# Patient Record
Sex: MALE | Race: WHITE | NOT HISPANIC OR LATINO
[De-identification: names, ages, dates, MRNs, and addresses within clinical notes are randomized per-mention and may not be internally consistent; named-entity substitution may affect disease eponyms.]

---

## 2017-01-17 ENCOUNTER — APPOINTMENT (OUTPATIENT)
Dept: INTERNAL MEDICINE | Facility: CLINIC | Age: 67
End: 2017-01-17

## 2017-01-17 VITALS
BODY MASS INDEX: 30.71 KG/M2 | SYSTOLIC BLOOD PRESSURE: 110 MMHG | DIASTOLIC BLOOD PRESSURE: 70 MMHG | WEIGHT: 202 LBS | TEMPERATURE: 97.4 F

## 2017-01-26 ENCOUNTER — MESSAGE (OUTPATIENT)
Age: 67
End: 2017-01-26

## 2017-01-26 ENCOUNTER — MEDICATION RENEWAL (OUTPATIENT)
Age: 67
End: 2017-01-26

## 2017-02-05 LAB — HBA1C MFR BLD HPLC: 6.1 %

## 2017-03-08 ENCOUNTER — RX RENEWAL (OUTPATIENT)
Age: 67
End: 2017-03-08

## 2017-03-09 ENCOUNTER — RX RENEWAL (OUTPATIENT)
Age: 67
End: 2017-03-09

## 2017-04-03 ENCOUNTER — OUTPATIENT (OUTPATIENT)
Dept: OUTPATIENT SERVICES | Facility: HOSPITAL | Age: 67
LOS: 1 days | Discharge: HOME | End: 2017-04-03

## 2017-04-03 ENCOUNTER — RESULT REVIEW (OUTPATIENT)
Age: 67
End: 2017-04-03

## 2017-04-03 ENCOUNTER — MEDICATION RENEWAL (OUTPATIENT)
Age: 67
End: 2017-04-03

## 2017-04-03 DIAGNOSIS — D47.3 ESSENTIAL (HEMORRHAGIC) THROMBOCYTHEMIA: ICD-10-CM

## 2017-04-04 ENCOUNTER — APPOINTMENT (OUTPATIENT)
Dept: HEMATOLOGY ONCOLOGY | Facility: CLINIC | Age: 67
End: 2017-04-04

## 2017-04-04 VITALS
BODY MASS INDEX: 31.02 KG/M2 | SYSTOLIC BLOOD PRESSURE: 110 MMHG | DIASTOLIC BLOOD PRESSURE: 68 MMHG | TEMPERATURE: 97 F | RESPIRATION RATE: 16 BRPM | WEIGHT: 203.99 LBS | HEART RATE: 68 BPM

## 2017-04-04 LAB
BASOPHILS # BLD AUTO: 0.1 K/UL — SIGNIFICANT CHANGE UP (ref 0–0.2)
BASOPHILS NFR BLD AUTO: 0.9 % — SIGNIFICANT CHANGE UP (ref 0–2)
EOSINOPHIL # BLD AUTO: 0.1 K/UL — SIGNIFICANT CHANGE UP (ref 0–0.5)
EOSINOPHIL NFR BLD AUTO: 2 % — SIGNIFICANT CHANGE UP (ref 0–6)
HCT VFR BLD CALC: 38.2 % — LOW (ref 39–50)
HGB BLD-MCNC: 13 G/DL — SIGNIFICANT CHANGE UP (ref 13–17)
LYMPHOCYTES # BLD AUTO: 1.1 K/UL — SIGNIFICANT CHANGE UP (ref 1–3.3)
LYMPHOCYTES # BLD AUTO: 17.7 % — SIGNIFICANT CHANGE UP (ref 13–44)
MCHC RBC-ENTMCNC: 31.6 PG — SIGNIFICANT CHANGE UP (ref 27–34)
MCHC RBC-ENTMCNC: 34.1 G/DL — SIGNIFICANT CHANGE UP (ref 32–36)
MCV RBC AUTO: 92.7 FL — SIGNIFICANT CHANGE UP (ref 80–100)
MONOCYTES # BLD AUTO: 0.7 K/UL — SIGNIFICANT CHANGE UP (ref 0–0.9)
MONOCYTES NFR BLD AUTO: 12.1 % — SIGNIFICANT CHANGE UP (ref 2–14)
NEUTROPHILS # BLD AUTO: 4.2 K/UL — SIGNIFICANT CHANGE UP (ref 1.8–7.4)
NEUTROPHILS NFR BLD AUTO: 67.3 % — SIGNIFICANT CHANGE UP (ref 43–77)
PLATELET # BLD AUTO: 362 K/UL — SIGNIFICANT CHANGE UP (ref 150–400)
RBC # BLD: 4.12 M/UL — LOW (ref 4.2–5.8)
RBC # FLD: 14 % — SIGNIFICANT CHANGE UP (ref 10.3–14.5)
WBC # BLD: 6.2 K/UL — SIGNIFICANT CHANGE UP (ref 3.8–10.5)
WBC # FLD AUTO: 6.2 K/UL — SIGNIFICANT CHANGE UP (ref 3.8–10.5)

## 2017-04-04 RX ORDER — FAMOTIDINE 40 MG/1
40 TABLET, FILM COATED ORAL
Qty: 90 | Refills: 3 | Status: DISCONTINUED | COMMUNITY
Start: 2017-01-17 | End: 2017-04-04

## 2017-05-16 ENCOUNTER — LABORATORY RESULT (OUTPATIENT)
Age: 67
End: 2017-05-16

## 2017-05-16 ENCOUNTER — APPOINTMENT (OUTPATIENT)
Dept: INTERNAL MEDICINE | Facility: CLINIC | Age: 67
End: 2017-05-16

## 2017-05-16 VITALS
WEIGHT: 202 LBS | DIASTOLIC BLOOD PRESSURE: 80 MMHG | SYSTOLIC BLOOD PRESSURE: 120 MMHG | BODY MASS INDEX: 30.71 KG/M2 | TEMPERATURE: 97.4 F

## 2017-05-16 VITALS — SYSTOLIC BLOOD PRESSURE: 112 MMHG | DIASTOLIC BLOOD PRESSURE: 74 MMHG

## 2017-05-16 DIAGNOSIS — Z11.59 ENCOUNTER FOR SCREENING FOR OTHER VIRAL DISEASES: ICD-10-CM

## 2017-05-16 RX ORDER — OMEPRAZOLE 20 MG
20 TABLET, DELAYED RELEASE (ENTERIC COATED) ORAL
Refills: 0 | Status: DISCONTINUED | COMMUNITY
Start: 2017-04-04 | End: 2017-05-16

## 2017-05-18 LAB
ALBUMIN SERPL ELPH-MCNC: 4.7 G/DL
ALP BLD-CCNC: 92 U/L
ALT SERPL-CCNC: 27 U/L
ANION GAP SERPL CALC-SCNC: 15 MMOL/L
AST SERPL-CCNC: 36 U/L
BASOPHILS # BLD AUTO: 0.02 K/UL
BASOPHILS NFR BLD AUTO: 0.4 %
BILIRUB SERPL-MCNC: 1.2 MG/DL
BUN SERPL-MCNC: 20 MG/DL
CALCIUM SERPL-MCNC: 9.9 MG/DL
CHLORIDE SERPL-SCNC: 103 MMOL/L
CHOLEST SERPL-MCNC: 79 MG/DL
CHOLEST/HDLC SERPL: 3.2 RATIO
CO2 SERPL-SCNC: 26 MMOL/L
CREAT SERPL-MCNC: 0.84 MG/DL
EOSINOPHIL # BLD AUTO: 0.05 K/UL
EOSINOPHIL NFR BLD AUTO: 0.9 %
GLUCOSE SERPL-MCNC: 107 MG/DL
HBA1C MFR BLD HPLC: 5.8 %
HCT VFR BLD CALC: 38.6 %
HCV AB SER QL: NONREACTIVE
HCV S/CO RATIO: 0.15 S/CO
HDLC SERPL-MCNC: 25 MG/DL
HGB BLD-MCNC: 12.3 G/DL
IMM GRANULOCYTES NFR BLD AUTO: 1.8 %
LDLC SERPL CALC-MCNC: 6 MG/DL
LDLC SERPL DIRECT ASSAY-MCNC: 27 MG/DL
LYMPHOCYTES # BLD AUTO: 1.06 K/UL
LYMPHOCYTES NFR BLD AUTO: 18.9 %
MAN DIFF?: NORMAL
MCHC RBC-ENTMCNC: 30.2 PG
MCHC RBC-ENTMCNC: 31.9 GM/DL
MCV RBC AUTO: 94.8 FL
MONOCYTES # BLD AUTO: 0.58 K/UL
MONOCYTES NFR BLD AUTO: 10.3 %
NEUTROPHILS # BLD AUTO: 3.8 K/UL
NEUTROPHILS NFR BLD AUTO: 67.7 %
PLATELET # BLD AUTO: 360 K/UL
POTASSIUM SERPL-SCNC: 4.2 MMOL/L
PROT SERPL-MCNC: 6.9 G/DL
RBC # BLD: 4.07 M/UL
RBC # FLD: 15.4 %
SODIUM SERPL-SCNC: 144 MMOL/L
TRIGL SERPL-MCNC: 240 MG/DL
WBC # FLD AUTO: 5.61 K/UL

## 2017-06-02 ENCOUNTER — RX RENEWAL (OUTPATIENT)
Age: 67
End: 2017-06-02

## 2017-08-09 ENCOUNTER — MEDICATION RENEWAL (OUTPATIENT)
Age: 67
End: 2017-08-09

## 2017-08-30 ENCOUNTER — OUTPATIENT (OUTPATIENT)
Dept: OUTPATIENT SERVICES | Facility: HOSPITAL | Age: 67
LOS: 1 days | Discharge: HOME | End: 2017-08-30

## 2017-08-30 DIAGNOSIS — D47.3 ESSENTIAL (HEMORRHAGIC) THROMBOCYTHEMIA: ICD-10-CM

## 2017-09-06 ENCOUNTER — RESULT REVIEW (OUTPATIENT)
Age: 67
End: 2017-09-06

## 2017-09-06 ENCOUNTER — APPOINTMENT (OUTPATIENT)
Dept: HEMATOLOGY ONCOLOGY | Facility: CLINIC | Age: 67
End: 2017-09-06
Payer: COMMERCIAL

## 2017-09-06 VITALS
BODY MASS INDEX: 30.68 KG/M2 | WEIGHT: 201.8 LBS | SYSTOLIC BLOOD PRESSURE: 122 MMHG | TEMPERATURE: 98.6 F | OXYGEN SATURATION: 97 % | RESPIRATION RATE: 16 BRPM | DIASTOLIC BLOOD PRESSURE: 72 MMHG | HEART RATE: 69 BPM

## 2017-09-06 LAB
BASOPHILS # BLD AUTO: 0 K/UL — SIGNIFICANT CHANGE UP (ref 0–0.2)
BASOPHILS NFR BLD AUTO: 0.5 % — SIGNIFICANT CHANGE UP (ref 0–2)
EOSINOPHIL # BLD AUTO: 0 K/UL — SIGNIFICANT CHANGE UP (ref 0–0.5)
EOSINOPHIL NFR BLD AUTO: 0 % — SIGNIFICANT CHANGE UP (ref 0–6)
HCT VFR BLD CALC: 39.6 % — SIGNIFICANT CHANGE UP (ref 39–50)
HGB BLD-MCNC: 14.1 G/DL — SIGNIFICANT CHANGE UP (ref 13–17)
LYMPHOCYTES # BLD AUTO: 1.2 K/UL — SIGNIFICANT CHANGE UP (ref 1–3.3)
LYMPHOCYTES # BLD AUTO: 21 % — SIGNIFICANT CHANGE UP (ref 13–44)
MCHC RBC-ENTMCNC: 32.4 PG — SIGNIFICANT CHANGE UP (ref 27–34)
MCHC RBC-ENTMCNC: 35.5 G/DL — SIGNIFICANT CHANGE UP (ref 32–36)
MCV RBC AUTO: 91.4 FL — SIGNIFICANT CHANGE UP (ref 80–100)
MONOCYTES # BLD AUTO: 0.6 K/UL — SIGNIFICANT CHANGE UP (ref 0–0.9)
MONOCYTES NFR BLD AUTO: 10.9 % — SIGNIFICANT CHANGE UP (ref 2–14)
NEUTROPHILS # BLD AUTO: 4 K/UL — SIGNIFICANT CHANGE UP (ref 1.8–7.4)
NEUTROPHILS NFR BLD AUTO: 67.6 % — SIGNIFICANT CHANGE UP (ref 43–77)
PLATELET # BLD AUTO: 359 K/UL — SIGNIFICANT CHANGE UP (ref 150–400)
RBC # BLD: 4.34 M/UL — SIGNIFICANT CHANGE UP (ref 4.2–5.8)
RBC # FLD: 14.2 % — SIGNIFICANT CHANGE UP (ref 10.3–14.5)
WBC # BLD: 5.8 K/UL — SIGNIFICANT CHANGE UP (ref 3.8–10.5)
WBC # FLD AUTO: 5.8 K/UL — SIGNIFICANT CHANGE UP (ref 3.8–10.5)

## 2017-09-06 PROCEDURE — 99214 OFFICE O/P EST MOD 30 MIN: CPT

## 2017-09-18 ENCOUNTER — APPOINTMENT (OUTPATIENT)
Dept: INTERNAL MEDICINE | Facility: CLINIC | Age: 67
End: 2017-09-18
Payer: COMMERCIAL

## 2017-09-18 VITALS
SYSTOLIC BLOOD PRESSURE: 124 MMHG | DIASTOLIC BLOOD PRESSURE: 76 MMHG | BODY MASS INDEX: 31.02 KG/M2 | WEIGHT: 204 LBS | HEART RATE: 67 BPM

## 2017-09-18 PROCEDURE — G0008: CPT

## 2017-09-18 PROCEDURE — 93306 TTE W/DOPPLER COMPLETE: CPT

## 2017-09-18 PROCEDURE — 36415 COLL VENOUS BLD VENIPUNCTURE: CPT

## 2017-09-18 PROCEDURE — 90686 IIV4 VACC NO PRSV 0.5 ML IM: CPT

## 2017-09-18 PROCEDURE — 99214 OFFICE O/P EST MOD 30 MIN: CPT | Mod: 25

## 2017-09-18 PROCEDURE — 93000 ELECTROCARDIOGRAM COMPLETE: CPT

## 2017-09-19 ENCOUNTER — TRANSCRIPTION ENCOUNTER (OUTPATIENT)
Age: 67
End: 2017-09-19

## 2017-09-19 ENCOUNTER — LABORATORY RESULT (OUTPATIENT)
Age: 67
End: 2017-09-19

## 2017-09-20 ENCOUNTER — CLINICAL ADVICE (OUTPATIENT)
Age: 67
End: 2017-09-20

## 2017-10-15 LAB
ALBUMIN SERPL ELPH-MCNC: 4.8 G/DL
ALP BLD-CCNC: 87 U/L
ALT SERPL-CCNC: 32 U/L
ANION GAP SERPL CALC-SCNC: 21 MMOL/L
AST SERPL-CCNC: 44 U/L
BILIRUB SERPL-MCNC: 1.2 MG/DL
BUN SERPL-MCNC: 18 MG/DL
CALCIUM SERPL-MCNC: 9.4 MG/DL
CHLORIDE SERPL-SCNC: 101 MMOL/L
CHOLEST SERPL-MCNC: 96 MG/DL
CHOLEST/HDLC SERPL: 3 RATIO
CO2 SERPL-SCNC: 20 MMOL/L
CREAT SERPL-MCNC: 0.87 MG/DL
GLUCOSE SERPL-MCNC: 87 MG/DL
HBA1C MFR BLD HPLC: 5.8 %
HDLC SERPL-MCNC: 32 MG/DL
LDLC SERPL CALC-MCNC: 32 MG/DL
LDLC SERPL DIRECT ASSAY-MCNC: 43 MG/DL
POTASSIUM SERPL-SCNC: 4.7 MMOL/L
PROT SERPL-MCNC: 7.2 G/DL
SODIUM SERPL-SCNC: 142 MMOL/L
TRIGL SERPL-MCNC: 158 MG/DL

## 2017-11-28 ENCOUNTER — MEDICATION RENEWAL (OUTPATIENT)
Age: 67
End: 2017-11-28

## 2017-11-30 ENCOUNTER — TRANSCRIPTION ENCOUNTER (OUTPATIENT)
Age: 67
End: 2017-11-30

## 2017-12-01 ENCOUNTER — TRANSCRIPTION ENCOUNTER (OUTPATIENT)
Age: 67
End: 2017-12-01

## 2017-12-05 ENCOUNTER — TRANSCRIPTION ENCOUNTER (OUTPATIENT)
Age: 67
End: 2017-12-05

## 2017-12-26 ENCOUNTER — OUTPATIENT (OUTPATIENT)
Dept: OUTPATIENT SERVICES | Facility: HOSPITAL | Age: 67
LOS: 1 days | Discharge: HOME | End: 2017-12-26

## 2017-12-26 DIAGNOSIS — D47.3 ESSENTIAL (HEMORRHAGIC) THROMBOCYTHEMIA: ICD-10-CM

## 2017-12-28 ENCOUNTER — LABORATORY RESULT (OUTPATIENT)
Age: 67
End: 2017-12-28

## 2018-01-04 LAB
ALBUMIN SERPL ELPH-MCNC: 4.7 G/DL
ALP BLD-CCNC: 72 U/L
ALT SERPL-CCNC: 30 U/L
ANION GAP SERPL CALC-SCNC: 16 MMOL/L
AST SERPL-CCNC: 47 U/L
BASOPHILS # BLD AUTO: 0.02 K/UL
BASOPHILS NFR BLD AUTO: 0.3 %
BILIRUB SERPL-MCNC: 1.1 MG/DL
BUN SERPL-MCNC: 22 MG/DL
CALCIUM SERPL-MCNC: 9.5 MG/DL
CHLORIDE SERPL-SCNC: 100 MMOL/L
CHOLEST SERPL-MCNC: 96 MG/DL
CHOLEST/HDLC SERPL: 3 RATIO
CO2 SERPL-SCNC: 26 MMOL/L
CREAT SERPL-MCNC: 1.02 MG/DL
EOSINOPHIL # BLD AUTO: 0.03 K/UL
EOSINOPHIL NFR BLD AUTO: 0.5 %
GLUCOSE SERPL-MCNC: 101 MG/DL
HBA1C MFR BLD HPLC: 5.6 %
HCT VFR BLD CALC: 40.3 %
HDLC SERPL-MCNC: 32 MG/DL
HGB BLD-MCNC: 12.9 G/DL
IMM GRANULOCYTES NFR BLD AUTO: 3.6 %
LDLC SERPL CALC-MCNC: 34 MG/DL
LYMPHOCYTES # BLD AUTO: 1.01 K/UL
LYMPHOCYTES NFR BLD AUTO: 15.3 %
MAN DIFF?: NORMAL
MCHC RBC-ENTMCNC: 30.6 PG
MCHC RBC-ENTMCNC: 32 GM/DL
MCV RBC AUTO: 95.7 FL
MONOCYTES # BLD AUTO: 1.01 K/UL
MONOCYTES NFR BLD AUTO: 15.3 %
NEUTROPHILS # BLD AUTO: 4.31 K/UL
NEUTROPHILS NFR BLD AUTO: 65 %
PLATELET # BLD AUTO: 370 K/UL
POTASSIUM SERPL-SCNC: 4.2 MMOL/L
PROT SERPL-MCNC: 7.3 G/DL
RBC # BLD: 4.21 M/UL
RBC # FLD: 16 %
SODIUM SERPL-SCNC: 142 MMOL/L
TRIGL SERPL-MCNC: 149 MG/DL
WBC # FLD AUTO: 6.62 K/UL

## 2018-01-05 ENCOUNTER — RESULT REVIEW (OUTPATIENT)
Age: 68
End: 2018-01-05

## 2018-01-05 ENCOUNTER — APPOINTMENT (OUTPATIENT)
Dept: HEMATOLOGY ONCOLOGY | Facility: CLINIC | Age: 68
End: 2018-01-05
Payer: COMMERCIAL

## 2018-01-05 VITALS
SYSTOLIC BLOOD PRESSURE: 127 MMHG | DIASTOLIC BLOOD PRESSURE: 83 MMHG | TEMPERATURE: 97.7 F | WEIGHT: 203 LBS | BODY MASS INDEX: 30.87 KG/M2 | RESPIRATION RATE: 16 BRPM | HEART RATE: 85 BPM | OXYGEN SATURATION: 95 %

## 2018-01-05 LAB
BASOPHILS # BLD AUTO: 0.1 K/UL — SIGNIFICANT CHANGE UP (ref 0–0.2)
BASOPHILS NFR BLD AUTO: 1.2 % — SIGNIFICANT CHANGE UP (ref 0–2)
EOSINOPHIL # BLD AUTO: 0.1 K/UL — SIGNIFICANT CHANGE UP (ref 0–0.5)
EOSINOPHIL NFR BLD AUTO: 1.3 % — SIGNIFICANT CHANGE UP (ref 0–6)
HCT VFR BLD CALC: 38.2 % — LOW (ref 39–50)
HGB BLD-MCNC: 13.3 G/DL — SIGNIFICANT CHANGE UP (ref 13–17)
LYMPHOCYTES # BLD AUTO: 1 K/UL — SIGNIFICANT CHANGE UP (ref 1–3.3)
LYMPHOCYTES # BLD AUTO: 14.4 % — SIGNIFICANT CHANGE UP (ref 13–44)
MCHC RBC-ENTMCNC: 32 PG — SIGNIFICANT CHANGE UP (ref 27–34)
MCHC RBC-ENTMCNC: 34.8 G/DL — SIGNIFICANT CHANGE UP (ref 32–36)
MCV RBC AUTO: 91.9 FL — SIGNIFICANT CHANGE UP (ref 80–100)
MONOCYTES # BLD AUTO: 0.9 K/UL — SIGNIFICANT CHANGE UP (ref 0–0.9)
MONOCYTES NFR BLD AUTO: 13.2 % — SIGNIFICANT CHANGE UP (ref 2–14)
NEUTROPHILS # BLD AUTO: 4.7 K/UL — SIGNIFICANT CHANGE UP (ref 1.8–7.4)
NEUTROPHILS NFR BLD AUTO: 69.9 % — SIGNIFICANT CHANGE UP (ref 43–77)
PLATELET # BLD AUTO: 350 K/UL — SIGNIFICANT CHANGE UP (ref 150–400)
RBC # BLD: 4.16 M/UL — LOW (ref 4.2–5.8)
RBC # FLD: 14.3 % — SIGNIFICANT CHANGE UP (ref 10.3–14.5)
WBC # BLD: 6.7 K/UL — SIGNIFICANT CHANGE UP (ref 3.8–10.5)
WBC # FLD AUTO: 6.7 K/UL — SIGNIFICANT CHANGE UP (ref 3.8–10.5)

## 2018-01-05 PROCEDURE — 99214 OFFICE O/P EST MOD 30 MIN: CPT

## 2018-01-08 ENCOUNTER — APPOINTMENT (OUTPATIENT)
Dept: CARDIOLOGY | Facility: CLINIC | Age: 68
End: 2018-01-08
Payer: COMMERCIAL

## 2018-01-08 ENCOUNTER — NON-APPOINTMENT (OUTPATIENT)
Age: 68
End: 2018-01-08

## 2018-01-08 VITALS
SYSTOLIC BLOOD PRESSURE: 120 MMHG | BODY MASS INDEX: 30.87 KG/M2 | HEART RATE: 79 BPM | WEIGHT: 203 LBS | DIASTOLIC BLOOD PRESSURE: 78 MMHG | OXYGEN SATURATION: 98 %

## 2018-01-08 PROCEDURE — 99214 OFFICE O/P EST MOD 30 MIN: CPT | Mod: 25

## 2018-01-08 PROCEDURE — 99397 PER PM REEVAL EST PAT 65+ YR: CPT

## 2018-01-08 PROCEDURE — 93000 ELECTROCARDIOGRAM COMPLETE: CPT

## 2018-01-09 ENCOUNTER — NON-APPOINTMENT (OUTPATIENT)
Age: 68
End: 2018-01-09

## 2018-01-22 ENCOUNTER — RESULT CHARGE (OUTPATIENT)
Age: 68
End: 2018-01-22

## 2018-03-05 ENCOUNTER — APPOINTMENT (OUTPATIENT)
Dept: CARDIOLOGY | Facility: CLINIC | Age: 68
End: 2018-03-05
Payer: COMMERCIAL

## 2018-03-05 VITALS
DIASTOLIC BLOOD PRESSURE: 74 MMHG | OXYGEN SATURATION: 97 % | BODY MASS INDEX: 31.02 KG/M2 | HEART RATE: 66 BPM | WEIGHT: 204 LBS | SYSTOLIC BLOOD PRESSURE: 120 MMHG

## 2018-03-05 VITALS — SYSTOLIC BLOOD PRESSURE: 112 MMHG | DIASTOLIC BLOOD PRESSURE: 76 MMHG

## 2018-03-05 PROCEDURE — 99214 OFFICE O/P EST MOD 30 MIN: CPT

## 2018-03-30 ENCOUNTER — OUTPATIENT (OUTPATIENT)
Dept: OUTPATIENT SERVICES | Facility: HOSPITAL | Age: 68
LOS: 1 days | Discharge: HOME | End: 2018-03-30

## 2018-03-30 DIAGNOSIS — D47.3 ESSENTIAL (HEMORRHAGIC) THROMBOCYTHEMIA: ICD-10-CM

## 2018-04-03 ENCOUNTER — APPOINTMENT (OUTPATIENT)
Dept: HEMATOLOGY ONCOLOGY | Facility: CLINIC | Age: 68
End: 2018-04-03
Payer: COMMERCIAL

## 2018-04-03 ENCOUNTER — RESULT REVIEW (OUTPATIENT)
Age: 68
End: 2018-04-03

## 2018-04-03 VITALS
OXYGEN SATURATION: 95 % | SYSTOLIC BLOOD PRESSURE: 120 MMHG | BODY MASS INDEX: 30.97 KG/M2 | WEIGHT: 203.69 LBS | HEART RATE: 66 BPM | TEMPERATURE: 97.4 F | RESPIRATION RATE: 16 BRPM | DIASTOLIC BLOOD PRESSURE: 80 MMHG

## 2018-04-03 LAB
BASOPHILS # BLD AUTO: 0 K/UL — SIGNIFICANT CHANGE UP (ref 0–0.2)
BASOPHILS NFR BLD AUTO: 0.8 % — SIGNIFICANT CHANGE UP (ref 0–2)
EOSINOPHIL # BLD AUTO: 0 K/UL — SIGNIFICANT CHANGE UP (ref 0–0.5)
EOSINOPHIL NFR BLD AUTO: 0.8 % — SIGNIFICANT CHANGE UP (ref 0–6)
HCT VFR BLD CALC: 37.3 % — LOW (ref 39–50)
HGB BLD-MCNC: 12.6 G/DL — LOW (ref 13–17)
LYMPHOCYTES # BLD AUTO: 1 K/UL — SIGNIFICANT CHANGE UP (ref 1–3.3)
LYMPHOCYTES # BLD AUTO: 17.6 % — SIGNIFICANT CHANGE UP (ref 13–44)
MCHC RBC-ENTMCNC: 30.9 PG — SIGNIFICANT CHANGE UP (ref 27–34)
MCHC RBC-ENTMCNC: 33.8 G/DL — SIGNIFICANT CHANGE UP (ref 32–36)
MCV RBC AUTO: 91.6 FL — SIGNIFICANT CHANGE UP (ref 80–100)
MONOCYTES # BLD AUTO: 0.7 K/UL — SIGNIFICANT CHANGE UP (ref 0–0.9)
MONOCYTES NFR BLD AUTO: 12 % — SIGNIFICANT CHANGE UP (ref 2–14)
NEUTROPHILS # BLD AUTO: 4 K/UL — SIGNIFICANT CHANGE UP (ref 1.8–7.4)
NEUTROPHILS NFR BLD AUTO: 68.9 % — SIGNIFICANT CHANGE UP (ref 43–77)
PLATELET # BLD AUTO: 305 K/UL — SIGNIFICANT CHANGE UP (ref 150–400)
RBC # BLD: 4.08 M/UL — LOW (ref 4.2–5.8)
RBC # FLD: 14.2 % — SIGNIFICANT CHANGE UP (ref 10.3–14.5)
WBC # BLD: 5.8 K/UL — SIGNIFICANT CHANGE UP (ref 3.8–10.5)
WBC # FLD AUTO: 5.8 K/UL — SIGNIFICANT CHANGE UP (ref 3.8–10.5)

## 2018-04-03 PROCEDURE — 99214 OFFICE O/P EST MOD 30 MIN: CPT

## 2018-05-17 ENCOUNTER — MEDICATION RENEWAL (OUTPATIENT)
Age: 68
End: 2018-05-17

## 2018-06-12 ENCOUNTER — MEDICATION RENEWAL (OUTPATIENT)
Age: 68
End: 2018-06-12

## 2018-06-25 ENCOUNTER — LABORATORY RESULT (OUTPATIENT)
Age: 68
End: 2018-06-25

## 2018-06-25 ENCOUNTER — APPOINTMENT (OUTPATIENT)
Dept: CARDIOLOGY | Facility: CLINIC | Age: 68
End: 2018-06-25
Payer: MEDICARE

## 2018-06-25 ENCOUNTER — NON-APPOINTMENT (OUTPATIENT)
Age: 68
End: 2018-06-25

## 2018-06-25 VITALS — OXYGEN SATURATION: 96 % | HEART RATE: 77 BPM | SYSTOLIC BLOOD PRESSURE: 120 MMHG | DIASTOLIC BLOOD PRESSURE: 80 MMHG

## 2018-06-25 VITALS — DIASTOLIC BLOOD PRESSURE: 66 MMHG | SYSTOLIC BLOOD PRESSURE: 114 MMHG

## 2018-06-25 VITALS — WEIGHT: 191 LBS | BODY MASS INDEX: 29.04 KG/M2

## 2018-06-25 PROCEDURE — 36415 COLL VENOUS BLD VENIPUNCTURE: CPT

## 2018-06-25 PROCEDURE — 99214 OFFICE O/P EST MOD 30 MIN: CPT

## 2018-06-25 PROCEDURE — 93000 ELECTROCARDIOGRAM COMPLETE: CPT

## 2018-06-27 ENCOUNTER — NON-APPOINTMENT (OUTPATIENT)
Age: 68
End: 2018-06-27

## 2018-06-29 LAB
ALBUMIN SERPL ELPH-MCNC: 4.6 G/DL
ALP BLD-CCNC: 87 U/L
ALT SERPL-CCNC: 29 U/L
ANION GAP SERPL CALC-SCNC: 16 MMOL/L
AST SERPL-CCNC: 36 U/L
BASOPHILS # BLD AUTO: 0.06 K/UL
BASOPHILS NFR BLD AUTO: 1 %
BILIRUB SERPL-MCNC: 1.5 MG/DL
BUN SERPL-MCNC: 24 MG/DL
CALCIUM SERPL-MCNC: 9.5 MG/DL
CHLORIDE SERPL-SCNC: 102 MMOL/L
CHOLEST SERPL-MCNC: 92 MG/DL
CHOLEST/HDLC SERPL: 3 RATIO
CO2 SERPL-SCNC: 24 MMOL/L
CREAT SERPL-MCNC: 0.93 MG/DL
EOSINOPHIL # BLD AUTO: 0 K/UL
EOSINOPHIL NFR BLD AUTO: 0 %
GLUCOSE SERPL-MCNC: 115 MG/DL
HBA1C MFR BLD HPLC: 5.6 %
HCT VFR BLD CALC: 39.1 %
HDLC SERPL-MCNC: 31 MG/DL
HGB BLD-MCNC: 12.6 G/DL
LDLC SERPL CALC-MCNC: 38 MG/DL
LDLC SERPL DIRECT ASSAY-MCNC: 46 MG/DL
LYMPHOCYTES # BLD AUTO: 1.13 K/UL
LYMPHOCYTES NFR BLD AUTO: 18 %
MAN DIFF?: NORMAL
MCHC RBC-ENTMCNC: 30 PG
MCHC RBC-ENTMCNC: 32.2 GM/DL
MCV RBC AUTO: 93.1 FL
MONOCYTES # BLD AUTO: 0.88 K/UL
MONOCYTES NFR BLD AUTO: 14 %
NEUTROPHILS # BLD AUTO: 4.19 K/UL
NEUTROPHILS NFR BLD AUTO: 65 %
PLATELET # BLD AUTO: 350 K/UL
POTASSIUM SERPL-SCNC: 4.4 MMOL/L
PROT SERPL-MCNC: 7.1 G/DL
RBC # BLD: 4.2 M/UL
RBC # FLD: 15.6 %
SODIUM SERPL-SCNC: 142 MMOL/L
TRIGL SERPL-MCNC: 116 MG/DL
WBC # FLD AUTO: 6.26 K/UL

## 2018-07-27 ENCOUNTER — OUTPATIENT (OUTPATIENT)
Dept: OUTPATIENT SERVICES | Facility: HOSPITAL | Age: 68
LOS: 1 days | Discharge: HOME | End: 2018-07-27

## 2018-07-27 DIAGNOSIS — D47.3 ESSENTIAL (HEMORRHAGIC) THROMBOCYTHEMIA: ICD-10-CM

## 2018-08-07 ENCOUNTER — APPOINTMENT (OUTPATIENT)
Dept: HEMATOLOGY ONCOLOGY | Facility: CLINIC | Age: 68
End: 2018-08-07
Payer: MEDICARE

## 2018-08-07 ENCOUNTER — RESULT REVIEW (OUTPATIENT)
Age: 68
End: 2018-08-07

## 2018-08-07 VITALS
RESPIRATION RATE: 17 BRPM | WEIGHT: 197.6 LBS | HEART RATE: 65 BPM | OXYGEN SATURATION: 95 % | BODY MASS INDEX: 30.05 KG/M2 | SYSTOLIC BLOOD PRESSURE: 116 MMHG | DIASTOLIC BLOOD PRESSURE: 76 MMHG | TEMPERATURE: 97.6 F

## 2018-08-07 LAB
BASOPHILS # BLD AUTO: 0.1 K/UL — SIGNIFICANT CHANGE UP (ref 0–0.2)
BASOPHILS NFR BLD AUTO: 1.1 % — SIGNIFICANT CHANGE UP (ref 0–2)
EOSINOPHIL # BLD AUTO: 0.1 K/UL — SIGNIFICANT CHANGE UP (ref 0–0.5)
EOSINOPHIL NFR BLD AUTO: 1.4 % — SIGNIFICANT CHANGE UP (ref 0–6)
HCT VFR BLD CALC: 41.2 % — SIGNIFICANT CHANGE UP (ref 39–50)
HGB BLD-MCNC: 14.2 G/DL — SIGNIFICANT CHANGE UP (ref 13–17)
LYMPHOCYTES # BLD AUTO: 1.2 K/UL — SIGNIFICANT CHANGE UP (ref 1–3.3)
LYMPHOCYTES # BLD AUTO: 17.7 % — SIGNIFICANT CHANGE UP (ref 13–44)
MCHC RBC-ENTMCNC: 30.9 PG — SIGNIFICANT CHANGE UP (ref 27–34)
MCHC RBC-ENTMCNC: 34.5 G/DL — SIGNIFICANT CHANGE UP (ref 32–36)
MCV RBC AUTO: 89.5 FL — SIGNIFICANT CHANGE UP (ref 80–100)
MONOCYTES # BLD AUTO: 0.9 K/UL — SIGNIFICANT CHANGE UP (ref 0–0.9)
MONOCYTES NFR BLD AUTO: 13.3 % — SIGNIFICANT CHANGE UP (ref 2–14)
NEUTROPHILS # BLD AUTO: 4.6 K/UL — SIGNIFICANT CHANGE UP (ref 1.8–7.4)
NEUTROPHILS NFR BLD AUTO: 66.4 % — SIGNIFICANT CHANGE UP (ref 43–77)
PLATELET # BLD AUTO: 333 K/UL — SIGNIFICANT CHANGE UP (ref 150–400)
RBC # BLD: 4.61 M/UL — SIGNIFICANT CHANGE UP (ref 4.2–5.8)
RBC # FLD: 14.3 % — SIGNIFICANT CHANGE UP (ref 10.3–14.5)
WBC # BLD: 6.9 K/UL — SIGNIFICANT CHANGE UP (ref 3.8–10.5)
WBC # FLD AUTO: 6.9 K/UL — SIGNIFICANT CHANGE UP (ref 3.8–10.5)

## 2018-08-07 PROCEDURE — 99214 OFFICE O/P EST MOD 30 MIN: CPT

## 2018-09-12 ENCOUNTER — MEDICATION RENEWAL (OUTPATIENT)
Age: 68
End: 2018-09-12

## 2018-10-08 ENCOUNTER — MEDICATION RENEWAL (OUTPATIENT)
Age: 68
End: 2018-10-08

## 2018-10-15 ENCOUNTER — LABORATORY RESULT (OUTPATIENT)
Age: 68
End: 2018-10-15

## 2018-10-15 ENCOUNTER — APPOINTMENT (OUTPATIENT)
Dept: CARDIOLOGY | Facility: CLINIC | Age: 68
End: 2018-10-15
Payer: MEDICARE

## 2018-10-15 ENCOUNTER — NON-APPOINTMENT (OUTPATIENT)
Age: 68
End: 2018-10-15

## 2018-10-15 VITALS — DIASTOLIC BLOOD PRESSURE: 72 MMHG | SYSTOLIC BLOOD PRESSURE: 124 MMHG

## 2018-10-15 VITALS
HEART RATE: 79 BPM | WEIGHT: 204 LBS | DIASTOLIC BLOOD PRESSURE: 70 MMHG | OXYGEN SATURATION: 96 % | BODY MASS INDEX: 31.02 KG/M2 | SYSTOLIC BLOOD PRESSURE: 130 MMHG

## 2018-10-15 PROCEDURE — 93000 ELECTROCARDIOGRAM COMPLETE: CPT

## 2018-10-15 PROCEDURE — 99214 OFFICE O/P EST MOD 30 MIN: CPT

## 2018-10-15 PROCEDURE — G0008: CPT

## 2018-10-15 PROCEDURE — 36415 COLL VENOUS BLD VENIPUNCTURE: CPT

## 2018-10-15 PROCEDURE — 90662 IIV NO PRSV INCREASED AG IM: CPT

## 2018-10-24 ENCOUNTER — MEDICATION RENEWAL (OUTPATIENT)
Age: 68
End: 2018-10-24

## 2018-10-27 LAB
ALBUMIN SERPL ELPH-MCNC: 5 G/DL
ALP BLD-CCNC: 82 U/L
ALT SERPL-CCNC: 23 U/L
ANION GAP SERPL CALC-SCNC: 17 MMOL/L
AST SERPL-CCNC: 43 U/L
BASOPHILS # BLD AUTO: 0.34 K/UL
BASOPHILS NFR BLD AUTO: 5 %
BILIRUB SERPL-MCNC: 1.2 MG/DL
BUN SERPL-MCNC: 17 MG/DL
CALCIUM SERPL-MCNC: 9.4 MG/DL
CHLORIDE SERPL-SCNC: 102 MMOL/L
CHOLEST SERPL-MCNC: 81 MG/DL
CHOLEST/HDLC SERPL: 2.9 RATIO
CO2 SERPL-SCNC: 22 MMOL/L
CREAT SERPL-MCNC: 0.86 MG/DL
EOSINOPHIL # BLD AUTO: 0.07 K/UL
EOSINOPHIL NFR BLD AUTO: 1 %
GLUCOSE SERPL-MCNC: 87 MG/DL
HBA1C MFR BLD HPLC: 5.9 %
HCT VFR BLD CALC: 41 %
HDLC SERPL-MCNC: 28 MG/DL
HGB BLD-MCNC: 13.2 G/DL
LDLC SERPL CALC-MCNC: 35 MG/DL
LYMPHOCYTES # BLD AUTO: 1.03 K/UL
LYMPHOCYTES NFR BLD AUTO: 15 %
MAN DIFF?: NORMAL
MCHC RBC-ENTMCNC: 30.3 PG
MCHC RBC-ENTMCNC: 32.2 GM/DL
MCV RBC AUTO: 94.3 FL
MONOCYTES # BLD AUTO: 1.03 K/UL
MONOCYTES NFR BLD AUTO: 15 %
NEUTROPHILS # BLD AUTO: 4.24 K/UL
NEUTROPHILS NFR BLD AUTO: 62 %
PLATELET # BLD AUTO: 336 K/UL
POTASSIUM SERPL-SCNC: 4.5 MMOL/L
PROT SERPL-MCNC: 7 G/DL
RBC # BLD: 4.35 M/UL
RBC # FLD: 17 %
SODIUM SERPL-SCNC: 141 MMOL/L
TRIGL SERPL-MCNC: 89 MG/DL
WBC # FLD AUTO: 6.84 K/UL

## 2018-12-06 ENCOUNTER — OUTPATIENT (OUTPATIENT)
Dept: OUTPATIENT SERVICES | Facility: HOSPITAL | Age: 68
LOS: 1 days | Discharge: HOME | End: 2018-12-06

## 2018-12-06 DIAGNOSIS — D47.3 ESSENTIAL (HEMORRHAGIC) THROMBOCYTHEMIA: ICD-10-CM

## 2018-12-11 ENCOUNTER — RESULT REVIEW (OUTPATIENT)
Age: 68
End: 2018-12-11

## 2018-12-11 ENCOUNTER — APPOINTMENT (OUTPATIENT)
Dept: HEMATOLOGY ONCOLOGY | Facility: CLINIC | Age: 68
End: 2018-12-11
Payer: MEDICARE

## 2018-12-11 VITALS
OXYGEN SATURATION: 97 % | SYSTOLIC BLOOD PRESSURE: 120 MMHG | TEMPERATURE: 97.4 F | DIASTOLIC BLOOD PRESSURE: 76 MMHG | HEART RATE: 62 BPM | BODY MASS INDEX: 31.01 KG/M2 | WEIGHT: 203.92 LBS | RESPIRATION RATE: 16 BRPM

## 2018-12-11 LAB
BASOPHILS # BLD AUTO: 0.1 K/UL — SIGNIFICANT CHANGE UP (ref 0–0.2)
BASOPHILS NFR BLD AUTO: 0 % — SIGNIFICANT CHANGE UP (ref 0–2)
DACRYOCYTES BLD QL SMEAR: SLIGHT — SIGNIFICANT CHANGE UP
ELLIPTOCYTES BLD QL SMEAR: SLIGHT — SIGNIFICANT CHANGE UP
EOSINOPHIL # BLD AUTO: 0 K/UL — SIGNIFICANT CHANGE UP (ref 0–0.5)
EOSINOPHIL NFR BLD AUTO: 0 % — SIGNIFICANT CHANGE UP (ref 0–6)
HCT VFR BLD CALC: 40 % — SIGNIFICANT CHANGE UP (ref 39–50)
HGB BLD-MCNC: 13.2 G/DL — SIGNIFICANT CHANGE UP (ref 13–17)
LYMPHOCYTES # BLD AUTO: 0 % — SIGNIFICANT CHANGE UP (ref 13–44)
LYMPHOCYTES # BLD AUTO: 1.3 K/UL — SIGNIFICANT CHANGE UP (ref 1–3.3)
MACROCYTES BLD QL: SLIGHT — SIGNIFICANT CHANGE UP
MCHC RBC-ENTMCNC: 29.4 PG — SIGNIFICANT CHANGE UP (ref 27–34)
MCHC RBC-ENTMCNC: 32.9 G/DL — SIGNIFICANT CHANGE UP (ref 32–36)
MCV RBC AUTO: 89.6 FL — SIGNIFICANT CHANGE UP (ref 80–100)
MICROCYTES BLD QL: SLIGHT — SIGNIFICANT CHANGE UP
MONOCYTES # BLD AUTO: 1.6 K/UL — HIGH (ref 0–0.9)
MONOCYTES NFR BLD AUTO: 0 % — HIGH (ref 2–14)
NEUTROPHILS # BLD AUTO: 5.5 K/UL — SIGNIFICANT CHANGE UP (ref 1.8–7.4)
NEUTROPHILS NFR BLD AUTO: 0 % — SIGNIFICANT CHANGE UP (ref 43–77)
PLAT MORPH BLD: NORMAL — SIGNIFICANT CHANGE UP
PLATELET # BLD AUTO: 364 K/UL — SIGNIFICANT CHANGE UP (ref 150–400)
POLYCHROMASIA BLD QL SMEAR: SLIGHT — SIGNIFICANT CHANGE UP
RBC # BLD: 4.47 M/UL — SIGNIFICANT CHANGE UP (ref 4.2–5.8)
RBC # FLD: 14.7 % — HIGH (ref 10.3–14.5)
RBC BLD AUTO: SIGNIFICANT CHANGE UP
WBC # BLD: 8.5 K/UL — SIGNIFICANT CHANGE UP (ref 3.8–10.5)
WBC # FLD AUTO: 8.5 K/UL — SIGNIFICANT CHANGE UP (ref 3.8–10.5)

## 2018-12-11 PROCEDURE — 99214 OFFICE O/P EST MOD 30 MIN: CPT

## 2018-12-11 NOTE — ASSESSMENT
[Palliative Care Plan] : not applicable at this time [FreeTextEntry1] : 69 YO M with mpl+ ET. Doing well. \par \par Plan:\par Observe\par ASA 81 mg daily\par RTC 4 months. \par \par

## 2018-12-11 NOTE — PHYSICAL EXAM
[Fully active, able to carry on all pre-disease performance without restriction] : Status 0 - Fully active, able to carry on all pre-disease performance without restriction [Normal] : affect appropriate [de-identified] : 3 x 3 cm lipoma on posterior left upper extremity

## 2018-12-11 NOTE — HISTORY OF PRESENT ILLNESS
[Disease:__________________________] : Disease: [unfilled] [de-identified] : JAK2, bcr abl, calreticulin negative\par mpl exon 10 + [de-identified] : Feels well. No c/o. Pruritus tolerable and stable. No SOB, chest pain, HA, visual problems, bleeding, bruising, abdominal pain, leg pain/swelling, foot pain.Had flu vaccine.\par \par \par \par

## 2018-12-11 NOTE — ADDENDUM
[FreeTextEntry1] : Documented by Lokesh Power acting as a scribe for Dr. Noam Coker on 12/11/18\par \par All medical record entries made by the Scribe were at my, Dr. Noam Coker's, direction and personally dictated by me on 12/11/18. I have reviewed the chart and agree that the record accurately reflects my personal performance of the history, physical exam, procedure and imaging

## 2018-12-11 NOTE — REASON FOR VISIT
[Follow-Up Visit] : a follow-up visit for [Myeloproliferative Disorder] : myeloproliferative disorder [Thrombocytosis] : thrombocytosis

## 2018-12-11 NOTE — RESULTS/DATA
[FreeTextEntry1] : WBC 8,500, Hgb 13.2, Hct 40.0, Plts 364,000, Diff pending \par \par 10/15/18\par CMP: ALT 43

## 2019-02-26 ENCOUNTER — NON-APPOINTMENT (OUTPATIENT)
Age: 69
End: 2019-02-26

## 2019-02-26 ENCOUNTER — APPOINTMENT (OUTPATIENT)
Dept: CARDIOLOGY | Facility: CLINIC | Age: 69
End: 2019-02-26
Payer: MEDICARE

## 2019-02-26 VITALS — SYSTOLIC BLOOD PRESSURE: 113 MMHG | DIASTOLIC BLOOD PRESSURE: 68 MMHG

## 2019-02-26 VITALS
HEART RATE: 73 BPM | SYSTOLIC BLOOD PRESSURE: 118 MMHG | DIASTOLIC BLOOD PRESSURE: 70 MMHG | BODY MASS INDEX: 30.41 KG/M2 | OXYGEN SATURATION: 96 % | WEIGHT: 200 LBS

## 2019-02-26 PROCEDURE — 93000 ELECTROCARDIOGRAM COMPLETE: CPT

## 2019-02-26 PROCEDURE — 99214 OFFICE O/P EST MOD 30 MIN: CPT

## 2019-02-26 NOTE — HISTORY OF PRESENT ILLNESS
[FreeTextEntry1] : Having "continual gout."\par He denies chest pain, shortness of breath, and palpitations.\par

## 2019-03-02 ENCOUNTER — NON-APPOINTMENT (OUTPATIENT)
Age: 69
End: 2019-03-02

## 2019-04-02 ENCOUNTER — LABORATORY RESULT (OUTPATIENT)
Age: 69
End: 2019-04-02

## 2019-04-02 ENCOUNTER — APPOINTMENT (OUTPATIENT)
Dept: CARDIOLOGY | Facility: CLINIC | Age: 69
End: 2019-04-02
Payer: MEDICARE

## 2019-04-02 ENCOUNTER — TRANSCRIPTION ENCOUNTER (OUTPATIENT)
Age: 69
End: 2019-04-02

## 2019-04-02 VITALS
BODY MASS INDEX: 29.95 KG/M2 | HEART RATE: 62 BPM | OXYGEN SATURATION: 99 % | WEIGHT: 197 LBS | SYSTOLIC BLOOD PRESSURE: 124 MMHG | DIASTOLIC BLOOD PRESSURE: 80 MMHG

## 2019-04-02 VITALS — SYSTOLIC BLOOD PRESSURE: 118 MMHG | DIASTOLIC BLOOD PRESSURE: 74 MMHG

## 2019-04-02 DIAGNOSIS — Z12.5 ENCOUNTER FOR SCREENING FOR MALIGNANT NEOPLASM OF PROSTATE: ICD-10-CM

## 2019-04-02 PROCEDURE — 99214 OFFICE O/P EST MOD 30 MIN: CPT

## 2019-04-02 PROCEDURE — 36415 COLL VENOUS BLD VENIPUNCTURE: CPT

## 2019-04-02 NOTE — HISTORY OF PRESENT ILLNESS
[FreeTextEntry1] : Hydrochlorothiazide was stopped because of frequent gout.\par Subsequently, his gout has significantly improved. He has had no flare-ups, although he feels he is always about to have gout.

## 2019-04-09 ENCOUNTER — OUTPATIENT (OUTPATIENT)
Dept: OUTPATIENT SERVICES | Facility: HOSPITAL | Age: 69
LOS: 1 days | Discharge: HOME | End: 2019-04-09

## 2019-04-09 DIAGNOSIS — D47.3 ESSENTIAL (HEMORRHAGIC) THROMBOCYTHEMIA: ICD-10-CM

## 2019-04-16 ENCOUNTER — APPOINTMENT (OUTPATIENT)
Dept: HEMATOLOGY ONCOLOGY | Facility: CLINIC | Age: 69
End: 2019-04-16
Payer: MEDICARE

## 2019-04-16 ENCOUNTER — RESULT REVIEW (OUTPATIENT)
Age: 69
End: 2019-04-16

## 2019-04-16 VITALS
RESPIRATION RATE: 16 BRPM | DIASTOLIC BLOOD PRESSURE: 80 MMHG | TEMPERATURE: 97.8 F | WEIGHT: 196 LBS | HEART RATE: 71 BPM | SYSTOLIC BLOOD PRESSURE: 121 MMHG | BODY MASS INDEX: 29.8 KG/M2 | OXYGEN SATURATION: 98 %

## 2019-04-16 LAB
ANISOCYTOSIS BLD QL: SLIGHT — SIGNIFICANT CHANGE UP
BLASTS # FLD: 3 % — HIGH (ref 0–0)
DACRYOCYTES BLD QL SMEAR: SLIGHT — SIGNIFICANT CHANGE UP
ELLIPTOCYTES BLD QL SMEAR: SLIGHT — SIGNIFICANT CHANGE UP
EOSINOPHIL # BLD AUTO: 0 K/UL — SIGNIFICANT CHANGE UP (ref 0–0.5)
EOSINOPHIL NFR BLD AUTO: 1 % — SIGNIFICANT CHANGE UP (ref 0–6)
GIANT PLATELETS BLD QL SMEAR: PRESENT — SIGNIFICANT CHANGE UP
HCT VFR BLD CALC: 40.3 % — SIGNIFICANT CHANGE UP (ref 39–50)
HGB BLD-MCNC: 13.4 G/DL — SIGNIFICANT CHANGE UP (ref 13–17)
LG PLATELETS BLD QL AUTO: SLIGHT — SIGNIFICANT CHANGE UP
LYMPHOCYTES # BLD AUTO: 1.5 K/UL — SIGNIFICANT CHANGE UP (ref 1–3.3)
LYMPHOCYTES # BLD AUTO: 14 % — SIGNIFICANT CHANGE UP (ref 13–44)
MACROCYTES BLD QL: SLIGHT — SIGNIFICANT CHANGE UP
MCHC RBC-ENTMCNC: 29.7 PG — SIGNIFICANT CHANGE UP (ref 27–34)
MCHC RBC-ENTMCNC: 33.2 G/DL — SIGNIFICANT CHANGE UP (ref 32–36)
MCV RBC AUTO: 89.4 FL — SIGNIFICANT CHANGE UP (ref 80–100)
METAMYELOCYTES # FLD: 1 % — HIGH (ref 0–0)
MICROCYTES BLD QL: SLIGHT — SIGNIFICANT CHANGE UP
MONOCYTES # BLD AUTO: 2.3 K/UL — HIGH (ref 0–0.9)
MONOCYTES NFR BLD AUTO: 26 % — HIGH (ref 2–14)
MYELOCYTES NFR BLD: 2 % — HIGH (ref 0–0)
NEUTROPHILS # BLD AUTO: 6.3 K/UL — SIGNIFICANT CHANGE UP (ref 1.8–7.4)
NEUTROPHILS NFR BLD AUTO: 50 % — SIGNIFICANT CHANGE UP (ref 43–77)
NRBC # BLD: 1 /100 — HIGH (ref 0–0)
PLAT MORPH BLD: NORMAL — SIGNIFICANT CHANGE UP
PLATELET # BLD AUTO: 379 K/UL — SIGNIFICANT CHANGE UP (ref 150–400)
RBC # BLD: 4.5 M/UL — SIGNIFICANT CHANGE UP (ref 4.2–5.8)
RBC # FLD: 14.7 % — HIGH (ref 10.3–14.5)
RBC BLD AUTO: ABNORMAL
VARIANT LYMPHS # BLD: 3 % — SIGNIFICANT CHANGE UP (ref 0–6)
WBC # BLD: 10.3 K/UL — SIGNIFICANT CHANGE UP (ref 3.8–10.5)
WBC # FLD AUTO: 10.3 K/UL — SIGNIFICANT CHANGE UP (ref 3.8–10.5)

## 2019-04-16 PROCEDURE — 99214 OFFICE O/P EST MOD 30 MIN: CPT

## 2019-04-16 NOTE — HISTORY OF PRESENT ILLNESS
[Disease:__________________________] : Disease: [unfilled] [de-identified] : JAK2, bcr abl, calreticulin negative\par mpl exon 10 + [de-identified] : Feels well. No c/o. Gout has been more active recently. Pruritus tolerable and stable. No SOB, chest pain, HA, visual problems, bleeding, bruising, abdominal pain, leg pain/swelling, foot pain. Recent PSA 5.69.

## 2019-04-16 NOTE — RESULTS/DATA
[FreeTextEntry1] : WBC 10,300 , Hgb 13.4, Hct 40.3, Plts 379,000, Diff pending \par \par 4/2/19\par CMP: albumin 5.2, total bilirubin 1.5

## 2019-04-16 NOTE — ASSESSMENT
[Palliative Care Plan] : not applicable at this time [FreeTextEntry1] : 67 YO M with mpl+ ET. Doing well. Elevated PSA noted.\par \par Plan:\par Observe\par ASA 81 mg daily\par Urology f/u\par RTC 4 months\par \par

## 2019-04-16 NOTE — ADDENDUM
[FreeTextEntry1] : Documented by Lokesh Power acting as a scribe for Dr. Noam Coker on 04/16/2019 \par \par All medical record entries made by the Scribe were at my, Dr. Noam Coker's, direction and personally dictated by me on 04/16/2019 . I have reviewed the chart and agree that the record accurately reflects my personal performance of the history, physical exam, results, assessment and plan. I have also personally directed, reviewed, and agree with the discharge instructions.

## 2019-04-16 NOTE — REASON FOR VISIT
[Myeloproliferative Disorder] : myeloproliferative disorder [Follow-Up Visit] : a follow-up visit for [Thrombocytosis] : thrombocytosis

## 2019-04-16 NOTE — PHYSICAL EXAM
[Fully active, able to carry on all pre-disease performance without restriction] : Status 0 - Fully active, able to carry on all pre-disease performance without restriction [Normal] : affect appropriate [de-identified] : 3 x 3 cm lipoma on posterior left upper extremity

## 2019-04-17 ENCOUNTER — TRANSCRIPTION ENCOUNTER (OUTPATIENT)
Age: 69
End: 2019-04-17

## 2019-04-17 ENCOUNTER — MESSAGE (OUTPATIENT)
Age: 69
End: 2019-04-17

## 2019-04-17 LAB
ALBUMIN SERPL ELPH-MCNC: 5.2 G/DL
ALP BLD-CCNC: 82 U/L
ALT SERPL-CCNC: 28 U/L
ANION GAP SERPL CALC-SCNC: 14 MMOL/L
AST SERPL-CCNC: 30 U/L
BASOPHILS # BLD AUTO: 0.07 K/UL
BASOPHILS NFR BLD AUTO: 0.9 %
BILIRUB SERPL-MCNC: 1.5 MG/DL
BUN SERPL-MCNC: 14 MG/DL
CALCIUM SERPL-MCNC: 9.5 MG/DL
CHLORIDE SERPL-SCNC: 105 MMOL/L
CHOLEST SERPL-MCNC: 79 MG/DL
CHOLEST/HDLC SERPL: 2.7 RATIO
CO2 SERPL-SCNC: 26 MMOL/L
CREAT SERPL-MCNC: 0.79 MG/DL
EOSINOPHIL # BLD AUTO: 0 K/UL
EOSINOPHIL NFR BLD AUTO: 0 %
ESTIMATED AVERAGE GLUCOSE: 114 MG/DL
GLUCOSE SERPL-MCNC: 83 MG/DL
HBA1C MFR BLD HPLC: 5.6 %
HCT VFR BLD CALC: 42.7 %
HDLC SERPL-MCNC: 29 MG/DL
HGB BLD-MCNC: 12.7 G/DL
LDLC SERPL CALC-MCNC: 28 MG/DL
LDLC SERPL DIRECT ASSAY-MCNC: 34 MG/DL
LYMPHOCYTES # BLD AUTO: 0.61 K/UL
LYMPHOCYTES NFR BLD AUTO: 7.8 %
MAN DIFF?: NORMAL
MANUAL DIF COMMENT BLD-IMP: SIGNIFICANT CHANGE UP
MCHC RBC-ENTMCNC: 28.8 PG
MCHC RBC-ENTMCNC: 29.7 GM/DL
MCV RBC AUTO: 96.8 FL
MONOCYTES # BLD AUTO: 1.64 K/UL
MONOCYTES NFR BLD AUTO: 20.9 %
NEUTROPHILS # BLD AUTO: 5.31 K/UL
NEUTROPHILS NFR BLD AUTO: 62.6 %
PLATELET # BLD AUTO: 355 K/UL
POTASSIUM SERPL-SCNC: 4.5 MMOL/L
PROT SERPL-MCNC: 6.9 G/DL
PSA SERPL-MCNC: 5.69 NG/ML
RBC # BLD: 4.41 M/UL
RBC # FLD: 16.4 %
SODIUM SERPL-SCNC: 145 MMOL/L
TRIGL SERPL-MCNC: 112 MG/DL
URATE SERPL-MCNC: 5.6 MG/DL
WBC # FLD AUTO: 7.83 K/UL

## 2019-04-24 ENCOUNTER — MESSAGE (OUTPATIENT)
Age: 69
End: 2019-04-24

## 2019-04-26 ENCOUNTER — OUTPATIENT (OUTPATIENT)
Dept: OUTPATIENT SERVICES | Facility: HOSPITAL | Age: 69
LOS: 1 days | End: 2019-04-26
Payer: MEDICARE

## 2019-04-26 DIAGNOSIS — D47.3 ESSENTIAL (HEMORRHAGIC) THROMBOCYTHEMIA: ICD-10-CM

## 2019-04-29 ENCOUNTER — RESULT REVIEW (OUTPATIENT)
Age: 69
End: 2019-04-29

## 2019-04-29 ENCOUNTER — TRANSCRIPTION ENCOUNTER (OUTPATIENT)
Age: 69
End: 2019-04-29

## 2019-04-29 ENCOUNTER — OUTPATIENT (OUTPATIENT)
Dept: OUTPATIENT SERVICES | Facility: HOSPITAL | Age: 69
LOS: 1 days | End: 2019-04-29
Payer: MEDICARE

## 2019-04-29 ENCOUNTER — APPOINTMENT (OUTPATIENT)
Dept: HEMATOLOGY ONCOLOGY | Facility: CLINIC | Age: 69
End: 2019-04-29
Payer: MEDICARE

## 2019-04-29 VITALS
SYSTOLIC BLOOD PRESSURE: 120 MMHG | OXYGEN SATURATION: 99 % | HEART RATE: 76 BPM | RESPIRATION RATE: 16 BRPM | TEMPERATURE: 97.4 F | BODY MASS INDEX: 30.97 KG/M2 | WEIGHT: 203.7 LBS | DIASTOLIC BLOOD PRESSURE: 80 MMHG

## 2019-04-29 LAB
BASOPHILS # BLD AUTO: 0.1 K/UL — SIGNIFICANT CHANGE UP (ref 0–0.2)
BASOPHILS NFR BLD AUTO: 1 % — SIGNIFICANT CHANGE UP (ref 0–2)
EOSINOPHIL # BLD AUTO: 0.1 K/UL — SIGNIFICANT CHANGE UP (ref 0–0.5)
EOSINOPHIL NFR BLD AUTO: 1.1 % — SIGNIFICANT CHANGE UP (ref 0–6)
HCT VFR BLD CALC: 38.6 % — LOW (ref 39–50)
HGB BLD-MCNC: 13.1 G/DL — SIGNIFICANT CHANGE UP (ref 13–17)
LYMPHOCYTES # BLD AUTO: 1.4 K/UL — SIGNIFICANT CHANGE UP (ref 1–3.3)
LYMPHOCYTES # BLD AUTO: 14 % — SIGNIFICANT CHANGE UP (ref 13–44)
MCHC RBC-ENTMCNC: 29.9 PG — SIGNIFICANT CHANGE UP (ref 27–34)
MCHC RBC-ENTMCNC: 33.9 G/DL — SIGNIFICANT CHANGE UP (ref 32–36)
MCV RBC AUTO: 88 FL — SIGNIFICANT CHANGE UP (ref 80–100)
MONOCYTES # BLD AUTO: 2 K/UL — HIGH (ref 0–0.9)
MONOCYTES NFR BLD AUTO: 19.1 % — HIGH (ref 2–14)
NEUTROPHILS # BLD AUTO: 6.7 K/UL — SIGNIFICANT CHANGE UP (ref 1.8–7.4)
NEUTROPHILS NFR BLD AUTO: 64.8 % — SIGNIFICANT CHANGE UP (ref 43–77)
PLATELET # BLD AUTO: 364 K/UL — SIGNIFICANT CHANGE UP (ref 150–400)
RBC # BLD: 4.38 M/UL — SIGNIFICANT CHANGE UP (ref 4.2–5.8)
RBC # FLD: 14.7 % — HIGH (ref 10.3–14.5)
WBC # BLD: 10.3 K/UL — SIGNIFICANT CHANGE UP (ref 3.8–10.5)
WBC # FLD AUTO: 10.3 K/UL — SIGNIFICANT CHANGE UP (ref 3.8–10.5)

## 2019-04-29 PROCEDURE — 81210 BRAF GENE: CPT

## 2019-04-29 PROCEDURE — 81405 MOPATH PROCEDURE LEVEL 6: CPT

## 2019-04-29 PROCEDURE — 88280 CHROMOSOME KARYOTYPE STUDY: CPT

## 2019-04-29 PROCEDURE — 81245 FLT3 GENE: CPT

## 2019-04-29 PROCEDURE — 88305 TISSUE EXAM BY PATHOLOGIST: CPT | Mod: 26

## 2019-04-29 PROCEDURE — 88189 FLOWCYTOMETRY/READ 16 & >: CPT

## 2019-04-29 PROCEDURE — 81219 CALR GENE COM VARIANTS: CPT

## 2019-04-29 PROCEDURE — 81272 KIT GENE TARGETED SEQ ANALYS: CPT

## 2019-04-29 PROCEDURE — 88275 CYTOGENETICS 100-300: CPT

## 2019-04-29 PROCEDURE — 88360 TUMOR IMMUNOHISTOCHEM/MANUAL: CPT

## 2019-04-29 PROCEDURE — 88313 SPECIAL STAINS GROUP 2: CPT | Mod: 26

## 2019-04-29 PROCEDURE — 88271 CYTOGENETICS DNA PROBE: CPT

## 2019-04-29 PROCEDURE — 88264 CHROMOSOME ANALYSIS 20-25: CPT

## 2019-04-29 PROCEDURE — 88342 IMHCHEM/IMCYTCHM 1ST ANTB: CPT | Mod: 26,59

## 2019-04-29 PROCEDURE — 81246 FLT3 GENE ANALYSIS: CPT

## 2019-04-29 PROCEDURE — 81402 MOPATH PROCEDURE LEVEL 3: CPT

## 2019-04-29 PROCEDURE — 88313 SPECIAL STAINS GROUP 2: CPT

## 2019-04-29 PROCEDURE — 88341 IMHCHEM/IMCYTCHM EA ADD ANTB: CPT | Mod: 26,59

## 2019-04-29 PROCEDURE — 88341 IMHCHEM/IMCYTCHM EA ADD ANTB: CPT

## 2019-04-29 PROCEDURE — 81120 IDH1 COMMON VARIANTS: CPT

## 2019-04-29 PROCEDURE — 88185 FLOWCYTOMETRY/TC ADD-ON: CPT

## 2019-04-29 PROCEDURE — 87205 SMEAR GRAM STAIN: CPT

## 2019-04-29 PROCEDURE — 88342 IMHCHEM/IMCYTCHM 1ST ANTB: CPT

## 2019-04-29 PROCEDURE — 81270 JAK2 GENE: CPT

## 2019-04-29 PROCEDURE — 81275 KRAS GENE VARIANTS EXON 2: CPT

## 2019-04-29 PROCEDURE — 81170 ABL1 GENE: CPT

## 2019-04-29 PROCEDURE — 81276 KRAS GENE ADDL VARIANTS: CPT

## 2019-04-29 PROCEDURE — 81121 IDH2 COMMON VARIANTS: CPT

## 2019-04-29 PROCEDURE — 88360 TUMOR IMMUNOHISTOCHEM/MANUAL: CPT | Mod: 26

## 2019-04-29 PROCEDURE — 85097 BONE MARROW INTERPRETATION: CPT

## 2019-04-29 PROCEDURE — 88184 FLOWCYTOMETRY/ TC 1 MARKER: CPT

## 2019-04-29 PROCEDURE — 88237 TISSUE CULTURE BONE MARROW: CPT

## 2019-04-29 PROCEDURE — 81311 NRAS GENE VARIANTS EXON 2&3: CPT

## 2019-04-29 PROCEDURE — 81310 NPM1 GENE: CPT

## 2019-04-29 PROCEDURE — 81403 MOPATH PROCEDURE LEVEL 4: CPT

## 2019-04-29 PROCEDURE — 88305 TISSUE EXAM BY PATHOLOGIST: CPT

## 2019-04-29 PROCEDURE — 38222 DX BONE MARROW BX & ASPIR: CPT | Mod: RT

## 2019-04-29 NOTE — PROCEDURE
[Bone Marrow Biopsy] : bone marrow biopsy [Bone Marrow Aspiration] : bone marrow aspiration  [Patient] : the patient [Verbal Consent Obtained] : verbal consent was obtained prior to the procedure [Patient identification verified] : patient identification verified [Procedure verified and consent obtained] : procedure verified and consent obtained [Laterality verified and correct site marked] : laterality verified and correct site marked [Right] : site: right [Correct positioning] : correct positioning [Correct implant and/ or special equipment obtained] : correct impact and/ or special equipment obtained [Prone] : prone [Superior iliac spine was identified] : the superior iliac spine was identified. [The right posterior iliac crest was prepped with betadine and draped, using sterile technique.] : The right posterior iliac crest was prepped with betadine and draped, using sterile technique. [Lidocaine was injected and into the periosteum overlying the site.] : Lidocaine was injected and into the periosteum overlying the site. [Aspirate] : aspirate [Cytogenetics] : cytogenetics [FISH] : FISH [Other ___] : [unfilled] [Biopsy] : biopsy [Flow Cytometry] : flow cytometry [] : The patient was instructed to remove the bandage the following AM. The patient may bathe. Acetaminophen may be taken for discomfort, as per package directions.If there are any other problems, the patient was instructed to call the office. The patient verbalized understanding, and is aware of the office contact numbers. [FreeTextEntry1] : 69 yrs old M h/o ET  and 3 % blast,  here for bone marrow biopsy r/o lymphoma

## 2019-04-29 NOTE — REASON FOR VISIT
[Bone Marrow Biopsy] : bone marrow biopsy [Bone Marrow Aspiration] : bone marrow aspiration [FreeTextEntry2] : 69 yrs old M h/o ET found to have 3 % yuli , there for bone marrow biopsy r/o lymphoma

## 2019-05-01 LAB — TM INTERPRETATION: SIGNIFICANT CHANGE UP

## 2019-05-02 LAB — HEMATOPATHOLOGY REPORT: SIGNIFICANT CHANGE UP

## 2019-05-04 LAB — PSA SERPL-MCNC: 6.43 NG/ML

## 2019-05-06 LAB
BLUEBERRY IGG RAST: (no result)
BROCCOLI IGG RAST: SIGNIFICANT CHANGE UP
CABBAGE IGG RAST: SIGNIFICANT CHANGE UP
CARDAMON IGE RAST: SIGNIFICANT CHANGE UP
CARP IGE RAST: SIGNIFICANT CHANGE UP
CARROT IGG RAST: SIGNIFICANT CHANGE UP
CAULIFLOWER IGG RAST: SIGNIFICANT CHANGE UP
ONKOSIGHT MYELOID SEQUENCE: (no result)

## 2019-05-10 LAB — CHROM ANALY OVERALL INTERP SPEC-IMP: SIGNIFICANT CHANGE UP

## 2019-05-14 LAB — CHROM ANALY INTERPHASE BLD FISH-IMP: SIGNIFICANT CHANGE UP

## 2019-05-16 DIAGNOSIS — D47.1 CHRONIC MYELOPROLIFERATIVE DISEASE: ICD-10-CM

## 2019-06-06 ENCOUNTER — APPOINTMENT (OUTPATIENT)
Dept: GASTROENTEROLOGY | Facility: CLINIC | Age: 69
End: 2019-06-06
Payer: MEDICARE

## 2019-06-06 VITALS
BODY MASS INDEX: 29.55 KG/M2 | SYSTOLIC BLOOD PRESSURE: 123 MMHG | WEIGHT: 195 LBS | HEART RATE: 78 BPM | DIASTOLIC BLOOD PRESSURE: 81 MMHG | HEIGHT: 68 IN

## 2019-06-06 DIAGNOSIS — K21.0 GASTRO-ESOPHAGEAL REFLUX DISEASE WITH ESOPHAGITIS: ICD-10-CM

## 2019-06-06 DIAGNOSIS — K63.5 POLYP OF COLON: ICD-10-CM

## 2019-06-06 DIAGNOSIS — R19.4 CHANGE IN BOWEL HABIT: ICD-10-CM

## 2019-06-06 DIAGNOSIS — Z86.010 PERSONAL HISTORY OF COLONIC POLYPS: ICD-10-CM

## 2019-06-06 PROCEDURE — 99204 OFFICE O/P NEW MOD 45 MIN: CPT

## 2019-06-06 PROCEDURE — 82274 ASSAY TEST FOR BLOOD FECAL: CPT | Mod: QW

## 2019-06-06 NOTE — REVIEW OF SYSTEMS
[Loss Of Hearing] : hearing loss [Nasal Discharge] : nasal discharge [Leg Claudication] : intermittent leg claudication [Constipation] : constipation [Cough] : cough [Diarrhea] : diarrhea [Heartburn] : heartburn [Arthralgias] : arthralgias [Hesitancy] : urinary hesitancy [Joint Pain] : joint pain [Joint Stiffness] : joint stiffness [Itching] : itching [Erectile Dysfunction] : erectile dysfunction [Easy Bleeding] : a tendency for easy bleeding [Negative] : Heme/Lymph [As Noted in HPI] : as noted in HPI

## 2019-06-06 NOTE — HISTORY OF PRESENT ILLNESS
[FreeTextEntry1] : A polyp was removed at first colonoscopy at age 41, complicated by bleed two days hence. Polyps have been noted on a couple of occasions since then, although only pandiverticulosis and hemorrhoids were seen at last colonoscopy November 2014 (Dr. Hernandez). Lately, he has noted subtle change in bowels, constipation at times (better on MiraLax), but with sense of incomplete evacuation.  He was advised of spastic colon in his 20s. He has seen blood on wiping from time to time. He has been taking omeprazole for many years because of GERD, known to have reflux esophagitis and Schatzki ring. His maternal grandfather was diagnosed with colon cancer at about age 75, and his mother and maternal uncle had colonic polyps.

## 2019-06-06 NOTE — ASSESSMENT
[FreeTextEntry1] : 1. Recent change in bowels with tenesmus and episodic rectal bleeding; history of colonic polyps, with pandiverticulosis and hemorrhoids at last colonoscopy November 2014; family history of colon cancer (grandfather) & colon polyps (mother, uncle)--rule out colitis, metachronous colorectal neoplasia.\par 2. Chronic GERD, with reflux esophagitis and Schatzki ring at last EGD November 2014.\par 3. Essential thrombocytosis, myeloproliferative disease, followed by Dr. Coker; maintained on ASA.\par 4. Status post surgery 1998 for right acoustic neuroma.\par 5. Overweight.\par 6. Hypertension.\par 7. Hyperlipidemia.\par 8. Mitral regurgitation, mild.\par 9. History of gout.\par 10. Status post lumbar discectomy, T&A, lateral patellar tendon repairs, vasectomy.\par 11. Allergic to codeine.\par \par Plan:\par 1. Medical records reviewed.\par 2. Schedule repeat colonoscopy-- Procedure, rationale, alternatives, material risks, anesthesia plan, PEG prep instructions were reviewed and brochure given.\par 3. Other recommendations to follow.

## 2019-06-06 NOTE — PHYSICAL EXAM
[General Appearance - Alert] : alert [General Appearance - In No Acute Distress] : in no acute distress [General Appearance - Well Developed] : well developed [General Appearance - Well Nourished] : well nourished [Sclera] : the sclera and conjunctiva were normal [Outer Ear] : the ears and nose were normal in appearance [Oropharynx] : the oropharynx was normal [Neck Cervical Mass (___cm)] : no neck mass was observed [Neck Appearance] : the appearance of the neck was normal [Jugular Venous Distention Increased] : there was no jugular-venous distention [Thyroid Diffuse Enlargement] : the thyroid was not enlarged [Thyroid Nodule] : there were no palpable thyroid nodules [Auscultation Breath Sounds / Voice Sounds] : lungs were clear to auscultation bilaterally [Heart Sounds] : normal S1 and S2 [Heart Rate And Rhythm] : heart rate was normal and rhythm regular [Heart Sounds Gallop] : no gallops [Full Pulse] : the pedal pulses are present [Heart Sounds Pericardial Friction Rub] : no pericardial rub [Bowel Sounds] : normal bowel sounds [Edema] : there was no peripheral edema [Abdomen Tenderness] : non-tender [Abdomen Soft] : soft [Abdomen Hernia] : no hernia was discovered [Abdomen Mass (___ Cm)] : no abdominal mass palpated [Normal Sphincter Tone] : normal sphincter tone [No Rectal Mass] : no rectal mass [Internal Hemorrhoid] : internal hemorrhoids [Cervical Lymph Nodes Enlarged Posterior Bilaterally] : posterior cervical [Prostate Size___ (Scale 0-4)] : prostate size was [unfilled] on a scale of 0-4 [Axillary Lymph Nodes Enlarged Bilaterally] : axillary [Cervical Lymph Nodes Enlarged Anterior Bilaterally] : anterior cervical [Supraclavicular Lymph Nodes Enlarged Bilaterally] : supraclavicular [Inguinal Lymph Nodes Enlarged Bilaterally] : inguinal [Femoral Lymph Nodes Enlarged Bilaterally] : femoral [No CVA Tenderness] : no ~M costovertebral angle tenderness [Abnormal Walk] : normal gait [No Spinal Tenderness] : no spinal tenderness [Musculoskeletal - Swelling] : no joint swelling seen [Motor Tone] : muscle strength and tone were normal [Nail Clubbing] : no clubbing  or cyanosis of the fingernails [Skin Turgor] : normal skin turgor [Skin Color & Pigmentation] : normal skin color and pigmentation [] : no rash [Affect] : the affect was normal [Impaired Insight] : insight and judgment were intact [Oriented To Time, Place, And Person] : oriented to person, place, and time [External Hemorrhoid] : no external hemorrhoids [Prostate Tenderness] : was not tender [Occult Blood Positive] : stool was negative for occult blood [FreeTextEntry1] : lumbar discectomy

## 2019-06-06 NOTE — CONSULT LETTER
[Consult Letter:] : I had the pleasure of evaluating your patient, [unfilled]. [Dear  ___] : Dear  [unfilled], [Sincerely,] : Sincerely, [Consult Closing:] : Thank you very much for allowing me to participate in the care of this patient.  If you have any questions, please do not hesitate to contact me. [Please see my note below.] : Please see my note below. [FreeTextEntry3] : Noam Riley M.D.\par  [DrGuille  ___] : Dr. MONTES

## 2019-06-12 ENCOUNTER — RX RENEWAL (OUTPATIENT)
Age: 69
End: 2019-06-12

## 2019-07-02 ENCOUNTER — NON-APPOINTMENT (OUTPATIENT)
Age: 69
End: 2019-07-02

## 2019-07-02 ENCOUNTER — APPOINTMENT (OUTPATIENT)
Dept: CARDIOLOGY | Facility: CLINIC | Age: 69
End: 2019-07-02
Payer: MEDICARE

## 2019-07-02 VITALS — HEART RATE: 70 BPM | OXYGEN SATURATION: 97 % | BODY MASS INDEX: 30.56 KG/M2 | WEIGHT: 201 LBS

## 2019-07-02 VITALS — DIASTOLIC BLOOD PRESSURE: 72 MMHG | SYSTOLIC BLOOD PRESSURE: 126 MMHG

## 2019-07-02 DIAGNOSIS — M10.9 GOUT, UNSPECIFIED: ICD-10-CM

## 2019-07-02 DIAGNOSIS — R97.20 ELEVATED PROSTATE, SPECIFIC ANTIGEN [PSA]: ICD-10-CM

## 2019-07-02 PROCEDURE — 93000 ELECTROCARDIOGRAM COMPLETE: CPT

## 2019-07-02 PROCEDURE — 99214 OFFICE O/P EST MOD 30 MIN: CPT

## 2019-07-02 NOTE — HISTORY OF PRESENT ILLNESS
[FreeTextEntry1] : He denies chest pain, shortness of breath, and palpitations.\par Saw Dr. Dooley. 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 Roman is watching his PSAs, which improved after a course of antibiotics. He will see him again in 1-2 weeks. \par He is scheduled for a colonoscopy next week.\par A bone marrow biopsy with Dr. Coker

## 2019-07-10 ENCOUNTER — LABORATORY RESULT (OUTPATIENT)
Age: 69
End: 2019-07-10

## 2019-07-10 ENCOUNTER — APPOINTMENT (OUTPATIENT)
Dept: GASTROENTEROLOGY | Facility: CLINIC | Age: 69
End: 2019-07-10
Payer: MEDICARE

## 2019-07-10 PROCEDURE — 45380 COLONOSCOPY AND BIOPSY: CPT

## 2019-07-30 ENCOUNTER — APPOINTMENT (OUTPATIENT)
Dept: GASTROENTEROLOGY | Facility: CLINIC | Age: 69
End: 2019-07-30
Payer: MEDICARE

## 2019-07-30 DIAGNOSIS — Z83.71 FAMILY HISTORY OF COLONIC POLYPS: ICD-10-CM

## 2019-07-30 DIAGNOSIS — K57.30 DIVERTICULOSIS OF LARGE INTESTINE W/OUT PERFORATION OR ABSCESS W/OUT BLEEDING: ICD-10-CM

## 2019-07-30 DIAGNOSIS — K21.0 GASTRO-ESOPHAGEAL REFLUX DISEASE WITH ESOPHAGITIS: ICD-10-CM

## 2019-07-30 PROCEDURE — 99214 OFFICE O/P EST MOD 30 MIN: CPT

## 2019-07-30 NOTE — HISTORY OF PRESENT ILLNESS
[FreeTextEntry1] : Repeat colonoscopy 7/10/19 revealed small ileal ulcer (biopsies= focal acute inflammation, no evidence of chronicity), pandiverticulosis, and hemorrhoids. He has been taking Benefiber regularly, but sometimes missing the second dose later in the day. Since the procedure, his bowels have improved. Lincoln continues on baby aspirin daily. A copy of the biopsy report was given.

## 2019-07-30 NOTE — CONSULT LETTER
[Dear  ___] : Dear  [unfilled], [Courtesy Letter:] : I had the pleasure of seeing your patient, [unfilled], in my office today. [Please see my note below.] : Please see my note below. [Sincerely,] : Sincerely, [Consult Closing:] : Thank you very much for allowing me to participate in the care of this patient.  If you have any questions, please do not hesitate to contact me. [FreeTextEntry3] : Noam Riley M.D.\par  [DrGuille  ___] : Dr. MONTES

## 2019-07-30 NOTE — ASSESSMENT
[FreeTextEntry1] : 1. History of colonic polyps, with ileal ulcer (uspect aspirin-induced), pandiverticulosis and hemorrhoids at repeat colonoscopy July 2019; family history of colon cancer (grandfather) & colon polyps (mother, uncle).\par 2. Chronic GERD, with reflux esophagitis and Schatzki ring at last EGD November 2014.\par 3. Essential thrombocytosis, myeloproliferative disease, followed by Dr. Coker; maintained on ASA.\par 4. Status post surgery 1998 for right acoustic neuroma.\par 5. Overweight.\par 6. Hypertension.\par 7. Hyperlipidemia.\par 8. Mitral regurgitation, mild.\par 9. History of gout.\par 10. Status post lumbar discectomy, T&A, lateral patellar tendon repairs, vasectomy.\par 11. Allergic to codeine.\par \par Plan:\par 1. Repeat colonoscopy in 5 years, if the potential benefits exceed the risks by that time.\par 2. Despite the likelihood of the ileal ulcer being aspirin-induced, I suspect that ASA should be continued indefinitely.\par 3. Follow-up with Dr. Coker next week, as scheduled.

## 2019-08-02 ENCOUNTER — OUTPATIENT (OUTPATIENT)
Dept: OUTPATIENT SERVICES | Facility: HOSPITAL | Age: 69
LOS: 1 days | Discharge: HOME | End: 2019-08-02

## 2019-08-02 DIAGNOSIS — D47.3 ESSENTIAL (HEMORRHAGIC) THROMBOCYTHEMIA: ICD-10-CM

## 2019-08-06 ENCOUNTER — APPOINTMENT (OUTPATIENT)
Dept: HEMATOLOGY ONCOLOGY | Facility: CLINIC | Age: 69
End: 2019-08-06
Payer: MEDICARE

## 2019-08-06 ENCOUNTER — RESULT REVIEW (OUTPATIENT)
Age: 69
End: 2019-08-06

## 2019-08-06 VITALS
WEIGHT: 198.5 LBS | RESPIRATION RATE: 14 BRPM | DIASTOLIC BLOOD PRESSURE: 79 MMHG | HEART RATE: 63 BPM | BODY MASS INDEX: 30.19 KG/M2 | SYSTOLIC BLOOD PRESSURE: 125 MMHG | OXYGEN SATURATION: 98 % | TEMPERATURE: 98.5 F

## 2019-08-06 LAB
BASOPHILS # BLD AUTO: 0.1 K/UL — SIGNIFICANT CHANGE UP (ref 0–0.2)
BASOPHILS NFR BLD AUTO: 0.9 % — SIGNIFICANT CHANGE UP (ref 0–2)
EOSINOPHIL # BLD AUTO: 0 K/UL — SIGNIFICANT CHANGE UP (ref 0–0.5)
EOSINOPHIL NFR BLD AUTO: 0.3 % — SIGNIFICANT CHANGE UP (ref 0–6)
HCT VFR BLD CALC: 39.7 % — SIGNIFICANT CHANGE UP (ref 39–50)
HGB BLD-MCNC: 13.5 G/DL — SIGNIFICANT CHANGE UP (ref 13–17)
LYMPHOCYTES # BLD AUTO: 1.7 K/UL — SIGNIFICANT CHANGE UP (ref 1–3.3)
LYMPHOCYTES # BLD AUTO: 17.1 % — SIGNIFICANT CHANGE UP (ref 13–44)
MCHC RBC-ENTMCNC: 30.8 PG — SIGNIFICANT CHANGE UP (ref 27–34)
MCHC RBC-ENTMCNC: 33.9 G/DL — SIGNIFICANT CHANGE UP (ref 32–36)
MCV RBC AUTO: 90.8 FL — SIGNIFICANT CHANGE UP (ref 80–100)
MONOCYTES # BLD AUTO: 2 K/UL — HIGH (ref 0–0.9)
MONOCYTES NFR BLD AUTO: 20.9 % — HIGH (ref 2–14)
NEUTROPHILS # BLD AUTO: 6 K/UL — SIGNIFICANT CHANGE UP (ref 1.8–7.4)
NEUTROPHILS NFR BLD AUTO: 60.8 % — SIGNIFICANT CHANGE UP (ref 43–77)
PLATELET # BLD AUTO: 329 K/UL — SIGNIFICANT CHANGE UP (ref 150–400)
RBC # BLD: 4.38 M/UL — SIGNIFICANT CHANGE UP (ref 4.2–5.8)
RBC # FLD: 15.8 % — HIGH (ref 10.3–14.5)
WBC # BLD: 9.8 K/UL — SIGNIFICANT CHANGE UP (ref 3.8–10.5)
WBC # FLD AUTO: 9.8 K/UL — SIGNIFICANT CHANGE UP (ref 3.8–10.5)

## 2019-08-06 PROCEDURE — 99214 OFFICE O/P EST MOD 30 MIN: CPT

## 2019-08-06 NOTE — ADDENDUM
[FreeTextEntry1] : Documented by Rigo Cerna acting as a scribe for Dr. Noam Coker on 08/06/2019 \par \par All medical record entries made by the Scribe were at my, Dr. Noam Coker's, direction and personally dictated by me on 08/06/2019. I have reviewed the chart and agree that the record accurately reflects my personal performance of the history, physical exam, results, assessment and plan. I have also personally directed, reviewed, and agree with the discharge instructions.

## 2019-08-06 NOTE — RESULTS/DATA
[FreeTextEntry1] : WBC 9,800, Hgb 13.5, Hct 39.7, Plts 329,000, Diff  61 P, 17 L, 21 M, 1 Ba, ANC 6,000\par \par  07/10/19\par 1. Cecum, biopsy: Colonic mucosa with no significant histopathologic findings. 2. Terminal ileum, biopsy:Small intestinal mucosa with focal acute inflammation, see note. 3. Colon, sigmoid, biopsy: Colonic mucosa with no significant histopathologic findings. 4. Rectum, biopsy:Colonic mucosa with no significant histopathologic findings. \par Impressions: non-bleeding external and internal hemorrhoids. Pandiverticulosis. A single small ulcer in the terminal ileum--? Crohns, aspirin-induced, ischemic, AFB, etc., Biopsied. Rule out microscopic colitis.\par \par 04/29/19\par Bone marrow biopsy and bone marrow aspirate - Myeloproliferative neoplasm with myelofibrosis (grade 2-3 of 3) and monocytosis.\par Results show no increase of CD34+ blasts, no increase of mast cells; erythroid and myeloid cells are markedly decreased. Megakaryocytes are +for CD61 and factor VIII.\par Cytogenetics: Result: Abnormal male karyotype; Karyotype: 46,XY,del(13)(q12q14)[8]/46,XY[12]\par \par FISH: Result: NORMAL FISH - KMT2A; Probe(s) and Location(s): KMT2A (11q23)\par \par

## 2019-08-06 NOTE — HISTORY OF PRESENT ILLNESS
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 No complaints. Pruritus tolerable and stable. He notes no SOB, chest pain, headaches, visual problems, abdominal pain, bleeding, bruising, leg pain/swelling, foot pain. Recent colonoscopy found ileal ulcer.

## 2019-08-06 NOTE — PHYSICAL EXAM
[Fully active, able to carry on all pre-disease performance without restriction] : Status 0 - Fully active, able to carry on all pre-disease performance without restriction [Normal] : affect appropriate [de-identified] : 3 x 3 cm lipoma on posterior left upper extremity

## 2019-08-06 NOTE — ASSESSMENT
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 I have reviewed these findings with him and explained their potential implications. He is currently clinically stable and no intervention is required. His myelofibrosis is too advanced to treat with alpha interferon. He was also found to have a small ulceration in his terminal ileum that may be related to aspirin. I believe the benefit of aspirin outweighs the risk and favor continuing it.\par \par Plan:\par Observe\par ASA 81 mg daily - to use enteric coated\par RTC 3 months\par \par

## 2019-08-08 ENCOUNTER — TRANSCRIPTION ENCOUNTER (OUTPATIENT)
Age: 69
End: 2019-08-08

## 2019-08-09 ENCOUNTER — TRANSCRIPTION ENCOUNTER (OUTPATIENT)
Age: 69
End: 2019-08-09

## 2019-08-16 ENCOUNTER — TRANSCRIPTION ENCOUNTER (OUTPATIENT)
Age: 69
End: 2019-08-16

## 2019-08-21 ENCOUNTER — APPOINTMENT (OUTPATIENT)
Dept: GASTROENTEROLOGY | Facility: CLINIC | Age: 69
End: 2019-08-21

## 2019-09-05 ENCOUNTER — CLINICAL ADVICE (OUTPATIENT)
Age: 69
End: 2019-09-05

## 2019-09-06 ENCOUNTER — TRANSCRIPTION ENCOUNTER (OUTPATIENT)
Age: 69
End: 2019-09-06

## 2019-09-07 ENCOUNTER — RX RENEWAL (OUTPATIENT)
Age: 69
End: 2019-09-07

## 2019-09-07 ENCOUNTER — TRANSCRIPTION ENCOUNTER (OUTPATIENT)
Age: 69
End: 2019-09-07

## 2019-09-11 ENCOUNTER — OUTPATIENT (OUTPATIENT)
Dept: OUTPATIENT SERVICES | Facility: HOSPITAL | Age: 69
LOS: 1 days | Discharge: HOME | End: 2019-09-11

## 2019-09-11 DIAGNOSIS — D47.3 ESSENTIAL (HEMORRHAGIC) THROMBOCYTHEMIA: ICD-10-CM

## 2019-09-13 ENCOUNTER — OUTPATIENT (OUTPATIENT)
Dept: OUTPATIENT SERVICES | Facility: HOSPITAL | Age: 69
LOS: 1 days | End: 2019-09-13
Payer: MEDICARE

## 2019-09-13 ENCOUNTER — RESULT REVIEW (OUTPATIENT)
Age: 69
End: 2019-09-13

## 2019-09-13 ENCOUNTER — APPOINTMENT (OUTPATIENT)
Dept: HEMATOLOGY ONCOLOGY | Facility: CLINIC | Age: 69
End: 2019-09-13

## 2019-09-13 DIAGNOSIS — D47.3 ESSENTIAL (HEMORRHAGIC) THROMBOCYTHEMIA: ICD-10-CM

## 2019-09-13 LAB
BASOPHILS # BLD AUTO: 0.1 K/UL — SIGNIFICANT CHANGE UP (ref 0–0.2)
BASOPHILS NFR BLD AUTO: 0.8 % — SIGNIFICANT CHANGE UP (ref 0–2)
BLD GP AB SCN SERPL QL: NEGATIVE — SIGNIFICANT CHANGE UP
EOSINOPHIL # BLD AUTO: 0 K/UL — SIGNIFICANT CHANGE UP (ref 0–0.5)
EOSINOPHIL NFR BLD AUTO: 0.1 % — SIGNIFICANT CHANGE UP (ref 0–6)
HCT VFR BLD CALC: 42 % — SIGNIFICANT CHANGE UP (ref 39–50)
HGB BLD-MCNC: 13.7 G/DL — SIGNIFICANT CHANGE UP (ref 13–17)
LYMPHOCYTES # BLD AUTO: 1.7 K/UL — SIGNIFICANT CHANGE UP (ref 1–3.3)
LYMPHOCYTES # BLD AUTO: 11.3 % — LOW (ref 13–44)
MCHC RBC-ENTMCNC: 29.9 PG — SIGNIFICANT CHANGE UP (ref 27–34)
MCHC RBC-ENTMCNC: 32.6 G/DL — SIGNIFICANT CHANGE UP (ref 32–36)
MCV RBC AUTO: 91.7 FL — SIGNIFICANT CHANGE UP (ref 80–100)
MONOCYTES # BLD AUTO: 3.7 K/UL — HIGH (ref 0–0.9)
MONOCYTES NFR BLD AUTO: 25 % — HIGH (ref 2–14)
NEUTROPHILS # BLD AUTO: 9.3 K/UL — HIGH (ref 1.8–7.4)
NEUTROPHILS NFR BLD AUTO: 62.8 % — SIGNIFICANT CHANGE UP (ref 43–77)
PLATELET # BLD AUTO: 374 K/UL — SIGNIFICANT CHANGE UP (ref 150–400)
RBC # BLD: 4.58 M/UL — SIGNIFICANT CHANGE UP (ref 4.2–5.8)
RBC # FLD: 15.4 % — HIGH (ref 10.3–14.5)
RH IG SCN BLD-IMP: POSITIVE — SIGNIFICANT CHANGE UP
WBC # BLD: 14.7 K/UL — HIGH (ref 3.8–10.5)
WBC # FLD AUTO: 14.7 K/UL — HIGH (ref 3.8–10.5)

## 2019-09-13 PROCEDURE — 86850 RBC ANTIBODY SCREEN: CPT

## 2019-09-13 PROCEDURE — 86901 BLOOD TYPING SEROLOGIC RH(D): CPT

## 2019-09-13 PROCEDURE — 86900 BLOOD TYPING SEROLOGIC ABO: CPT

## 2019-09-17 ENCOUNTER — RESULT REVIEW (OUTPATIENT)
Age: 69
End: 2019-09-17

## 2019-09-17 ENCOUNTER — APPOINTMENT (OUTPATIENT)
Dept: INFUSION THERAPY | Facility: HOSPITAL | Age: 69
End: 2019-09-17

## 2019-09-17 LAB
BASOPHILS # BLD AUTO: 0.1 K/UL — SIGNIFICANT CHANGE UP (ref 0–0.2)
BASOPHILS NFR BLD AUTO: 0.6 % — SIGNIFICANT CHANGE UP (ref 0–2)
EOSINOPHIL # BLD AUTO: 0.1 K/UL — SIGNIFICANT CHANGE UP (ref 0–0.5)
EOSINOPHIL NFR BLD AUTO: 0.7 % — SIGNIFICANT CHANGE UP (ref 0–6)
HCT VFR BLD CALC: 42.4 % — SIGNIFICANT CHANGE UP (ref 39–50)
HGB BLD-MCNC: 14 G/DL — SIGNIFICANT CHANGE UP (ref 13–17)
LYMPHOCYTES # BLD AUTO: 13.4 % — SIGNIFICANT CHANGE UP (ref 13–44)
LYMPHOCYTES # BLD AUTO: 2 K/UL — SIGNIFICANT CHANGE UP (ref 1–3.3)
MCHC RBC-ENTMCNC: 29.8 PG — SIGNIFICANT CHANGE UP (ref 27–34)
MCHC RBC-ENTMCNC: 33 G/DL — SIGNIFICANT CHANGE UP (ref 32–36)
MCV RBC AUTO: 90.2 FL — SIGNIFICANT CHANGE UP (ref 80–100)
MONOCYTES # BLD AUTO: 3.3 K/UL — HIGH (ref 0–0.9)
MONOCYTES NFR BLD AUTO: 22.6 % — HIGH (ref 2–14)
NEUTROPHILS # BLD AUTO: 9.2 K/UL — HIGH (ref 1.8–7.4)
NEUTROPHILS NFR BLD AUTO: 62.6 % — SIGNIFICANT CHANGE UP (ref 43–77)
PLATELET # BLD AUTO: 328 K/UL — SIGNIFICANT CHANGE UP (ref 150–400)
RBC # BLD: 4.7 M/UL — SIGNIFICANT CHANGE UP (ref 4.2–5.8)
RBC # FLD: 15.4 % — HIGH (ref 10.3–14.5)
WBC # BLD: 14.7 K/UL — HIGH (ref 3.8–10.5)
WBC # FLD AUTO: 14.7 K/UL — HIGH (ref 3.8–10.5)

## 2019-09-18 DIAGNOSIS — Z51.89 ENCOUNTER FOR OTHER SPECIFIED AFTERCARE: ICD-10-CM

## 2019-09-19 ENCOUNTER — INPATIENT (INPATIENT)
Facility: HOSPITAL | Age: 69
LOS: 7 days | Discharge: HOME | DRG: 862 | End: 2019-09-27
Attending: UROLOGY | Admitting: UROLOGY
Payer: MEDICARE

## 2019-09-19 VITALS
HEART RATE: 118 BPM | DIASTOLIC BLOOD PRESSURE: 89 MMHG | SYSTOLIC BLOOD PRESSURE: 166 MMHG | RESPIRATION RATE: 18 BRPM | WEIGHT: 197.09 LBS | OXYGEN SATURATION: 96 % | TEMPERATURE: 99 F | HEIGHT: 68 IN

## 2019-09-19 DIAGNOSIS — R50.9 FEVER, UNSPECIFIED: ICD-10-CM

## 2019-09-19 LAB
ALBUMIN SERPL ELPH-MCNC: 4.8 G/DL — SIGNIFICANT CHANGE UP (ref 3.3–5)
ALP SERPL-CCNC: 103 U/L — SIGNIFICANT CHANGE UP (ref 40–120)
ALT FLD-CCNC: 48 U/L — HIGH (ref 10–45)
ANION GAP SERPL CALC-SCNC: 17 MMOL/L — SIGNIFICANT CHANGE UP (ref 5–17)
APPEARANCE UR: CLEAR — SIGNIFICANT CHANGE UP
AST SERPL-CCNC: 41 U/L — HIGH (ref 10–40)
BACTERIA # UR AUTO: NEGATIVE — SIGNIFICANT CHANGE UP
BASE EXCESS BLDV CALC-SCNC: 2.5 MMOL/L — HIGH (ref -2–2)
BASOPHILS # BLD AUTO: 0 K/UL — SIGNIFICANT CHANGE UP (ref 0–0.2)
BILIRUB SERPL-MCNC: 1.4 MG/DL — HIGH (ref 0.2–1.2)
BILIRUB UR-MCNC: NEGATIVE — SIGNIFICANT CHANGE UP
BUN SERPL-MCNC: 16 MG/DL — SIGNIFICANT CHANGE UP (ref 7–23)
CA-I SERPL-SCNC: 1.14 MMOL/L — SIGNIFICANT CHANGE UP (ref 1.12–1.3)
CALCIUM SERPL-MCNC: 9.8 MG/DL — SIGNIFICANT CHANGE UP (ref 8.4–10.5)
CHLORIDE BLDV-SCNC: 105 MMOL/L — SIGNIFICANT CHANGE UP (ref 96–108)
CHLORIDE SERPL-SCNC: 100 MMOL/L — SIGNIFICANT CHANGE UP (ref 96–108)
CO2 BLDV-SCNC: 27 MMOL/L — SIGNIFICANT CHANGE UP (ref 22–30)
CO2 SERPL-SCNC: 23 MMOL/L — SIGNIFICANT CHANGE UP (ref 22–31)
COLOR SPEC: YELLOW — SIGNIFICANT CHANGE UP
CREAT SERPL-MCNC: 0.89 MG/DL — SIGNIFICANT CHANGE UP (ref 0.5–1.3)
DIFF PNL FLD: ABNORMAL
EOSINOPHIL # BLD AUTO: 0 K/UL — SIGNIFICANT CHANGE UP (ref 0–0.5)
EPI CELLS # UR: 0 — SIGNIFICANT CHANGE UP
GAS PNL BLDV: 137 MMOL/L — SIGNIFICANT CHANGE UP (ref 135–145)
GAS PNL BLDV: SIGNIFICANT CHANGE UP
GAS PNL BLDV: SIGNIFICANT CHANGE UP
GLUCOSE BLDV-MCNC: 114 MG/DL — HIGH (ref 70–99)
GLUCOSE SERPL-MCNC: 118 MG/DL — HIGH (ref 70–99)
GLUCOSE UR QL: NEGATIVE — SIGNIFICANT CHANGE UP
HCO3 BLDV-SCNC: 26 MMOL/L — SIGNIFICANT CHANGE UP (ref 21–29)
HCT VFR BLD CALC: 41 % — SIGNIFICANT CHANGE UP (ref 39–50)
HCT VFR BLDA CALC: 41 % — SIGNIFICANT CHANGE UP (ref 39–50)
HGB BLD CALC-MCNC: 13.3 G/DL — SIGNIFICANT CHANGE UP (ref 13–17)
HGB BLD-MCNC: 13 G/DL — SIGNIFICANT CHANGE UP (ref 13–17)
HYALINE CASTS # UR AUTO: 0 /LPF — SIGNIFICANT CHANGE UP (ref 0–7)
KETONES UR-MCNC: NEGATIVE — SIGNIFICANT CHANGE UP
LACTATE BLDV-MCNC: 1.7 MMOL/L — SIGNIFICANT CHANGE UP (ref 0.7–2)
LEUKOCYTE ESTERASE UR-ACNC: ABNORMAL
LYMPHOCYTES # BLD AUTO: 0.8 K/UL — LOW (ref 1–3.3)
LYMPHOCYTES # BLD AUTO: 5 % — LOW (ref 13–44)
MCHC RBC-ENTMCNC: 28.4 PG — SIGNIFICANT CHANGE UP (ref 27–34)
MCHC RBC-ENTMCNC: 31.8 GM/DL — LOW (ref 32–36)
MCV RBC AUTO: 89.3 FL — SIGNIFICANT CHANGE UP (ref 80–100)
MONOCYTES # BLD AUTO: 8.1 K/UL — HIGH (ref 0–0.9)
MONOCYTES NFR BLD AUTO: 30 % — HIGH (ref 2–14)
MYELOCYTES NFR BLD: 3 % — HIGH (ref 0–0)
NEUTROPHILS # BLD AUTO: 12.5 K/UL — HIGH (ref 1.8–7.4)
NEUTROPHILS NFR BLD AUTO: 60 % — SIGNIFICANT CHANGE UP (ref 43–77)
NEUTS BAND # BLD: 1 % — SIGNIFICANT CHANGE UP (ref 0–8)
NITRITE UR-MCNC: NEGATIVE — SIGNIFICANT CHANGE UP
NRBC # BLD: 2 /100 — HIGH (ref 0–0)
PCO2 BLDV: 35 MMHG — SIGNIFICANT CHANGE UP (ref 35–50)
PH BLDV: 7.47 — HIGH (ref 7.35–7.45)
PH UR: 6 — SIGNIFICANT CHANGE UP (ref 5–8)
PLAT MORPH BLD: NORMAL — SIGNIFICANT CHANGE UP
PLATELET # BLD AUTO: 342 K/UL — SIGNIFICANT CHANGE UP (ref 150–400)
PO2 BLDV: 44 MMHG — SIGNIFICANT CHANGE UP (ref 25–45)
POTASSIUM BLDV-SCNC: 3.3 MMOL/L — LOW (ref 3.5–5.3)
POTASSIUM SERPL-MCNC: 3.6 MMOL/L — SIGNIFICANT CHANGE UP (ref 3.5–5.3)
POTASSIUM SERPL-SCNC: 3.6 MMOL/L — SIGNIFICANT CHANGE UP (ref 3.5–5.3)
PROMYELOCYTES # FLD: 1 % — HIGH (ref 0–0)
PROT SERPL-MCNC: 7.2 G/DL — SIGNIFICANT CHANGE UP (ref 6–8.3)
PROT UR-MCNC: ABNORMAL
RBC # BLD: 4.59 M/UL — SIGNIFICANT CHANGE UP (ref 4.2–5.8)
RBC # FLD: 14.4 % — SIGNIFICANT CHANGE UP (ref 10.3–14.5)
RBC BLD AUTO: SIGNIFICANT CHANGE UP
RBC CASTS # UR COMP ASSIST: 4 /HPF — SIGNIFICANT CHANGE UP (ref 0–4)
SAO2 % BLDV: 78 % — SIGNIFICANT CHANGE UP (ref 67–88)
SODIUM SERPL-SCNC: 140 MMOL/L — SIGNIFICANT CHANGE UP (ref 135–145)
SP GR SPEC: 1.03 — HIGH (ref 1.01–1.02)
UROBILINOGEN FLD QL: NEGATIVE — SIGNIFICANT CHANGE UP
WBC # BLD: 21.5 K/UL — HIGH (ref 3.8–10.5)
WBC # FLD AUTO: 21.5 K/UL — HIGH (ref 3.8–10.5)
WBC UR QL: 5 /HPF — SIGNIFICANT CHANGE UP (ref 0–5)

## 2019-09-19 PROCEDURE — 71046 X-RAY EXAM CHEST 2 VIEWS: CPT | Mod: 26

## 2019-09-19 PROCEDURE — 99284 EMERGENCY DEPT VISIT MOD MDM: CPT | Mod: GC

## 2019-09-19 RX ORDER — IBUPROFEN 200 MG
600 TABLET ORAL ONCE
Refills: 0 | Status: COMPLETED | OUTPATIENT
Start: 2019-09-19 | End: 2019-09-19

## 2019-09-19 RX ORDER — ACETAMINOPHEN 500 MG
650 TABLET ORAL EVERY 6 HOURS
Refills: 0 | Status: DISCONTINUED | OUTPATIENT
Start: 2019-09-19 | End: 2019-09-27

## 2019-09-19 RX ORDER — SODIUM CHLORIDE 9 MG/ML
1000 INJECTION, SOLUTION INTRAVENOUS
Refills: 0 | Status: DISCONTINUED | OUTPATIENT
Start: 2019-09-19 | End: 2019-09-22

## 2019-09-19 RX ORDER — CEFTRIAXONE 500 MG/1
1000 INJECTION, POWDER, FOR SOLUTION INTRAMUSCULAR; INTRAVENOUS EVERY 24 HOURS
Refills: 0 | Status: DISCONTINUED | OUTPATIENT
Start: 2019-09-19 | End: 2019-09-20

## 2019-09-19 RX ORDER — VALSARTAN 80 MG/1
160 TABLET ORAL DAILY
Refills: 0 | Status: DISCONTINUED | OUTPATIENT
Start: 2019-09-19 | End: 2019-09-27

## 2019-09-19 RX ORDER — CEFTRIAXONE 500 MG/1
1000 INJECTION, POWDER, FOR SOLUTION INTRAMUSCULAR; INTRAVENOUS ONCE
Refills: 0 | Status: COMPLETED | OUTPATIENT
Start: 2019-09-19 | End: 2019-09-19

## 2019-09-19 RX ORDER — SENNA PLUS 8.6 MG/1
2 TABLET ORAL AT BEDTIME
Refills: 0 | Status: DISCONTINUED | OUTPATIENT
Start: 2019-09-19 | End: 2019-09-27

## 2019-09-19 RX ORDER — ALLOPURINOL 300 MG
300 TABLET ORAL DAILY
Refills: 0 | Status: DISCONTINUED | OUTPATIENT
Start: 2019-09-19 | End: 2019-09-27

## 2019-09-19 RX ORDER — CYCLOBENZAPRINE HYDROCHLORIDE 10 MG/1
10 TABLET, FILM COATED ORAL THREE TIMES A DAY
Refills: 0 | Status: DISCONTINUED | OUTPATIENT
Start: 2019-09-19 | End: 2019-09-27

## 2019-09-19 RX ORDER — ASPIRIN/CALCIUM CARB/MAGNESIUM 324 MG
81 TABLET ORAL DAILY
Refills: 0 | Status: DISCONTINUED | OUTPATIENT
Start: 2019-09-19 | End: 2019-09-27

## 2019-09-19 RX ORDER — ATORVASTATIN CALCIUM 80 MG/1
20 TABLET, FILM COATED ORAL AT BEDTIME
Refills: 0 | Status: DISCONTINUED | OUTPATIENT
Start: 2019-09-19 | End: 2019-09-27

## 2019-09-19 RX ORDER — SODIUM CHLORIDE 9 MG/ML
2800 INJECTION, SOLUTION INTRAVENOUS ONCE
Refills: 0 | Status: COMPLETED | OUTPATIENT
Start: 2019-09-19 | End: 2019-09-19

## 2019-09-19 RX ADMIN — CEFTRIAXONE 100 MILLIGRAM(S): 500 INJECTION, POWDER, FOR SOLUTION INTRAMUSCULAR; INTRAVENOUS at 21:40

## 2019-09-19 RX ADMIN — Medication 600 MILLIGRAM(S): at 23:29

## 2019-09-19 RX ADMIN — SODIUM CHLORIDE 2800 MILLILITER(S): 9 INJECTION, SOLUTION INTRAVENOUS at 21:40

## 2019-09-19 RX ADMIN — Medication 600 MILLIGRAM(S): at 21:56

## 2019-09-19 RX ADMIN — SODIUM CHLORIDE 2800 MILLILITER(S): 9 INJECTION, SOLUTION INTRAVENOUS at 23:11

## 2019-09-19 RX ADMIN — CEFTRIAXONE 1000 MILLIGRAM(S): 500 INJECTION, POWDER, FOR SOLUTION INTRAMUSCULAR; INTRAVENOUS at 23:11

## 2019-09-19 NOTE — ED PROVIDER NOTE - PHYSICAL EXAMINATION
Attn - pt is alert and NAD.  skin is hot and dry. lungs- clear, cor - tachycardia, abdo - soft, NT, ND, no CVAT, Ext. - , neuro - grossly intact.

## 2019-09-19 NOTE — ED ADULT NURSE NOTE - OBJECTIVE STATEMENT
69M to ED s/p prostate biopsy completed two days ago, pt states chills, did not have measurable fever at home PTA, blood in urine, pain with urination, increased urgency/frequency and decreased amount of urine with each void, and headache. Pt states also recent platelet transfusion. Pt was given Cipro after the biopsy. Pt is alert and oriented x4, calm/cooperative, NAD, VSS. Pt is ambulatory without assistance in ED. Pt has 20 ga to LAC. Pt denies chest pain, SOB, dizzy.

## 2019-09-19 NOTE — H&P ADULT - NSHPLABSRESULTS_GEN_ALL_CORE
Lab Results:  CBC  CBC Full  -  ( 19 Sep 2019 21:51 )  WBC Count : 21.5 K/uL  RBC Count : 4.59 M/uL  Hemoglobin : 13.0 g/dL  Hematocrit : 41.0 %  Platelet Count - Automated : 342 K/uL  Mean Cell Volume : 89.3 fl  Mean Cell Hemoglobin : 28.4 pg  Mean Cell Hemoglobin Concentration : 31.8 gm/dL  Auto Neutrophil # : x  Auto Lymphocyte # : x  Auto Monocyte # : x  Auto Eosinophil # : x  Auto Basophil # : x  Auto Neutrophil % : x  Auto Lymphocyte % : x  Auto Monocyte % : x  Auto Eosinophil % : x  Auto Basophil % : x    .		Differential:	[] Automated		[] Manual  Chemistry      140  |  100  |  16  ----------------------------<  118<H>  3.6   |  23  |  0.89    Ca    9.8      19 Sep 2019 21:51    TPro  7.2  /  Alb  4.8  /  TBili  1.4<H>  /  DBili  x   /  AST  41<H>  /  ALT  48<H>  /  AlkPhos  103      LIVER FUNCTIONS - ( 19 Sep 2019 21:51 )  Alb: 4.8 g/dL / Pro: 7.2 g/dL / ALK PHOS: 103 U/L / ALT: 48 U/L / AST: 41 U/L / GGT: x             Urinalysis Basic - ( 19 Sep 2019 21:49 )    Color: Yellow / Appearance: Clear / S.029 / pH: x  Gluc: x / Ketone: Negative  / Bili: Negative / Urobili: Negative   Blood: x / Protein: 100 mg/dL / Nitrite: Negative   Leuk Esterase: Small / RBC: 4 /hpf / WBC 5 /HPF   Sq Epi: x / Non Sq Epi: 0 / Bacteria: Negative        MICROBIOLOGY/CULTURES:    RADIOLOGY RESULTS:

## 2019-09-19 NOTE — ED PROVIDER NOTE - PMH
Anemia  on slow iron  Back pain  patient has had three lumbar epidural steroid injection  Cholesterol serum increased    GERD (gastroesophageal reflux disease)    Hearing loss  right ear hearing los second to acoustic neuroma surgery with some decreased hearing in left ear also  Herniated disc  L2-3 withlower bulge  Hypertension    Pain Disorder  severe back pain lumbar with history of lumbar epidural steroid injections  Rectal bleed  bleeding hemmoroids  Spinal stenosis

## 2019-09-19 NOTE — H&P ADULT - NSHPPHYSICALEXAM_GEN_ALL_CORE
General: Not in acute distress   Skin: warm, no cyanosis   Heart: RRR, S1 and S2 normal   Lung: CTA BL   Abdo: Soft, Non-tender, non-distended,    Back: No CVAT B/L   Extremity: No calf tenderness, No edema   : voiding free  Neuro: A&O x3

## 2019-09-19 NOTE — H&P ADULT - ATTENDING COMMENTS
Agree with history  Awaiting cultures and ID consult. Presently continue with Ceftriaxone until seen by ID

## 2019-09-19 NOTE — H&P ADULT - HISTORY OF PRESENT ILLNESS
pt 68 y/o M PMhx of myeloproliferative disorder. came to the ED c/o fever s/p prostate bx on 9/17 w/ Dr. Roman. post biopsy pt feel well until this afternoon when pt began to have dysuria, chills/rigors, myalgia and mild headache. still has mild rectum pain.  He denies fever, abdo pain, n/v.  Pt states he often does not get fever with infections.  Pt had sinusitis recently and was taking Augmentin. Pt was given Cipro BID after the biopsy and took last dose this am.  Pt took extra strength Tylenol at 5:45pm today.  He called Dr. Kenji Roman who discussed situation and sent pt to ED

## 2019-09-19 NOTE — H&P ADULT - NSICDXPASTMEDICALHX_GEN_ALL_CORE_FT
PAST MEDICAL HISTORY:  Anemia on slow iron    Back pain patient has had three lumbar epidural steroid injection    Cholesterol serum increased     GERD (gastroesophageal reflux disease)     Hearing loss right ear hearing los second to acoustic neuroma surgery with some decreased hearing in left ear also    Herniated disc L2-3 withlower bulge    Hypertension     Pain Disorder severe back pain lumbar with history of lumbar epidural steroid injections    Rectal bleed bleeding hemmoroids    Spinal stenosis

## 2019-09-19 NOTE — ED ADULT TRIAGE NOTE - CHIEF COMPLAINT QUOTE
prostate bx done on Tuesday; fever (95 degrees orally at home) and chills today; on cipro for sinus infection; hx essential thrombocytosis and secondary myelofibrosis

## 2019-09-19 NOTE — ED PROVIDER NOTE - PROGRESS NOTE DETAILS
[facility] PGY1 - Spoke to urology PA, who states pt. will be admitted to Kenji Roman MD.  Pt. is aware and agreeable to this plan.  All other questions have been answered.

## 2019-09-19 NOTE — ED ADULT NURSE NOTE - NSFALLRSKASSESSDT_ED_ALL_ED
BEHAVIORAL HEALTH PROGRESS NOTE    Date: 11/16/2017    Attending: Chay Graham MD     Problem List:   Active Problems:    * No active hospital problems. *      Subjective: Dionicio Holman is a 28 year old male who was admitted on 11/16/2017 for depression anxiety insomnia    Overall reports doing fairly well denies having anxious. Couple days kilos hard for him to move otherwise feels meds are helping sleep is back regulated he's been able to get up to 10 program he's not suicidal homicidal still some residual symptoms but feels meds are helping as well as programming denies safety issues.         ALLERGIES, SOCIAL AND FAMILY HISTORY REVIEWED    Scheduled Medications:        Objective:   Vital 24 Hour Range Most Recent Value   Temperature No Data Recorded     Pulse No Data Recorded     Respiratory No Data Recorded     Blood Pressure No Data Recorded     Pulse Oximetry No Data Recorded    O2 No Data Recorded      Vital Most Recent Value First Value   Weight       Height           Mental Status Exam:  APPEARANCE: Appropriately dressed  GROOMING: Normally groomed  ATTITUDE:  Appropriate  PSYCHOMOTOR STATE: Normal psychomotor activity  ABNORMAL MOVEMENTS OR POSTURE: Normal movements and posture  EYE CONTACT:  Appropriate  SPEECH: Normal rate and rhythm   MOOD: Euthymic mild residual anxiety  AFFECT: Mildly restricted  THOUGHT PROCESS:  Logical without any formal thought disorder  THOUGHT CONTENT: Normal thought content without delusions hallucinations or perceptual disturbances  PERCEPTION: Normal   CONSCIOUSNESS:  Normal level of consciousness  ORIENTATION:  Oriented x3  MEMORY:  IMMEDIATE: Intact                        RECENT: Intact                      REMOTE: Intact  ATTENTION:  Normal attention span  CONCENTRATION: normal  INSIGHT:  Intact  JUDGEMENT:  Appropriate judgment  SUICIDAL IDEATIONS: No suicidal ideation  HOMICIDAL IDEATIONS: No homicidal ideation      ASSESSMENT:    Axis I: Major depression  anxiety NOS insomnia    Axis II: Deferred    Axis: III: None active    Axis IV: Mild to moderate    Axis V: current GAF: 55, Previous GAF: 45        PLAN  Overall doing well continue a stroke continue current in the dosage supportive therapy continue PHP to further stabilization    Patient was seen for 30 minutes visit; 20 minutes for supportive psychotherapy and 10 minutes for evaluation and management of medications.    Chay Graham MD  11/16/2017  10:40 AM   19-Sep-2019 23:04

## 2019-09-19 NOTE — ED PROVIDER NOTE - CLINICAL SUMMARY MEDICAL DECISION MAKING FREE TEXT BOX
Attn - pt with prostate bx two days ago, now with chills/rigors and dysuria.  pt also developed cough this PM - likely due to prostate bx, despite pt on Augmentin and Cipro.   consult., labs, ua, cultures, IV Abx.  Admit

## 2019-09-19 NOTE — H&P ADULT - NSICDXPASTSURGICALHX_GEN_ALL_CORE_FT
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

## 2019-09-19 NOTE — ED PROVIDER NOTE - OBJECTIVE STATEMENT
Attn - pt seen in [facility].  pt had prostate biopsy on afternoon of 9/17 for elevated PSA (pt rec'd platelet transfusion in am for thromocytosis) and was well until this afternoon when pt began to have dysuria, chills/rigors, myalgia and mild headache.  He denies fever, abdo pain, n/v.  Pt states he often does not get fever with infections.  Pt had sinusitis recently and was taking Augmentin. Pt was given Cipro BID after the biopsy and took last dose this am.  Pt took extra strength Tylenol at 5:45pm today.  He called his Urologist - Dr. Kenji Edwards who discussed situation with pt's hematologist - Dr. Noam Coker - and advised pt to go to ER to be admitted.

## 2019-09-19 NOTE — ED PROVIDER NOTE - PSH
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  Esophagus disorder  removal of cartilage ring aroud esophagus 2/03  Knee gives way  left knee arthroscopy 11/02  Knee gives way  right knee arthroscopy 1/26/01  Spinal fluid abnorm  CSF leak second to repair of acoustic neuroma had repair right Labyrinthectomy 7/17/98  Vasectomy status  S/P 12/00

## 2019-09-19 NOTE — H&P ADULT - ASSESSMENT
pt 68 y/o M PMhx of myeloproliferative disorder. came to the ED c/o fever s/p prostate bx on 9/17 w/ Dr. Roman. post biopsy pt feel well until this afternoon when pt began to have dysuria, chills/rigors, myalgia and mild headache. still has mild rectum pain.  He denies fever, abdo pain, n/v.  Pt states he often does not get fever with infections.  Pt had sinusitis recently and was taking Augmentin. Pt was given Cipro BID after the biopsy and took last dose this am.  Pt took extra strength Tylenol at 5:45pm today.  He called Dr. Kenji Roman who discussed situation and sent pt to ED. Fever 102 at ED. elevated WBC. PVR 30cc       plan  - admitted to   - cont. IV ABx  -  f/u BCx and UCx  - consult ID and hema

## 2019-09-20 LAB
-  K. PNEUMONIAE GROUP: SIGNIFICANT CHANGE UP
ANION GAP SERPL CALC-SCNC: 14 MMOL/L — SIGNIFICANT CHANGE UP (ref 5–17)
ANISOCYTOSIS BLD QL: SLIGHT — SIGNIFICANT CHANGE UP
BASOPHILS # BLD AUTO: 0 K/UL — SIGNIFICANT CHANGE UP (ref 0–0.2)
BASOPHILS NFR BLD AUTO: 0 % — SIGNIFICANT CHANGE UP (ref 0–2)
BUN SERPL-MCNC: 12 MG/DL — SIGNIFICANT CHANGE UP (ref 7–23)
CALCIUM SERPL-MCNC: 9 MG/DL — SIGNIFICANT CHANGE UP (ref 8.4–10.5)
CHLORIDE SERPL-SCNC: 104 MMOL/L — SIGNIFICANT CHANGE UP (ref 96–108)
CO2 SERPL-SCNC: 26 MMOL/L — SIGNIFICANT CHANGE UP (ref 22–31)
CREAT SERPL-MCNC: 0.83 MG/DL — SIGNIFICANT CHANGE UP (ref 0.5–1.3)
DACRYOCYTES BLD QL SMEAR: SLIGHT — SIGNIFICANT CHANGE UP
EOSINOPHIL # BLD AUTO: 0 K/UL — SIGNIFICANT CHANGE UP (ref 0–0.5)
EOSINOPHIL NFR BLD AUTO: 0 % — SIGNIFICANT CHANGE UP (ref 0–6)
GLUCOSE SERPL-MCNC: 116 MG/DL — HIGH (ref 70–99)
GRAM STN FLD: SIGNIFICANT CHANGE UP
HCT VFR BLD CALC: 37.3 % — LOW (ref 39–50)
HCV AB S/CO SERPL IA: 0.15 S/CO — SIGNIFICANT CHANGE UP (ref 0–0.99)
HCV AB SERPL-IMP: SIGNIFICANT CHANGE UP
HGB BLD-MCNC: 11.8 G/DL — LOW (ref 13–17)
LYMPHOCYTES # BLD AUTO: 0.19 K/UL — LOW (ref 1–3.3)
LYMPHOCYTES # BLD AUTO: 0.9 % — LOW (ref 13–44)
MACROCYTES BLD QL: SLIGHT — SIGNIFICANT CHANGE UP
MANUAL SMEAR VERIFICATION: SIGNIFICANT CHANGE UP
MCHC RBC-ENTMCNC: 28.9 PG — SIGNIFICANT CHANGE UP (ref 27–34)
MCHC RBC-ENTMCNC: 31.6 GM/DL — LOW (ref 32–36)
MCV RBC AUTO: 91.4 FL — SIGNIFICANT CHANGE UP (ref 80–100)
METHOD TYPE: SIGNIFICANT CHANGE UP
MICROCYTES BLD QL: SLIGHT — SIGNIFICANT CHANGE UP
MONOCYTES # BLD AUTO: 7.14 K/UL — HIGH (ref 0–0.9)
MONOCYTES NFR BLD AUTO: 34.5 % — HIGH (ref 2–14)
MYELOCYTES NFR BLD: 0.9 % — HIGH (ref 0–0)
NEUTROPHILS # BLD AUTO: 12.99 K/UL — HIGH (ref 1.8–7.4)
NEUTROPHILS NFR BLD AUTO: 56.6 % — SIGNIFICANT CHANGE UP (ref 43–77)
NEUTS BAND # BLD: 6.2 % — SIGNIFICANT CHANGE UP (ref 0–8)
NRBC # BLD: 4 /100 — HIGH (ref 0–0)
OVALOCYTES BLD QL SMEAR: SIGNIFICANT CHANGE UP
PLAT MORPH BLD: NORMAL — SIGNIFICANT CHANGE UP
PLATELET # BLD AUTO: 293 K/UL — SIGNIFICANT CHANGE UP (ref 150–400)
POIKILOCYTOSIS BLD QL AUTO: SIGNIFICANT CHANGE UP
POLYCHROMASIA BLD QL SMEAR: SLIGHT — SIGNIFICANT CHANGE UP
POTASSIUM SERPL-MCNC: 3.7 MMOL/L — SIGNIFICANT CHANGE UP (ref 3.5–5.3)
POTASSIUM SERPL-SCNC: 3.7 MMOL/L — SIGNIFICANT CHANGE UP (ref 3.5–5.3)
RBC # BLD: 4.08 M/UL — LOW (ref 4.2–5.8)
RBC # FLD: 15.4 % — HIGH (ref 10.3–14.5)
RBC BLD AUTO: ABNORMAL
SCHISTOCYTES BLD QL AUTO: SLIGHT — SIGNIFICANT CHANGE UP
SODIUM SERPL-SCNC: 144 MMOL/L — SIGNIFICANT CHANGE UP (ref 135–145)
SPECIMEN SOURCE: SIGNIFICANT CHANGE UP
SPECIMEN SOURCE: SIGNIFICANT CHANGE UP
VARIANT LYMPHS # BLD: 0.9 % — SIGNIFICANT CHANGE UP (ref 0–6)
WBC # BLD: 20.69 K/UL — HIGH (ref 3.8–10.5)
WBC # FLD AUTO: 20.69 K/UL — HIGH (ref 3.8–10.5)

## 2019-09-20 PROCEDURE — 99222 1ST HOSP IP/OBS MODERATE 55: CPT | Mod: GC

## 2019-09-20 PROCEDURE — 99223 1ST HOSP IP/OBS HIGH 75: CPT | Mod: GC

## 2019-09-20 RX ORDER — INFLUENZA VIRUS VACCINE 15; 15; 15; 15 UG/.5ML; UG/.5ML; UG/.5ML; UG/.5ML
0.5 SUSPENSION INTRAMUSCULAR ONCE
Refills: 0 | Status: COMPLETED | OUTPATIENT
Start: 2019-09-20 | End: 2019-09-20

## 2019-09-20 RX ORDER — PHENAZOPYRIDINE HCL 100 MG
200 TABLET ORAL EVERY 8 HOURS
Refills: 0 | Status: COMPLETED | OUTPATIENT
Start: 2019-09-20 | End: 2019-09-22

## 2019-09-20 RX ORDER — TAMSULOSIN HYDROCHLORIDE 0.4 MG/1
0.4 CAPSULE ORAL EVERY 24 HOURS
Refills: 0 | Status: DISCONTINUED | OUTPATIENT
Start: 2019-09-20 | End: 2019-09-27

## 2019-09-20 RX ORDER — PIPERACILLIN AND TAZOBACTAM 4; .5 G/20ML; G/20ML
3.38 INJECTION, POWDER, LYOPHILIZED, FOR SOLUTION INTRAVENOUS EVERY 8 HOURS
Refills: 0 | Status: DISCONTINUED | OUTPATIENT
Start: 2019-09-20 | End: 2019-09-22

## 2019-09-20 RX ORDER — IBUPROFEN 200 MG
600 TABLET ORAL EVERY 8 HOURS
Refills: 0 | Status: DISCONTINUED | OUTPATIENT
Start: 2019-09-20 | End: 2019-09-27

## 2019-09-20 RX ORDER — PIPERACILLIN AND TAZOBACTAM 4; .5 G/20ML; G/20ML
3.38 INJECTION, POWDER, LYOPHILIZED, FOR SOLUTION INTRAVENOUS ONCE
Refills: 0 | Status: COMPLETED | OUTPATIENT
Start: 2019-09-20 | End: 2019-09-20

## 2019-09-20 RX ORDER — SODIUM CHLORIDE 9 MG/ML
1000 INJECTION, SOLUTION INTRAVENOUS ONCE
Refills: 0 | Status: COMPLETED | OUTPATIENT
Start: 2019-09-20 | End: 2019-09-20

## 2019-09-20 RX ORDER — HEPARIN SODIUM 5000 [USP'U]/ML
5000 INJECTION INTRAVENOUS; SUBCUTANEOUS EVERY 8 HOURS
Refills: 0 | Status: DISCONTINUED | OUTPATIENT
Start: 2019-09-20 | End: 2019-09-27

## 2019-09-20 RX ADMIN — Medication 650 MILLIGRAM(S): at 15:30

## 2019-09-20 RX ADMIN — Medication 650 MILLIGRAM(S): at 21:37

## 2019-09-20 RX ADMIN — Medication 650 MILLIGRAM(S): at 15:01

## 2019-09-20 RX ADMIN — ATORVASTATIN CALCIUM 20 MILLIGRAM(S): 80 TABLET, FILM COATED ORAL at 07:03

## 2019-09-20 RX ADMIN — PIPERACILLIN AND TAZOBACTAM 25 GRAM(S): 4; .5 INJECTION, POWDER, LYOPHILIZED, FOR SOLUTION INTRAVENOUS at 21:37

## 2019-09-20 RX ADMIN — Medication 650 MILLIGRAM(S): at 06:49

## 2019-09-20 RX ADMIN — Medication 200 MILLIGRAM(S): at 15:31

## 2019-09-20 RX ADMIN — PIPERACILLIN AND TAZOBACTAM 200 GRAM(S): 4; .5 INJECTION, POWDER, LYOPHILIZED, FOR SOLUTION INTRAVENOUS at 13:51

## 2019-09-20 RX ADMIN — Medication 600 MILLIGRAM(S): at 18:33

## 2019-09-20 RX ADMIN — Medication 81 MILLIGRAM(S): at 07:03

## 2019-09-20 RX ADMIN — VALSARTAN 160 MILLIGRAM(S): 80 TABLET ORAL at 05:39

## 2019-09-20 RX ADMIN — HEPARIN SODIUM 5000 UNIT(S): 5000 INJECTION INTRAVENOUS; SUBCUTANEOUS at 21:37

## 2019-09-20 RX ADMIN — Medication 600 MILLIGRAM(S): at 18:03

## 2019-09-20 RX ADMIN — SODIUM CHLORIDE 75 MILLILITER(S): 9 INJECTION, SOLUTION INTRAVENOUS at 03:56

## 2019-09-20 RX ADMIN — SODIUM CHLORIDE 75 MILLILITER(S): 9 INJECTION, SOLUTION INTRAVENOUS at 08:30

## 2019-09-20 RX ADMIN — Medication 650 MILLIGRAM(S): at 22:07

## 2019-09-20 RX ADMIN — Medication 200 MILLIGRAM(S): at 21:37

## 2019-09-20 RX ADMIN — Medication 200 MILLIGRAM(S): at 14:01

## 2019-09-20 RX ADMIN — Medication 300 MILLIGRAM(S): at 07:03

## 2019-09-20 RX ADMIN — TAMSULOSIN HYDROCHLORIDE 0.4 MILLIGRAM(S): 0.4 CAPSULE ORAL at 08:29

## 2019-09-20 RX ADMIN — Medication 650 MILLIGRAM(S): at 08:11

## 2019-09-20 RX ADMIN — SODIUM CHLORIDE 1000 MILLILITER(S): 9 INJECTION, SOLUTION INTRAVENOUS at 06:49

## 2019-09-20 NOTE — CONSULT NOTE ADULT - SUBJECTIVE AND OBJECTIVE BOX
Patient is a 69y old  Male who presents with a chief complaint of fever s/p prostate biopsy (20 Sep 2019 08:32)    HPI:  pt 68 y/o M PMhx of myeloproliferative disorder. came to the ED c/o fever s/p prostate bx on  w/ Dr. Roman. post biopsy pt feel well until this afternoon when pt began to have dysuria, chills/rigors, myalgia and mild headache. still has mild rectum pain.  He denies fever, abdo pain, n/v.  Pt states he often does not get fever with infections.  Pt had sinusitis recently and was taking Augmentin. Pt was given Cipro BID after the biopsy and took last dose this am.  Pt took extra strength Tylenol at 5:45pm today.  He called Dr. Kenji Roman who discussed situation and sent pt to ED (19 Sep 2019 22:48)     prior hospital charts reviewed [  ]  primary team notes reviewed [  ]  other consultant notes reviewed [  ]  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 with some decreased hearing in left ear also  Herniated disc: L2-3 withlower bulge  Spinal stenosis  Anemia: on slow iron  Cholesterol serum increased  GERD (gastroesophageal reflux disease)  Hypertension  Rectal bleed: bleeding hemmoroids  Esophagus disorder: removal of cartilage ring aroud esophagus   Vasectomy status: S/P   Knee gives way: left knee arthroscopy   Knee gives way: right knee arthroscopy 01  Spinal fluid abnorm: CSF leak second to repair of acoustic neuroma had repair right Labyrinthectomy 98  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    Allergies  codeine (Pruritus; Other)    ANTIMICROBIALS (past 90 days)  MEDICATIONS  (STANDING):  cefTRIAXone   IVPB   100 mL/Hr IV Intermittent (19 @ 21:40)      ANTIMICROBIALS:    cefTRIAXone   IVPB 1000 every 24 hours    OTHER MEDS: MEDICATIONS  (STANDING):  acetaminophen   Tablet .. 650 every 6 hours PRN  allopurinol 300 daily  aspirin enteric coated 81 daily  atorvastatin 20 at bedtime  cyclobenzaprine 10 three times a day PRN  heparin  Injectable 5000 every 8 hours  phenazopyridine 200 every 8 hours  senna 2 at bedtime PRN  tamsulosin 0.4 every 24 hours  valsartan 160 daily    SOCIAL HISTORY:   hx smoking  non-smoker    FAMILY HISTORY:    REVIEW OF SYSTEMS  [  ] ROS unobtainable because:    [  ] All other systems negative except as noted below:	    Constitutional:  [ ] fever [ ] chills  [ ] weight loss  [ ] weakness  Skin:  [ ] rash [ ] phlebitis	  Eyes: [ ] icterus [ ] pain  [ ] discharge	  ENMT: [ ] sore throat  [ ] thrush [ ] ulcers [ ] exudates  Respiratory: [ ] dyspnea [ ] hemoptysis [ ] cough [ ] sputum	  Cardiovascular:  [ ] chest pain [ ] palpitations [ ] edema	  Gastrointestinal:  [ ] nausea [ ] vomiting [ ] diarrhea [ ] constipation [ ] pain	  Genitourinary:  [ ] dysuria [ ] frequency [ ] hematuria [ ] discharge [ ] flank pain  [ ] incontinence  Musculoskeletal:  [ ] myalgias [ ] arthralgias [ ] arthritis  [ ] back pain  Neurological:  [ ] headache [ ] seizures  [ ] confusion/altered mental status  Psychiatric:  [ ] anxiety [ ] depression	  Hematology/Lymphatics:  [ ] lymphadenopathy  Endocrine:  [ ] adrenal [ ] thyroid  Allergic/Immunologic:	 [ ] transplant [ ] seasonal    Vital Signs Last 24 Hrs  T(F): 101.3 (19 @ 06:45), Max: 102.2 (19 @ 21:55)  Vital Signs Last 24 Hrs  HR: 119 (19 @ 06:45) (96 - 119)  BP: 121/66 (19 @ 06:45) (104/64 - 166/89)  RR: 20 (19 @ 06:45)  SpO2: 93% (19 @ 06:45) (93% - 98%)  Wt(kg): --    EXAM:  Constitutional: Not in acute distress  Eyes: pupils bilaterally reactive to light. No icterus.  Oral cavity: Clear, no lesions  Neck: No neck vein distension noted  RS: Chest clear to auscultation bilaterally. No wheeze/rhonchi/crepitations.  CVS: S1, S2 heard. Regular rate and rhythm. No murmurs/rubs/gallops.  Abdomen: Soft. No guarding/rigidity/tenderness.  : No acute abnormalities  Extremities: Warm. No pedal edema  Skin: No lesions noted  Vascular: No evidence of phlebitis  Neuro: Alert, oriented to time/place/person  Cranial nerves 2-12 grossly normal. No focal abnormalities                          11.8   20.69 )-----------( 293      ( 20 Sep 2019 09:00 )             37.3     09-20    144  |  104  |  12  ----------------------------<  116<H>  3.7   |  26  |  0.83    Ca    9.0      20 Sep 2019 05:06    TPro  7.2  /  Alb  4.8  /  TBili  1.4<H>  /  DBili  x   /  AST  41<H>  /  ALT  48<H>  /  AlkPhos  103  -    Urinalysis Basic - ( 19 Sep 2019 21:49 )    Color: Yellow / Appearance: Clear / S.029 / pH: x  Gluc: x / Ketone: Negative  / Bili: Negative / Urobili: Negative   Blood: x / Protein: 100 mg/dL / Nitrite: Negative   Leuk Esterase: Small / RBC: 4 /hpf / WBC 5 /HPF   Sq Epi: x / Non Sq Epi: 0 / Bacteria: Negative    MICROBIOLOGY:                RADIOLOGY:  imaging below personally reviewed    OTHER TESTS: Patient is a 69y old  Male who presents with a chief complaint of fever s/p prostate biopsy (20 Sep 2019 08:32)    HPI:  pt 70 y/o M PMhx of myeloproliferative disorder. came to the ED c/o fever s/p prostate bx on  w/ Dr. Roman. post biopsy pt feel well until this afternoon when pt began to have dysuria, chills/rigors, myalgia and mild headache. still has mild rectum pain.  He denies fever, abdo pain, n/v.  Pt states he often does not get fever with infections.  Pt had sinusitis recently and was taking Augmentin. Pt was given Cipro BID after the biopsy and took last dose this am.  Pt took extra strength Tylenol at 5:45pm today.  He called Dr. Kenji Roman who discussed situation and sent pt to ED (19 Sep 2019 22:48)  -----------  - ID team consulted for antibiotic recommendations  - pt had visit [facility] for sinusitis and URI, received course of Augmentin BID  - had recent colonoscopy in  that showed pandiverticulosis, non-bleeding hemorrhoids, ulcer in terminal ileum  - reports constipation followed by multiple episodes of loose stools (non-bloody, non-foul smelling)  - Denied night sweats, rash, cough, coryza, loose stools, recent travel, sick contacts    prior hospital charts reviewed [x]  primary team notes reviewed [x]  other consultant notes reviewed [x]    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 with some decreased hearing in left ear also  Herniated disc: L2-3 withlower bulge  Spinal stenosis  Anemia: on slow iron  Cholesterol serum increased  GERD (gastroesophageal reflux disease)  Hypertension  Rectal bleed: bleeding hemmoroids  Esophagus disorder: removal of cartilage ring aroud esophagus   Vasectomy status: S/P   Knee gives way: left knee arthroscopy   Knee gives way: right knee arthroscopy 01  Spinal fluid abnorm: CSF leak second to repair of acoustic neuroma had repair right Labyrinthectomy 98  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    Allergies  codeine (Pruritus; Other)    ANTIMICROBIALS (past 90 days)  MEDICATIONS  (STANDING):  cefTRIAXone   IVPB   100 mL/Hr IV Intermittent (19 @ 21:40)      ANTIMICROBIALS:    cefTRIAXone   IVPB 1000 every 24 hours    OTHER MEDS: MEDICATIONS  (STANDING):  acetaminophen   Tablet .. 650 every 6 hours PRN  allopurinol 300 daily  aspirin enteric coated 81 daily  atorvastatin 20 at bedtime  cyclobenzaprine 10 three times a day PRN  heparin  Injectable 5000 every 8 hours  phenazopyridine 200 every 8 hours  senna 2 at bedtime PRN  tamsulosin 0.4 every 24 hours  valsartan 160 daily    SOCIAL HISTORY:  - lives with wife. Has been around daughter's cat and cleaning their litter  - denied smoking. occasional alcohol use and marijuana use  - works as medical malpractice   - sexually active only with wife    FAMILY HISTORY: Not contributory    REVIEW OF SYSTEMS  [  ] ROS unobtainable because:    [x] All other systems negative except as noted below:	    Constitutional:  [x] fever [x] chills  [ ] weight loss  [x] weakness  Skin:  [ ] rash [ ] phlebitis	  Eyes: [ ] icterus [ ] pain  [ ] discharge	  ENMT: [ ] sore throat  [ ] thrush [ ] ulcers [ ] exudates  Respiratory: [ ] dyspnea [ ] hemoptysis [ ] cough [ ] sputum	  Cardiovascular:  [ ] chest pain [ ] palpitations [ ] edema	  Gastrointestinal:  [ ] nausea [ ] vomiting [ ] diarrhea [ ] constipation [ ] pain	  Genitourinary:  [x] dysuria [x] frequency [ ] hematuria [ ] discharge [ ] flank pain  [ ] incontinence  Musculoskeletal:  [x] myalgias [ ] arthralgias [ ] arthritis  [ ] back pain  Neurological:  [ ] headache [ ] seizures  [ ] confusion/altered mental status  Psychiatric:  [ ] anxiety [ ] depression	  Hematology/Lymphatics:  [ ] lymphadenopathy  Endocrine:  [ ] adrenal [ ] thyroid  Allergic/Immunologic:	 [ ] transplant [ ] seasonal    Vital Signs Last 24 Hrs  T(F): 101.3 (19 @ 06:45), Max: 102.2 (19 @ 21:55)  Vital Signs Last 24 Hrs  HR: 119 (19 @ 06:45) (96 - 119)  BP: 121/66 (19 @ 06:45) (104/64 - 166/89)  RR: 20 (19 @ 06:45)  SpO2: 93% (19 @ 06:45) (93% - 98%)  Wt(kg): --    EXAM:  Constitutional: Not in acute distress  Eyes: pupils bilaterally reactive to light. No icterus.  Oral cavity: Clear, no lesions  Neck: No neck vein distension noted  RS: Chest clear to auscultation bilaterally. No wheeze/rhonchi/crepitations.  CVS: S1, S2 heard. Tachycardic. No murmurs/rubs/gallops.  Abdomen: Soft. No guarding/rigidity/tenderness.  : No acute abnormalities. Rectal exam revealed enlarged prostate, no tenderness.  Extremities: Warm. No pedal edema  Skin: No lesions noted  Vascular: No evidence of phlebitis  Neuro: Alert, oriented to time/place/person  Cranial nerves 2-12 grossly normal. No focal abnormalities                          11.8   20.69 )-----------( 293      ( 20 Sep 2019 09:00 )             37.3         144  |  104  |  12  ----------------------------<  116<H>  3.7   |  26  |  0.83    Ca    9.0      20 Sep 2019 05:06    TPro  7.2  /  Alb  4.8  /  TBili  1.4<H>  /  DBili  x   /  AST  41<H>  /  ALT  48<H>  /  AlkPhos  103      Urinalysis Basic - ( 19 Sep 2019 21:49 )    Color: Yellow / Appearance: Clear / S.029 / pH: x  Gluc: x / Ketone: Negative  / Bili: Negative / Urobili: Negative   Blood: x / Protein: 100 mg/dL / Nitrite: Negative   Leuk Esterase: Small / RBC: 4 /hpf / WBC 5 /HPF   Sq Epi: x / Non Sq Epi: 0 / Bacteria: Negative    MICROBIOLOGY:  Culture - Blood (19 @ 01:21)    Gram Stain:   Growth in aerobic bottle: Gram Negative Rods    Specimen Source: .Blood    Culture Results:   Growth in aerobic bottle: Gram Negative Rods  "Due to technical problems, Proteus sp. will Not be reported as part of  the BCID panel until further notice"  ***Blood Panel PCR results on this specimen are available  approximately 3 hours after the Gram stain result.***  Gram stain, PCR, and/or culture results may not always  correspond due to difference in methodologies.  ************************************************************  This PCR assay was performed using [employer].  The following targets are tested for: Enterococcus,  vancomycin resistant enterococci, Listeria monocytogenes,  coagulase negative staphylococci, S. aureus,  methicillin resistant S. aureus, Streptococcus agalactiae  (Group B), S. pneumoniae, S. pyogenes (Group A),  Acinetobacter baumannii, Enterobacter cloacae, E. coli,  Klebsiella oxytoca, K. pneumoniae, Proteus sp.,  Serratia marcescens, Haemophilus influenzae,  Neisseria meningitidis, Pseudomonas aeruginosa, Candida  albicans, C. glabrata, C krusei, C parapsilosis,  C. tropicalis and the KPC resistance gene.    Culture Results:   Growth in aerobic bottle: Gram Negative Rods  "Due to technical problems, Proteus sp. will Not be reported as part of  the BCID panel until further notice"  ***Blood Panel PCR results on this specimen are available  approximately 3 hours after the Gram stain result.***  Gram stain, PCR, and/or culture results may not always  correspond due to difference in methodologies.  ************************************************************  This PCR assay was performed using [employer].  The following targets are tested for: Enterococcus,  vancomycin resistant enterococci, Listeria monocytogenes,  coagulase negative staphylococci, S. aureus,  methicillin resistant S. aureus, Streptococcus agalactiae  (Group B), S. pneumoniae, S. pyogenes (Group A),  Acinetobacter baumannii, Enterobacter cloacae, E. coli,  Klebsiella oxytoca, K. pneumoniae, Proteus sp.,  Serratia marcescens, Haemophilus influenzae,  Neisseria meningitidis, Pseudomonas aeruginosa, Candida  albicans, C. glabrata, C krusei, C parapsilosis,  C. tropicalis and the KPC resistance gene. (19 @ 01:21)      RADIOLOGY:  imaging below personally reviewed  < from: Xray Chest 2 Views PA/Lat (19 @ 22:33) >  Clear lungs.    < end of copied text > Patient is a 69y old  Male who presents with a chief complaint of fever s/p prostate biopsy (20 Sep 2019 08:32)    HPI: 68 y/o M PMhx of myeloproliferative disorder. came to the ED c/o fever s/p prostate bx on  w/ Dr. Roman. post biopsy pt feel well until this afternoon when pt began to have dysuria, chills/rigors, myalgia and mild headache. still has mild rectum pain.  He denies fever, abdo pain, n/v.  Pt states he often does not get fever with infections.  Pt had sinusitis recently and was taking Augmentin. Pt was given Cipro BID after the biopsy and took last dose this am.  Pt took extra strength Tylenol at 5:45pm today.  He called Dr. Kenji Roman who discussed situation and sent pt to ED (19 Sep 2019 22:48)  -----------  - ID team consulted for antibiotic recommendations  - pt had visit to Urgent care 19 for sinusitis and URI, received course of Augmentin BID  - had recent colonoscopy in  that showed pandiverticulosis, non-bleeding hemorrhoids, ulcer in terminal ileum  - reports constipation followed by multiple episodes of loose stools (non-bloody, non-foul smelling)  - Denied night sweats, rash, cough, coryza, loose stools, recent travel, sick contacts    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 with some decreased hearing in left ear also  Herniated disc: L2-3 withlower bulge  Spinal stenosis  Anemia: on slow iron  Cholesterol serum increased  GERD (gastroesophageal reflux disease)  Hypertension  Rectal bleed: bleeding hemmoroids  Esophagus disorder: removal of cartilage ring aroud esophagus   Vasectomy status: S/P   Knee gives way: left knee arthroscopy   Knee gives way: right knee arthroscopy 01  Spinal fluid abnorm: CSF leak second to repair of acoustic neuroma had repair right Labyrinthectomy 98  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    Allergies  codeine (Pruritus; Other)    ANTIMICROBIALS:    cefTRIAXone   IVPB 1000 every 24 hours    OTHER MEDS: MEDICATIONS  (STANDING):  acetaminophen   Tablet .. 650 every 6 hours PRN  allopurinol 300 daily  aspirin enteric coated 81 daily  atorvastatin 20 at bedtime  cyclobenzaprine 10 three times a day PRN  heparin  Injectable 5000 every 8 hours  phenazopyridine 200 every 8 hours  senna 2 at bedtime PRN  tamsulosin 0.4 every 24 hours  valsartan 160 daily    SOCIAL HISTORY:  - lives with wife. Has been around daughter's cat and cleaning their litter  - denied smoking. occasional alcohol use and marijuana use  - works as medical malpractice   - sexually active only with wife    FAMILY HISTORY: Not contributory to bacteremia    REVIEW OF SYSTEMS  [  ] ROS unobtainable because:    [x] All other systems negative except as noted below:	    Constitutional:  [x] fever [x] chills  [ ] weight loss  [x] weakness  Skin:  [ ] rash [ ] phlebitis	  Eyes: [ ] icterus [ ] pain  [ ] discharge	  ENMT: [ ] sore throat  [ ] thrush [ ] ulcers [ ] exudates  Respiratory: [ ] dyspnea [ ] hemoptysis [ ] cough [ ] sputum	  Cardiovascular:  [ ] chest pain [ ] palpitations [ ] edema	  Gastrointestinal:  [ ] nausea [ ] vomiting [ ] diarrhea [ ] constipation [ ] pain	  Genitourinary:  [x] dysuria [x] frequency [ ] hematuria [ ] discharge [ ] flank pain  [ ] incontinence  Musculoskeletal:  [x] myalgias [ ] arthralgias [ ] arthritis  [ ] back pain  Neurological:  [ ] headache [ ] seizures  [ ] confusion/altered mental status  Psychiatric:  [ ] anxiety [ ] depression	  Hematology/Lymphatics:  [ ] lymphadenopathy  Endocrine:  [ ] adrenal [ ] thyroid  Allergic/Immunologic:	 [ ] transplant [ ] seasonal    Vital Signs Last 24 Hrs  T(F): 101.3 (19 @ 06:45), Max: 102.2 (19 @ 21:55)  Vital Signs Last 24 Hrs  HR: 119 (19 @ 06:45) (96 - 119)  BP: 121/66 (19 @ 06:45) (104/64 - 166/89)  RR: 20 (19 @ 06:45)  SpO2: 93% (19 @ 06:45) (93% - 98%)    EXAM:  Constitutional: Not in acute distress  Eyes: pupils bilaterally reactive to light. No icterus.  Oral cavity: Clear, no lesions  Neck: No neck vein distension noted  RS: Chest clear to auscultation bilaterally. No wheeze/rhonchi/crepitations.  CVS: S1, S2 heard. Tachycardic. No murmurs/rubs/gallops.  Abdomen: Soft. No guarding/rigidity/tenderness.  : No acute abnormalities. Rectal exam revealed enlarged prostate, no tenderness.  Extremities: Warm. No pedal edema  Skin: No lesions noted  Vascular: No evidence of phlebitis  Neuro: Alert, oriented to time/place/person  Cranial nerves 2-12 grossly normal. No focal abnormalities    Labs:                        11.8   20.69 )-----------( 293      ( 20 Sep 2019 09:00 )             37.3           144  |  104  |  12  ----------------------------<  116<H>  3.7   |  26  |  0.83    Ca    9.0      20 Sep 2019 05:06    TPro  7.2  /  Alb  4.8  /  TBili  1.4<H>  /  DBili  x   /  AST  41<H>  /  ALT  48<H>  /  AlkPhos  103      Urinalysis Basic - ( 19 Sep 2019 21:49 )    Color: Yellow / Appearance: Clear / S.029 / pH: x  Gluc: x / Ketone: Negative  / Bili: Negative / Urobili: Negative   Blood: x / Protein: 100 mg/dL / Nitrite: Negative   Leuk Esterase: Small / RBC: 4 /hpf / WBC 5 /HPF   Sq Epi: x / Non Sq Epi: 0 / Bacteria: Negative    MICROBIOLOGY:  Culture - Blood (19 @ 01:21)    Gram Stain:   Growth in aerobic bottle: Gram Negative Rods    Specimen Source: .Blood    Culture Results:   Growth in aerobic bottle: Gram Negative Rods    Culture Results:   Growth in aerobic bottle: Gram Negative Rods    RADIOLOGY:  imaging below personally reviewed  < from: Xray Chest 2 Views PA/Lat (19 @ 22:33) >  Clear lungs.

## 2019-09-20 NOTE — PROGRESS NOTE ADULT - SUBJECTIVE AND OBJECTIVE BOX
UROLOGY PROGRESS NOTE:     Subjective: Patient seen and examined at bedside.       Objective:  Vital signs  T(F): , Max: 102.2 (09-19-19 @ 21:55)  HR: 119 (09-20-19 @ 06:45)  BP: 121/66 (09-20-19 @ 06:45)  SpO2: 93% (09-20-19 @ 06:45)  Wt(kg): --    Output     I&O's Detail    19 Sep 2019 07:01  -  20 Sep 2019 07:00  --------------------------------------------------------  IN:  Total IN: 0 mL    OUT:    Voided: 1500 mL  Total OUT: 1500 mL    Total NET: -1500 mL      20 Sep 2019 07:01  -  20 Sep 2019 08:32  --------------------------------------------------------  IN:  Total IN: 0 mL    OUT:    Voided: 50 mL  Total OUT: 50 mL    Total NET: -50 mL          Physical Exam:  Gen: no acute distress  Back:  Abd:  :    Labs:                        13.0   21.5  )-----------( 342      ( 19 Sep 2019 21:51 )             41.0     09-20    144  |  104  |  12  ----------------------------<  116<H>  3.7   |  26  |  0.83    Ca    9.0      20 Sep 2019 05:06    TPro  7.2  /  Alb  4.8  /  TBili  1.4<H>  /  DBili  x   /  AST  41<H>  /  ALT  48<H>  /  AlkPhos  103  09-19

## 2019-09-20 NOTE — CONSULT NOTE ADULT - ASSESSMENT
ASSESSMENT:    RECOMMENDATIONS:    MARISELA Rivero, MD  Fellow, Infectious Diseases  Pager: [phone]  After 5pm and on Weekends: Call [phone] ASSESSMENT:  69/M with PMH myeloproliferative disorder and essential thrombocytosis, HTN/HLD, anemia. came to the ED c/o fever s/p prostate bx on 9/17 w/ Dr. Roman. A/w dysuria, chills/rigors, myalgia and mild headache. ID team consulted for antibiotic recommendations  --------------  Gram negative Bacteremia  - likely secondary to recent prostate biopsy (with  source)  - had recent colonoscopy in 7/19 that showed pandiverticulosis, non-bleeding hemorrhoids, ulcer in terminal ileum  - reports constipation followed by multiple episodes of loose stools (non-bloody, non-foul smelling)    RECOMMENDATIONS:  - discontinue Ceftriaxone  MARISELA Rivero, MD  Fellow, Infectious Diseases  Pager: [phone]  After 5pm and on Weekends: Call [phone] ASSESSMENT:  69/M with PMH myeloproliferative disorder and essential thrombocytosis, HTN/HLD, anemia. came to the ED c/o fever s/p prostate bx on 9/17 w/ Dr. Roman. A/w dysuria, chills/rigors, myalgia and mild headache. ID team consulted for antibiotic recommendations  --------------  Gram negative Bacteremia  - likely secondary to recent prostate biopsy (with  source)  - had recent colonoscopy in 7/19 that showed pandiverticulosis, non-bleeding hemorrhoids, ulcer in terminal ileum  - reports constipation followed by multiple episodes of loose stools (non-bloody, non-foul smelling). Diarrhea likely 2/2 recent constipation. However if persistent diarrhea (with rising WBC count),. would check Stool for C. diff (given recent Augmentin and Ciprofloxacin use)    RECOMMENDATIONS:  - discontinue Ceftriaxone  - start Zosyn 3.375g IV q8h  - check CT Abdomen/Pelvis with contrast for prostatitis/abscess  - repeat blood cultures on 9/22  - if persistently rising WBC count with persistent diarrhea and foul smell, check Stool for C. diff  - f/u admission blood Cx    MARISELA Rivero, MD  Fellow, Infectious Diseases  Pager: [phone]  After 5pm and on Weekends: Call [phone]

## 2019-09-20 NOTE — CONSULT NOTE ADULT - SUBJECTIVE AND OBJECTIVE BOX
HPI:  pt 68 y/o M PMhx of secondary myelofibrosis , ET ( JAK2 negative, BCR ABL negative, MPL Exon 10+) presented to ER with pain with urination. Hematology consulted given underlying myelofibrosis.     Patient had prostate biopsy on 9/17 w/ Dr. Roman. After biopsy pt developed fevers and pain with urination.    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 with some decreased hearing in left ear also  Herniated disc: L2-3 withlower bulge  Spinal stenosis  Anemia: on slow iron  Cholesterol serum increased  GERD (gastroesophageal reflux disease)  Hypertension  Rectal bleed: bleeding hemmoroids  Esophagus disorder: removal of cartilage ring aroud esophagus 2/03  Vasectomy status: S/P 12/00  Knee gives way: left knee arthroscopy 11/02  Knee gives way: right knee arthroscopy 1/26/01  Spinal fluid abnorm: CSF leak second to repair of acoustic neuroma had repair right Labyrinthectomy 7/17/98  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 1000 milliLiter(s) (75 mL/Hr) IV Continuous <Continuous>  phenazopyridine 200 milliGRAM(s) Oral every 8 hours  tamsulosin 0.4 milliGRAM(s) Oral every 24 hours  valsartan 160 milliGRAM(s) Oral daily    MEDICATIONS  (PRN):  acetaminophen   Tablet .. 650 milliGRAM(s) Oral every 6 hours PRN Temp greater or equal to 38C (100.4F), Mild Pain (1 - 3)  cyclobenzaprine 10 milliGRAM(s) Oral three times a day PRN Muscle Spasm  senna 2 Tablet(s) Oral at bedtime PRN Constipation      FAMILY HISTORY:      SOCIAL HISTORY: No EtOH, no tobacco    REVIEW OF SYSTEMS:    CONSTITUTIONAL: No weakness, fevers or chills  EYES/ENT: No visual changes;  No vertigo or throat pain   NECK: No pain or stiffness  RESPIRATORY: No cough, wheezing, hemoptysis; No shortness of breath  CARDIOVASCULAR: No chest pain or palpitations  GASTROINTESTINAL: No abdominal or epigastric pain. No nausea, vomiting, or hematemesis; No diarrhea or constipation. No melena or hematochezia.  GENITOURINARY: No dysuria, frequency or hematuria  NEUROLOGICAL: No numbness or weakness  SKIN: No itching, burning, rashes, or lesions   All other review of systems is negative unless indicated above.    Height (cm): 172.7 (09-20 @ 06:45)  Weight (kg): 89.4 (09-19 @ 19:29)  BMI (kg/m2): 30 (09-20 @ 06:45)  BSA (m2): 2.03 (09-20 @ 06:45)    T(F): 101.3 (09-20-19 @ 06:45), Max: 102.2 (09-19-19 @ 21:55)  HR: 119 (09-20-19 @ 06:45)  BP: 121/66 (09-20-19 @ 06:45)  RR: 20 (09-20-19 @ 06:45)  SpO2: 93% (09-20-19 @ 06:45)  Wt(kg): --    GENERAL: NAD, well-developed  HEAD:  Atraumatic, Normocephalic  EYES: EOMI, PERRLA, conjunctiva and sclera clear  NECK: Supple, No JVD  CHEST/LUNG: Clear to auscultation bilaterally; No wheeze  HEART: Regular rate and rhythm; No murmurs, rubs, or gallops  ABDOMEN: Soft, Nontender, Nondistended; Bowel sounds present  EXTREMITIES:  2+ Peripheral Pulses, No clubbing, cyanosis, or edema  NEUROLOGY: non-focal  SKIN: No rashes or lesions                          11.8   20.69 )-----------( 293      ( 20 Sep 2019 09:00 )             37.3       09-20    144  |  104  |  12  ----------------------------<  116<H>  3.7   |  26  |  0.83    Ca    9.0      20 Sep 2019 05:06    TPro  7.2  /  Alb  4.8  /  TBili  1.4<H>  /  DBili  x   /  AST  41<H>  /  ALT  48<H>  /  AlkPhos  103  09-19

## 2019-09-20 NOTE — PROGRESS NOTE ADULT - SUBJECTIVE AND OBJECTIVE BOX
BE MCKEON 69y Male    The patient is a Patient is a 69y old  Male who presents with a chief complaint of fever s/p prostate biopsy (20 Sep 2019 12:22)  Afebrile today but fever to 102 last night. Patient was feeling better this AM, now states that he does not feel as well  Blood cultures returned positive for gm neg organisms and antibiotics changed from Rocephin to Zosyn pending the final identification and sensitivity  Appreciate ID and hematology consultation      Vital Signs Last 24 Hrs  T(C): 37.6 (20 Sep 2019 14:17), Max: 39 (19 Sep 2019 21:55)  T(F): 99.6 (20 Sep 2019 14:17), Max: 102.2 (19 Sep 2019 21:55)  HR: 108 (20 Sep 2019 14:17) (96 - 119)  BP: 98/62 (20 Sep 2019 14:17) (98/62 - 166/89)  BP(mean): --  RR: 18 (20 Sep 2019 14:17) (18 - 20)  SpO2: 94% (20 Sep 2019 14:17) (93% - 98%)    acetaminophen   Tablet .. 650 milliGRAM(s) Oral every 6 hours PRN  allopurinol 300 milliGRAM(s) Oral daily  aspirin enteric coated 81 milliGRAM(s) Oral daily  atorvastatin 20 milliGRAM(s) Oral at bedtime  cyclobenzaprine 10 milliGRAM(s) Oral three times a day PRN  guaiFENesin    Syrup 200 milliGRAM(s) Oral every 6 hours PRN  heparin  Injectable 5000 Unit(s) SubCutaneous every 8 hours  ibuprofen  Tablet. 600 milliGRAM(s) Oral every 8 hours PRN  lactated ringers. 1000 milliLiter(s) IV Continuous <Continuous>  phenazopyridine 200 milliGRAM(s) Oral every 8 hours  piperacillin/tazobactam IVPB.. 3.375 Gram(s) IV Intermittent every 8 hours  senna 2 Tablet(s) Oral at bedtime PRN  tamsulosin 0.4 milliGRAM(s) Oral every 24 hours  valsartan 160 milliGRAM(s) Oral daily          Physical exam:      Urine:     I&O's Summary    19 Sep 2019 07:01  -  20 Sep 2019 07:00  --------------------------------------------------------  IN: 0 mL / OUT: 1500 mL / NET: -1500 mL    20 Sep 2019 07:01  -  20 Sep 2019 15:05  --------------------------------------------------------  IN: 440 mL / OUT: 350 mL / NET: 90 mL        LABS:                        11.8   20.69 )-----------( 293      ( 20 Sep 2019 09:00 )             37.3         144  |  104  |  12  ----------------------------<  116<H>  3.7   |  26  |  0.83    Ca    9.0      20 Sep 2019 05:06    TPro  7.2  /  Alb  4.8  /  TBili  1.4<H>  /  DBili  x   /  AST  41<H>  /  ALT  48<H>  /  AlkPhos  103      Urinalysis Basic - ( 19 Sep 2019 21:49 )    Color: Yellow / Appearance: Clear / S.029 / pH: x  Gluc: x / Ketone: Negative  / Bili: Negative / Urobili: Negative   Blood: x / Protein: 100 mg/dL / Nitrite: Negative   Leuk Esterase: Small / RBC: 4 /hpf / WBC 5 /HPF   Sq Epi: x / Non Sq Epi: 0 / Bacteria: Negative      Urine Culture:  @ 01:21  Urine Culure Resuls   Growth in aerobic bottle: Gram Negative Rods  Growth in anaerobic bottle: Gram Negative Rods  "Due to technical problems, Proteus sp. will Not be reported as part of  the BCID panel until further notice"  ***Blood Panel PCR results on this specimenare available  approximately 3 hours after the Gram stain result.***  Gram stain, PCR, and/or culture results may not always  correspond due to difference in methodologies.  ************************************************************  This PCR assaywas performed using [employer].  The following targets are tested for: Enterococcus,  vancomycin resistant enterococci, Listeria monocytogenes,  coagulase negative staphylococci, S. aureus,  methicillin resistant S. aureus, Streptococcus agalactiae  (Group B), S. pneumoniae, S. pyogenes (Group A),  Acinetobacter baumannii, Enterobacter cloacae, E. coli,  Klebsiella oxytoca, K. pneumoniae, Proteus sp.,  Serratia marcescens, Haemophilus influenzae,  Neisseria meningitidis, Pseudomonas aeruginosa, Candida  albicans, C. glabrata, C krusei, C parapsilosis,  C. tropicalis and the KPC resistance gene.  Organism --        Radiology    Prior notes/chart reviewed.

## 2019-09-20 NOTE — CONSULT NOTE ADULT - ATTENDING COMMENTS
68 yo M PMhx of myeloproliferative disorder. came to the ED c/o fever s/p prostate bx on 9/17, post biopsy pt feel well until this afternoon when pt began to have dysuria, chills/rigors, myalgia and mild headache.  Fever, leukocytosis  Recent prostate biopsy  BCX GNR by PCR  Concern for prostatitis with associated GN bacteremia  Overall, Gram negative bacteremia, fever, leukocytosis, sepsis, prostatitis  - Zosyn 3.375g q 8  - CT A/P w/ contrast (if can tolerate)  - Repeat BCX to clearance  - F/U BCX for S  - Monitor for diarrhea  - Case discussed with  team    Lincoln Peñaloza MD  Pager [phone]  After 5pm and on weekends call [phone] 70 yo M PMhx of myeloproliferative disorder. came to the ED c/o fever s/p prostate bx on 9/17, post biopsy pt feel well until this afternoon when pt began to have dysuria, chills/rigors, myalgia and mild headache.  Fever, leukocytosis  Recent prostate biopsy  BCX GNR by PCR  Concern for prostatitis with associated GN bacteremia  Overall, Gram negative bacteremia, fever, leukocytosis, sepsis, prostatitis  - Zosyn 3.375g q 8  - CT A/P w/ contrast (if can tolerate)  - Repeat BCX to clearance  - F/U BCX for S  - Monitor for diarrhea  - Case discussed with  team    Lincoln Peñaloza MD  Pager [phone]  After 5pm and on weekends call [phone]    I was physically present for the key portions of the evaluation and management service provided. I saw and examined the patient. I agree with the above history, physical, and plan except for any discrepancies which I have documented in “Attending Attestation.” Please refer to “Attending Attestation” for final plan.

## 2019-09-20 NOTE — PATIENT PROFILE ADULT - NSPRONUTRITIONRISK_GEN_A_NUR
"I see patient referred to YARIEL Ramírez, phone:  [phone].  Attempted to call patient at work number provided below, left message on voicemail identified as \"the office of Anrde Alas\" requesting call back to clinic to discuss.  Cassy Jones RN  [facility]    " No indicators present

## 2019-09-20 NOTE — PROGRESS NOTE ADULT - ASSESSMENT
Impression: Acute prostatitis with sepsis secondary to prostate biopsy. Currently on Zosyn and will leave on Zosyn until cultures return. Since we know that this is a prostatitis secondary to the biopsy, I feel that we can hold off on CT scan unless he does not respond to antibiotic therapy.

## 2019-09-20 NOTE — PROVIDER CONTACT NOTE (OTHER) - ASSESSMENT
Pt w. temp of 101.3F, , MEWS 7 triggered. Pt received from ED. Pt due for IVF bolus, to be admin as ordered, pt admin Tylenol as ordered for temp.

## 2019-09-20 NOTE — CONSULT NOTE ADULT - ASSESSMENT
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 Hematology consulted given underlying myelofibrosis.      Fever- being treated for acute prostatitis by urology, ID following  follow blood cultures, urine culture  on ceftriaxone  appreciate excellent medical and urological care    Myelofibrosis-  secondary myelofibrosis , ET ( JAK2 negative, BCR ABL negative, MPL Exon 10+)   on aspirin  follows with Dr. Coker  got 1 unit of platelets prior to prostate biopsy  needs follow up with Dr. Coker after discharge  cbc stable  check cbc with diff daily pt 70 y/o M PMhx of secondary myelofibrosis , ET ( JAK2 negative, BCR ABL negative, MPL Exon 10+) presented to ER with pain with urination. Hematology consulted given underlying myelofibrosis.      Fever- being treated for acute prostatitis by urology, ID following  follow blood cultures, urine culture  on ceftriaxone  appreciate excellent medical and urological care    Myelofibrosis-  secondary myelofibrosis , ET ( JAK2 negative, BCR ABL negative, MPL Exon 10+)   on aspirin  follows with Dr. Coker  got 1 unit of platelets prior to prostate biopsy  needs follow up with Dr. Coker after discharge  cbc stable  check cbc with diff daily

## 2019-09-20 NOTE — CONSULT NOTE ADULT - ATTENDING COMMENTS
70 y/o M PMhx of secondary myelofibrosis , ET ( JAK2 negative, BCR ABL negative, MPL Exon 10+) presented to ER with pain with urination. Being treated for acute prostatitis. Continue aspirin. Follow up with Dr. Coker on discharge.

## 2019-09-20 NOTE — PROGRESS NOTE ADULT - ASSESSMENT
fever s/p prostate bx      -am labs  -cont Ceftriaxone  -f/u blood and urine cultures  -ID consult  -heme/onc consult

## 2019-09-21 LAB
BASOPHILS # BLD AUTO: 0 K/UL — SIGNIFICANT CHANGE UP (ref 0–0.2)
BASOPHILS NFR BLD AUTO: 0 % — SIGNIFICANT CHANGE UP (ref 0–2)
CULTURE RESULTS: SIGNIFICANT CHANGE UP
EOSINOPHIL # BLD AUTO: 0 K/UL — SIGNIFICANT CHANGE UP (ref 0–0.5)
EOSINOPHIL NFR BLD AUTO: 0 % — SIGNIFICANT CHANGE UP (ref 0–6)
HCT VFR BLD CALC: 38 % — LOW (ref 39–50)
HGB BLD-MCNC: 11.6 G/DL — LOW (ref 13–17)
LYMPHOCYTES # BLD AUTO: 0.83 K/UL — LOW (ref 1–3.3)
LYMPHOCYTES # BLD AUTO: 5.1 % — LOW (ref 13–44)
MCHC RBC-ENTMCNC: 28.4 PG — SIGNIFICANT CHANGE UP (ref 27–34)
MCHC RBC-ENTMCNC: 30.5 GM/DL — LOW (ref 32–36)
MCV RBC AUTO: 93.1 FL — SIGNIFICANT CHANGE UP (ref 80–100)
MONOCYTES # BLD AUTO: 3.63 K/UL — HIGH (ref 0–0.9)
MONOCYTES NFR BLD AUTO: 22.2 % — HIGH (ref 2–14)
NEUTROPHILS # BLD AUTO: 10.64 K/UL — HIGH (ref 1.8–7.4)
NEUTROPHILS NFR BLD AUTO: 46.2 % — SIGNIFICANT CHANGE UP (ref 43–77)
PLATELET # BLD AUTO: 243 K/UL — SIGNIFICANT CHANGE UP (ref 150–400)
RBC # BLD: 4.08 M/UL — LOW (ref 4.2–5.8)
RBC # FLD: 15.9 % — HIGH (ref 10.3–14.5)
SPECIMEN SOURCE: SIGNIFICANT CHANGE UP
WBC # BLD: 16.37 K/UL — HIGH (ref 3.8–10.5)
WBC # FLD AUTO: 16.37 K/UL — HIGH (ref 3.8–10.5)

## 2019-09-21 PROCEDURE — 93010 ELECTROCARDIOGRAM REPORT: CPT

## 2019-09-21 RX ADMIN — Medication 200 MILLIGRAM(S): at 05:35

## 2019-09-21 RX ADMIN — PIPERACILLIN AND TAZOBACTAM 25 GRAM(S): 4; .5 INJECTION, POWDER, LYOPHILIZED, FOR SOLUTION INTRAVENOUS at 21:50

## 2019-09-21 RX ADMIN — Medication 600 MILLIGRAM(S): at 21:50

## 2019-09-21 RX ADMIN — Medication 81 MILLIGRAM(S): at 05:34

## 2019-09-21 RX ADMIN — Medication 600 MILLIGRAM(S): at 13:23

## 2019-09-21 RX ADMIN — HEPARIN SODIUM 5000 UNIT(S): 5000 INJECTION INTRAVENOUS; SUBCUTANEOUS at 21:50

## 2019-09-21 RX ADMIN — HEPARIN SODIUM 5000 UNIT(S): 5000 INJECTION INTRAVENOUS; SUBCUTANEOUS at 13:23

## 2019-09-21 RX ADMIN — SODIUM CHLORIDE 75 MILLILITER(S): 9 INJECTION, SOLUTION INTRAVENOUS at 05:37

## 2019-09-21 RX ADMIN — Medication 600 MILLIGRAM(S): at 13:53

## 2019-09-21 RX ADMIN — Medication 600 MILLIGRAM(S): at 22:20

## 2019-09-21 RX ADMIN — ATORVASTATIN CALCIUM 20 MILLIGRAM(S): 80 TABLET, FILM COATED ORAL at 05:34

## 2019-09-21 RX ADMIN — TAMSULOSIN HYDROCHLORIDE 0.4 MILLIGRAM(S): 0.4 CAPSULE ORAL at 09:20

## 2019-09-21 RX ADMIN — Medication 200 MILLIGRAM(S): at 21:50

## 2019-09-21 RX ADMIN — Medication 600 MILLIGRAM(S): at 05:36

## 2019-09-21 RX ADMIN — SENNA PLUS 2 TABLET(S): 8.6 TABLET ORAL at 05:37

## 2019-09-21 RX ADMIN — Medication 600 MILLIGRAM(S): at 05:40

## 2019-09-21 RX ADMIN — PIPERACILLIN AND TAZOBACTAM 25 GRAM(S): 4; .5 INJECTION, POWDER, LYOPHILIZED, FOR SOLUTION INTRAVENOUS at 05:35

## 2019-09-21 RX ADMIN — PIPERACILLIN AND TAZOBACTAM 25 GRAM(S): 4; .5 INJECTION, POWDER, LYOPHILIZED, FOR SOLUTION INTRAVENOUS at 13:24

## 2019-09-21 RX ADMIN — Medication 200 MILLIGRAM(S): at 22:08

## 2019-09-21 RX ADMIN — Medication 200 MILLIGRAM(S): at 13:23

## 2019-09-21 RX ADMIN — Medication 300 MILLIGRAM(S): at 05:34

## 2019-09-21 RX ADMIN — Medication 200 MILLIGRAM(S): at 05:34

## 2019-09-21 RX ADMIN — HEPARIN SODIUM 5000 UNIT(S): 5000 INJECTION INTRAVENOUS; SUBCUTANEOUS at 05:34

## 2019-09-21 NOTE — PROGRESS NOTE ADULT - SUBJECTIVE AND OBJECTIVE BOX
UROLOGY DAILY PROGRESS NOTE:     Subjective: Patient seen and examined at bedside. C/o urinary frequency. Last fever yesterday afternoon.       Objective:  Vital signs  T(F): , Max: 100.4 (09-20-19 @ 17:30)  HR: 106 (09-21-19 @ 05:30)  BP: 93/55 (09-21-19 @ 05:30)  SpO2: 100% (09-21-19 @ 05:30)  Wt(kg): --    I&O's Summary    20 Sep 2019 07:01  -  21 Sep 2019 07:00  --------------------------------------------------------  IN: 1370 mL / OUT: 1350 mL / NET: 20 mL        Gen: NAD  Pulm: No respiratory distress, no subcostal retractions  CV: RRR, no JVD  Abd: Soft, NT, ND  : voiding     Labs:  09-20  20.69 / 37.3  /x      09-20  x     / x     /0.83                           11.8   20.69 )-----------( 293      ( 20 Sep 2019 09:00 )             37.3     09-20    144  |  104  |  12  ----------------------------<  116<H>  3.7   |  26  |  0.83    Ca    9.0      20 Sep 2019 05:06    TPro  7.2  /  Alb  4.8  /  TBili  1.4<H>  /  DBili  x   /  AST  41<H>  /  ALT  48<H>  /  AlkPhos  103  09-19        Urine Cx: pending     Culture - Blood (09.20.19 @ 01:21)    Gram Stain:   Growth in aerobic bottle: Gram Negative Rods  Growth in anaerobic bottle: Gram Negative Rods    -  Klebsiella pneumoniae: Detec    Specimen Source: .Blood    Organism: Blood Culture PCR    Culture Results:   Growth in aerobic bottle: Gram Negative Rods  Growth in anaerobic bottle: Gram Negative Rods  "Due to technical problems, Proteus sp. will Not be reported as part of  the BCID panel until further notice"  ***Blood Panel PCR results on this specimenare available  approximately 3 hours after the Gram stain result.***  Gram stain, PCR, and/or culture results may not always  correspond due to difference in methodologies.  ************************************************************  This PCR assaywas performed using [employer].  The following targets are tested for: Enterococcus,  vancomycin resistant enterococci, Listeria monocytogenes,  coagulase negative staphylococci, S. aureus,  methicillin resistant S. aureus, Streptococcus agalactiae  (Group B), S. pneumoniae, S. pyogenes (Group A),  Acinetobacter baumannii, Enterobacter cloacae, E. coli,  Klebsiella oxytoca, K. pneumoniae, Proteus sp.,  Serratia marcescens, Haemophilus influenzae,  Neisseria meningitidis, Pseudomonas aeruginosa, Candida  albicans, C. glabrata, C krusei, C parapsilosis,  C. tropicalis and the KPC resistance gene.    Organism Identification: Blood Culture PCR    Method Type: PCR

## 2019-09-21 NOTE — PROGRESS NOTE ADULT - ASSESSMENT
fever s/p prostate bx  -am labs  -cont Ceftriaxone  -f/u blood and urine cultures  -ID following   -heme/onc following  - wean O2  - recheck PVR fever s/p prostate bx  -am labs  -cont Zosyn  -f/u blood and urine cultures  -ID following   -heme/onc following  - wean O2  - recheck PVR    Attending Addition (9:15 PM) - Patient is currently on Zosyn. Last fever was 100.4 last night, afebrile since that time. Feels better. WBC count coming down. Klebsiella on blood cultures, sensitivities still pending. Urine culture negative  Plan: Continue Zosyn until sensitivities return

## 2019-09-22 LAB
HCT VFR BLD CALC: 32.6 % — LOW (ref 39–50)
HGB BLD-MCNC: 10.2 G/DL — LOW (ref 13–17)
MCHC RBC-ENTMCNC: 28.6 PG — SIGNIFICANT CHANGE UP (ref 27–34)
MCHC RBC-ENTMCNC: 31.3 GM/DL — LOW (ref 32–36)
MCV RBC AUTO: 91.3 FL — SIGNIFICANT CHANGE UP (ref 80–100)
PLATELET # BLD AUTO: 226 K/UL — SIGNIFICANT CHANGE UP (ref 150–400)
RBC # BLD: 3.57 M/UL — LOW (ref 4.2–5.8)
RBC # FLD: 15.9 % — HIGH (ref 10.3–14.5)
WBC # BLD: 22.9 K/UL — HIGH (ref 3.8–10.5)
WBC # FLD AUTO: 22.9 K/UL — HIGH (ref 3.8–10.5)

## 2019-09-22 PROCEDURE — 99232 SBSQ HOSP IP/OBS MODERATE 35: CPT

## 2019-09-22 PROCEDURE — 71045 X-RAY EXAM CHEST 1 VIEW: CPT | Mod: 26

## 2019-09-22 PROCEDURE — 93010 ELECTROCARDIOGRAM REPORT: CPT | Mod: 76

## 2019-09-22 RX ORDER — LACTOBACILLUS ACIDOPHILUS 100MM CELL
1 CAPSULE ORAL
Refills: 0 | Status: DISCONTINUED | OUTPATIENT
Start: 2019-09-22 | End: 2019-09-27

## 2019-09-22 RX ORDER — PANTOPRAZOLE SODIUM 20 MG/1
40 TABLET, DELAYED RELEASE ORAL
Refills: 0 | Status: DISCONTINUED | OUTPATIENT
Start: 2019-09-22 | End: 2019-09-27

## 2019-09-22 RX ORDER — MEROPENEM 1 G/30ML
1000 INJECTION INTRAVENOUS EVERY 8 HOURS
Refills: 0 | Status: DISCONTINUED | OUTPATIENT
Start: 2019-09-22 | End: 2019-09-23

## 2019-09-22 RX ADMIN — Medication 650 MILLIGRAM(S): at 23:12

## 2019-09-22 RX ADMIN — VALSARTAN 160 MILLIGRAM(S): 80 TABLET ORAL at 06:08

## 2019-09-22 RX ADMIN — Medication 650 MILLIGRAM(S): at 22:42

## 2019-09-22 RX ADMIN — HEPARIN SODIUM 5000 UNIT(S): 5000 INJECTION INTRAVENOUS; SUBCUTANEOUS at 13:38

## 2019-09-22 RX ADMIN — HEPARIN SODIUM 5000 UNIT(S): 5000 INJECTION INTRAVENOUS; SUBCUTANEOUS at 06:08

## 2019-09-22 RX ADMIN — Medication 5 MILLIGRAM(S): at 22:42

## 2019-09-22 RX ADMIN — MEROPENEM 100 MILLIGRAM(S): 1 INJECTION INTRAVENOUS at 22:32

## 2019-09-22 RX ADMIN — Medication 600 MILLIGRAM(S): at 06:37

## 2019-09-22 RX ADMIN — Medication 1 TABLET(S): at 12:12

## 2019-09-22 RX ADMIN — PIPERACILLIN AND TAZOBACTAM 25 GRAM(S): 4; .5 INJECTION, POWDER, LYOPHILIZED, FOR SOLUTION INTRAVENOUS at 06:07

## 2019-09-22 RX ADMIN — Medication 200 MILLIGRAM(S): at 06:07

## 2019-09-22 RX ADMIN — Medication 300 MILLIGRAM(S): at 06:08

## 2019-09-22 RX ADMIN — Medication 1 TABLET(S): at 17:19

## 2019-09-22 RX ADMIN — TAMSULOSIN HYDROCHLORIDE 0.4 MILLIGRAM(S): 0.4 CAPSULE ORAL at 08:32

## 2019-09-22 RX ADMIN — Medication 600 MILLIGRAM(S): at 06:07

## 2019-09-22 RX ADMIN — SODIUM CHLORIDE 125 MILLILITER(S): 9 INJECTION, SOLUTION INTRAVENOUS at 06:08

## 2019-09-22 RX ADMIN — HEPARIN SODIUM 5000 UNIT(S): 5000 INJECTION INTRAVENOUS; SUBCUTANEOUS at 22:31

## 2019-09-22 RX ADMIN — Medication 600 MILLIGRAM(S): at 17:18

## 2019-09-22 RX ADMIN — Medication 81 MILLIGRAM(S): at 06:07

## 2019-09-22 RX ADMIN — MEROPENEM 100 MILLIGRAM(S): 1 INJECTION INTRAVENOUS at 13:38

## 2019-09-22 RX ADMIN — ATORVASTATIN CALCIUM 20 MILLIGRAM(S): 80 TABLET, FILM COATED ORAL at 06:08

## 2019-09-22 NOTE — CONSULT NOTE ADULT - SUBJECTIVE AND OBJECTIVE BOX
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 with some decreased hearing in left ear also  Herniated disc: L2-3 withlower bulge  Spinal stenosis  Anemia: on slow iron  Cholesterol serum increased  GERD (gastroesophageal reflux disease)  Hypertension  Rectal bleed: bleeding hemmoroids  Esophagus disorder: removal of cartilage ring aroud esophagus 2/03  Vasectomy status: S/P 12/00  Knee gives way: left knee arthroscopy 11/02  Knee gives way: right knee arthroscopy 1/26/01  Spinal fluid abnorm: CSF leak second to repair of acoustic neuroma had repair right Labyrinthectomy 7/17/98  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      FAMILY HISTORY:      SOCIAL HISTORY:  Smoking: [ ] Never Smoked [ ] Former Smoker (__ packs x ___ years) [ ] Current Smoker  (__ packs x ___ years)  Substance Use: [ ] Never Used [ ] Used ____  EtOH Use:  Marital Status: [ ] Single [ ]  [ ]  [ ]   Sexual History:   Occupation:  Recent Travel:  Country of Birth:  Advance Directives:    Allergies    codeine (Pruritus; Other)    Intolerances        HOME MEDICATIONS:  Home Medications:  allopurinol 300 mg oral tablet: 1 tab(s) orally once a day (19 Sep 2019 23:09)  Aspir-Low 81 mg oral delayed release tablet: 1 tab(s) orally once a day (19 Sep 2019 23:09)  atorvastatin 20 mg oral tablet: 1 tab(s) orally once a day (19 Sep 2019 23:09)  colchicine 0.6 mg oral tablet: 1 tab(s) orally once a day, As Needed (19 Sep 2019 23:09)  cyclobenzaprine 10 mg oral tablet: 1 tab(s) orally 3 times a day, As Needed (19 Sep 2019 23:09)  tadalafil 10 mg oral tablet: 1 tab(s) orally once a day, As Needed (19 Sep 2019 23:09)  valsartan 160 mg oral tablet: 1 tab(s) orally once a day (19 Sep 2019 23:09)      REVIEW OF SYSTEMS:  Constitutional: [ ] negative [ ] fevers [ ] chills [ ] weight loss [ ] weight gain  HEENT: [ ] negative [ ] dry eyes [ ] eye irritation [ ] postnasal drip [ ] nasal congestion  CV: [ ] negative  [ ] chest pain [ ] orthopnea [ ] palpitations [ ] murmur  Resp: [ ] negative [ ] cough [ ] shortness of breath [ ] dyspnea [ ] wheezing [ ] sputum [ ] hemoptysis  GI: [ ] negative [ ] nausea [ ] vomiting [ ] diarrhea [ ] constipation [ ] abd pain [ ] dysphagia   : [ ] negative [ ] dysuria [ ] nocturia [ ] hematuria [ ] increased urinary frequency  Musculoskeletal: [ ] negative [ ] back pain [ ] myalgias [ ] arthralgias [ ] fracture  Skin: [ ] negative [ ] rash [ ] itch  Neurological: [ ] negative [ ] headache [ ] dizziness [ ] syncope [ ] weakness [ ] numbness  Psychiatric: [ ] negative [ ] anxiety [ ] depression  Endocrine: [ ] negative [ ] diabetes [ ] thyroid problem  Hematologic/Lymphatic: [ ] negative [ ] anemia [ ] bleeding problem  Allergic/Immunologic: [ ] negative [ ] itchy eyes [ ] nasal discharge [ ] hives [ ] angioedema  [ ] All other systems negative  [ ] Unable to assess ROS because ________    OBJECTIVE:  ICU Vital Signs Last 24 Hrs  T(C): 37.4 (22 Sep 2019 19:41), Max: 37.7 (21 Sep 2019 21:21)  T(F): 99.3 (22 Sep 2019 19:41), Max: 99.9 (21 Sep 2019 21:21)  HR: 121 (22 Sep 2019 19:41) (95 - 121)  BP: 137/77 (22 Sep 2019 19:41) (108/67 - 137/77)  BP(mean): --  ABP: --  ABP(mean): --  RR: 18 (22 Sep 2019 19:41) (18 - 19)  SpO2: 91% (22 Sep 2019 19:41) (91% - 96%)        09-21 @ 07:01  -  09-22 @ 07:00  --------------------------------------------------------  IN: 580 mL / OUT: 2500 mL / NET: -1920 mL    09-22 @ 07:01  -  09-22 @ 19:50  --------------------------------------------------------  IN: 800 mL / OUT: 1300 mL / NET: -500 mL      CAPILLARY BLOOD GLUCOSE          PHYSICAL EXAM:  General:   HEENT:   Lymph Nodes:  Neck:   Respiratory:   Cardiovascular:   Abdomen:   Extremities:   Skin:   Neurological:  Psychiatry:    HOSPITAL MEDICATIONS:  Standing Meds:  allopurinol 300 milliGRAM(s) Oral daily  aspirin enteric coated 81 milliGRAM(s) Oral daily  atorvastatin 20 milliGRAM(s) Oral at bedtime  heparin  Injectable 5000 Unit(s) SubCutaneous every 8 hours  lactobacillus acidophilus 1 Tablet(s) Oral three times a day with meals  meropenem  IVPB 1000 milliGRAM(s) IV Intermittent every 8 hours  pantoprazole    Tablet 40 milliGRAM(s) Oral before breakfast  tamsulosin 0.4 milliGRAM(s) Oral every 24 hours  valsartan 160 milliGRAM(s) Oral daily      PRN Meds:  acetaminophen   Tablet .. 650 milliGRAM(s) Oral every 6 hours PRN  cyclobenzaprine 10 milliGRAM(s) Oral three times a day PRN  guaiFENesin    Syrup 200 milliGRAM(s) Oral every 6 hours PRN  ibuprofen  Tablet. 600 milliGRAM(s) Oral every 8 hours PRN  senna 2 Tablet(s) Oral at bedtime PRN      LABS:                        10.2   22.90 )-----------( 226      ( 22 Sep 2019 09:38 )             32.6     Hgb Trend: 10.2<--, 11.6<--, 11.8<--, 13.0<--, 14.0<--        Creatinine Trend: 0.83<--, 0.89<--            MICROBIOLOGY:     Culture - Blood (collected 21 Sep 2019 10:43)  Source: .Blood  Preliminary Report (22 Sep 2019 11:00):    No growth to date.    Culture - Blood (collected 21 Sep 2019 10:43)  Source: .Blood  Preliminary Report (22 Sep 2019 11:00):    No growth to date.    Culture - Blood (collected 20 Sep 2019 01:21)  Source: .Blood  Gram Stain (20 Sep 2019 14:12):    Growth in aerobic bottle: Gram Negative Rods    Growth in anaerobic bottle: Gram Negative Rods  Preliminary Report (21 Sep 2019 12:54):    Growth in aerobic and anaerobic bottles: Klebsiella pneumoniae    "Due to technical problems, Proteus sp. will Not be reported as part of    the BCID panel until further notice"    ***Blood Panel PCR results on this specimen are available    approximately 3 hours after the Gram stain result.***    Gram stain, PCR, and/or culture results may not always    correspond due to difference in methodologies.    ************************************************************    This PCR assay was performed using [email or web address].    The following targets are tested for: Enterococcus,    vancomycin resistant enterococci, Listeria monocytogenes,    coagulase negative staphylococci, S. aureus,    methicillin resistant S. aureus, Streptococcus agalactiae    (Group B), S. pneumoniae, S. pyogenes (Group A),    Acinetobacter baumannii, Enterobacter cloacae, E. coli,    Klebsiella oxytoca, K. pneumoniae, Proteus sp.,    Serratia marcescens, Haemophilus influenzae,    Neisseria meningitidis, Pseudomonas aeruginosa, Candida    albicans, C. glabrata, C krusei, C parapsilosis,    C. tropicalis and the KPC resistance gene.  Organism: Blood Culture PCR (20 Sep 2019 15:07)  Organism: Blood Culture PCR (20 Sep 2019 15:07)    Culture - Blood (collected 20 Sep 2019 01:21)  Source: .Blood  Gram Stain (20 Sep 2019 15:28):    Growth in anaerobic bottle: Gram Negative Rods    Growth in aerobic bottle: Gram Negative Rods  Preliminary Report (21 Sep 2019 19:38):    Growth in aerobic and anaerobic bottles: Klebsiella pneumoniae    See previous culture [id number]    Culture - Urine (collected 20 Sep 2019 01:08)  Source: .Urine  Final Report (21 Sep 2019 10:38):    <10,000 CFU/mL Normal Urogenital Esther        RADIOLOGY:  [ ] Reviewed and interpreted by me    PULMONARY FUNCTION TESTS:    EKG: CHIEF COMPLAINT:  fevers, chills    HPI:    69M with essential thrombocytosis/MDS now presents with sepsis due to klebsiella after a prostate biopsy.   PAtient with mild hypoxemia since admission. On 2L NC. NO complaints of dyspnea, cough, or sputum production.   Had fevers, chills, malaise,  on presentation all of which are improving.     He is a never smoker. Worked only as a . Never had exposure to toxins, industrial dust, metals or chemicals. NO pets.     He does not have dyspnea with ambulation though he is limited in ambulation due to feeling generally ill and chronic back pain.     On oxygen his saturation is in the mid to high 90s. Off, it is 88%. Off oxygen he does not feel short of breath.     With 10 deep breaths or using the incentive spirometer his saturation improves to 95% on room air.         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 with some decreased hearing in left ear also  Herniated disc: L2-3 withlower bulge  Spinal stenosis  Anemia: on slow iron  Cholesterol serum increased  GERD (gastroesophageal reflux disease)  Hypertension  Rectal bleed: bleeding hemmoroids  Esophagus disorder: removal of cartilage ring aroud esophagus 2/03  Vasectomy status: S/P 12/00  Knee gives way: left knee arthroscopy 11/02  Knee gives way: right knee arthroscopy 1/26/01  Spinal fluid abnorm: CSF leak second to repair of acoustic neuroma had repair right Labyrinthectomy 7/17/98  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      FAMILY HISTORY:      SOCIAL HISTORY:  Smoking: [ X] Never Smoked [ ] Former Smoker (__ packs x ___ years) [ ] Current Smoker  (__ packs x ___ years)  Substance Use: [X ] Never Used [ ] Used ____  EtOH Use:no  Marital Status: [ ] Single [ X]  [ ]  [ ]   Occupation:   Recent Travel: NO  Country of Birth: [place]  Advance Directives: full code    Allergies    codeine (Pruritus; Other)    Intolerances        HOME MEDICATIONS:  Home Medications:  allopurinol 300 mg oral tablet: 1 tab(s) orally once a day (19 Sep 2019 23:09)  Aspir-Low 81 mg oral delayed release tablet: 1 tab(s) orally once a day (19 Sep 2019 23:09)  atorvastatin 20 mg oral tablet: 1 tab(s) orally once a day (19 Sep 2019 23:09)  colchicine 0.6 mg oral tablet: 1 tab(s) orally once a day, As Needed (19 Sep 2019 23:09)  cyclobenzaprine 10 mg oral tablet: 1 tab(s) orally 3 times a day, As Needed (19 Sep 2019 23:09)  tadalafil 10 mg oral tablet: 1 tab(s) orally once a day, As Needed (19 Sep 2019 23:09)  valsartan 160 mg oral tablet: 1 tab(s) orally once a day (19 Sep 2019 23:09)      REVIEW OF SYSTEMS:  Constitutional: [X ] negative [ ] fevers [ ] chills [ ] weight loss [ ] weight gain  HEENT: [ X] negative [ ] dry eyes [ ] eye irritation [ ] postnasal drip [ ] nasal congestion  CV: [X ] negative  [ ] chest pain [ ] orthopnea [ ] palpitations [ ] murmur  Resp: [X ] negative [ ] cough [ ] shortness of breath [ ] dyspnea [ ] wheezing [ ] sputum [ ] hemoptysis  GI: [X ] negative [ ] nausea [ ] vomiting [ ] diarrhea [ ] constipation [ ] abd pain [ ] dysphagia   : [ ] negative [X ] dysuria [ ] nocturia [ ] hematuria [ X] increased urinary frequency  Musculoskeletal: [X ] negative [ ] back pain [ ] myalgias [ ] arthralgias [ ] fracture  Skin: [ X] negative [ ] rash [ ] itch  Neurological: [ X] negative [ ] headache [ ] dizziness [ ] syncope [ ] weakness [ ] numbness  Psychiatric: [X ] negative [ ] anxiety [ ] depression  Endocrine: [X ] negative [ ] diabetes [ ] thyroid problem  Hematologic/Lymphatic: [ ] negative [ ] anemia [ ] bleeding problem  Allergic/Immunologic: [ ] negative [ ] itchy eyes [ ] nasal discharge [ ] hives [ ] angioedema    OBJECTIVE:  ICU Vital Signs Last 24 Hrs  T(C): 37.4 (22 Sep 2019 19:41), Max: 37.7 (21 Sep 2019 21:21)  T(F): 99.3 (22 Sep 2019 19:41), Max: 99.9 (21 Sep 2019 21:21)  HR: 121 (22 Sep 2019 19:41) (95 - 121)  BP: 137/77 (22 Sep 2019 19:41) (108/67 - 137/77)  RR: 18 (22 Sep 2019 19:41) (18 - 19)  SpO2: 91% (22 Sep 2019 19:41) (91% - 96%)        09-21 @ 07:01 - 09-22 @ 07:00  --------------------------------------------------------  IN: 580 mL / OUT: 2500 mL / NET: -1920 mL    09-22 @ 07:01 - 09-22 @ 19:50  --------------------------------------------------------  IN: 800 mL / OUT: 1300 mL / NET: -500 mL      CAPILLARY BLOOD GLUCOSE      PHYSICAL EXAM:  General:  well appearing man, no distress  Respiratory: normal effort on room air and 2L NC  few small bilateral crackles  Cardiovascular: rate reg, normal s1 and s2  Abdomen: soft, NT, ND  Extremities: thin, no edema  Skin: no rashes  Neurological: awake, alert, oriented no deficits      HOSPITAL MEDICATIONS:  Standing Meds:  allopurinol 300 milliGRAM(s) Oral daily  aspirin enteric coated 81 milliGRAM(s) Oral daily  atorvastatin 20 milliGRAM(s) Oral at bedtime  heparin  Injectable 5000 Unit(s) SubCutaneous every 8 hours  lactobacillus acidophilus 1 Tablet(s) Oral three times a day with meals  meropenem  IVPB 1000 milliGRAM(s) IV Intermittent every 8 hours  pantoprazole    Tablet 40 milliGRAM(s) Oral before breakfast  tamsulosin 0.4 milliGRAM(s) Oral every 24 hours  valsartan 160 milliGRAM(s) Oral daily      PRN Meds:  acetaminophen   Tablet .. 650 milliGRAM(s) Oral every 6 hours PRN  cyclobenzaprine 10 milliGRAM(s) Oral three times a day PRN  guaiFENesin    Syrup 200 milliGRAM(s) Oral every 6 hours PRN  ibuprofen  Tablet. 600 milliGRAM(s) Oral every 8 hours PRN  senna 2 Tablet(s) Oral at bedtime PRN      LABS:                        10.2   22.90 )-----------( 226      ( 22 Sep 2019 09:38 )             32.6     Hgb Trend: 10.2<--, 11.6<--, 11.8<--, 13.0<--, 14.0<--        Creatinine Trend: 0.83<--, 0.89<--            MICROBIOLOGY:     Culture - Blood (collected 21 Sep 2019 10:43)  Source: .Blood  Preliminary Report (22 Sep 2019 11:00):    No growth to date.    Culture - Blood (collected 21 Sep 2019 10:43)  Source: .Blood  Preliminary Report (22 Sep 2019 11:00):    No growth to date.    Culture - Blood (collected 20 Sep 2019 01:21)  Source: .Blood  Gram Stain (20 Sep 2019 14:12):    Growth in aerobic bottle: Gram Negative Rods    Growth in anaerobic bottle: Gram Negative Rods  Preliminary Report (21 Sep 2019 12:54):    Growth in aerobic and anaerobic bottles: Klebsiella pneumoniae    "Due to technical problems, Proteus sp. will Not be reported as part of    the BCID panel until further notice"    ***Blood Panel PCR results on this specimen are available    approximately 3 hours after the Gram stain result.***    Gram stain, PCR, and/or culture results may not always    correspond due to difference in methodologies.    ************************************************************    This PCR assay was performed using [employer].    The following targets are tested for: Enterococcus,    vancomycin resistant enterococci, Listeria monocytogenes,    coagulase negative staphylococci, S. aureus,    methicillin resistant S. aureus, Streptococcus agalactiae    (Group B), S. pneumoniae, S. pyogenes (Group A),    Acinetobacter baumannii, Enterobacter cloacae, E. coli,    Klebsiella oxytoca, K. pneumoniae, Proteus sp.,    Serratia marcescens, Haemophilus influenzae,    Neisseria meningitidis, Pseudomonas aeruginosa, Candida    albicans, C. glabrata, C krusei, C parapsilosis,    C. tropicalis and the KPC resistance gene.  Organism: Blood Culture PCR (20 Sep 2019 15:07)  Organism: Blood Culture PCR (20 Sep 2019 15:07)    Culture - Blood (collected 20 Sep 2019 01:21)  Source: .Blood  Gram Stain (20 Sep 2019 15:28):    Growth in anaerobic bottle: Gram Negative Rods    Growth in aerobic bottle: Gram Negative Rods  Preliminary Report (21 Sep 2019 19:38):    Growth in aerobic and anaerobic bottles: Klebsiella pneumoniae    See previous culture [id number]    Culture - Urine (collected 20 Sep 2019 01:08)  Source: .Urine  Final Report (21 Sep 2019 10:38):    <10,000 CFU/mL Normal Urogenital Esther        RADIOLOGY:  [ ] Reviewed and interpreted by me    PULMONARY FUNCTION TESTS:    EKG:

## 2019-09-22 NOTE — CONSULT NOTE ADULT - ASSESSMENT
Assessment    69yM with no pulmonary history, presenting now with klebsiella bacteremia after prostate biopsy.  Mild hypoxemia corrects with 2L NC and deep inspiration. Most likely due to some capillary leak after bacteremia and positional atelectasis from being mostly bedbound. Having a hard time sitting up and standing from chronic back pain and pain related to the biopsy/infection but when he is upright his oxygenation improves at bedside.   He is not coughing, has no wheezes, and is producing no sputum.       Recs    - Incentive spirometer 10x per hour  - Continue supplemental oxygen for SpO2>88%.  No need for nebs, other antibiotics at this time.

## 2019-09-22 NOTE — PROVIDER CONTACT NOTE (OTHER) - ASSESSMENT
Pt on pulse oximetry w/HR from 110-121 at times; 116 when VSS taken. VSS otherwise. Pt denies feeling SOB, difficulty breathing or chest pain.

## 2019-09-22 NOTE — PROGRESS NOTE ADULT - ASSESSMENT
fever s/p prostate bx  -am labs  -cont Zosyn  -f/u blood and urine cultures  -ID following   -heme/onc following  - wean O2 fever s/p prostate bx  -am labs  -cont Zosyn  -f/u blood and urine cultures  -ID following   -heme/onc following  - wean O2    Attending Note (10:55 AM): Fever is down and essentially normal, WBC went back up to 22,000 - question if related to infection or his hematological problem. He is feeling better but is still on O2 at this time, and still on significant IV hydration. Discussed with ID attending. Hold off on CT scan at this time, but if repeat blood culture is still positive will then get a CT scan. ID has change antibiotics to Merepenim

## 2019-09-22 NOTE — PROVIDER CONTACT NOTE (OTHER) - ASSESSMENT
Heart rate remains elevated (110-120) while on continuous pulse ox, patient weened to 1L O2 but desats at times to 85-88%. No apparent distress noted at this time. Denies SOB, difficulty breathing, or chest pain. VSS at this time. Upon assessment, abdomen appears slightly distended but soft when palpated. Patient complains of constipation but endorsed BM today.

## 2019-09-22 NOTE — PROGRESS NOTE ADULT - SUBJECTIVE AND OBJECTIVE BOX
UROLOGY DAILY PROGRESS NOTE:     Subjective: Patient seen and examined at bedside. No overnight events.       Objective:  Vital signs  T(F): , Max: 99.9 (09-21-19 @ 21:21)  HR: 95 (09-22-19 @ 05:04)  BP: 110/73 (09-22-19 @ 05:04)  SpO2: 95% (09-22-19 @ 05:04)  Wt(kg): --    I&O's Summary    21 Sep 2019 07:01  -  22 Sep 2019 07:00  --------------------------------------------------------  IN: 580 mL / OUT: 2500 mL / NET: -1920 mL        Gen: NAD  Pulm: No respiratory distress, no subcostal retractions  CV: RRR, no JVD  Abd: Soft, NT, ND  Back: No CvAt    Labs:  09-21  16.37 / 38.0  /x      09-20  20.69 / 37.3  /x                              11.6   16.37 )-----------( 243      ( 21 Sep 2019 10:53 )             38.0           Urine Cx:  Culture - Urine (09.20.19 @ 01:08)    Specimen Source: .Urine    Culture Results:   <10,000 CFU/mL Normal Urogenital Esther    Culture - Blood (09.20.19 @ 01:21)    Gram Stain:   Growth in aerobic bottle: Gram Negative Rods  Growth in anaerobic bottle: Gram Negative Rods    -  Klebsiella pneumoniae: Detec    Specimen Source: .Blood    Organism: Blood Culture PCR    Culture Results:   Growth in aerobic and anaerobic bottles: Klebsiella pneumoniae  "Due to technical problems, Proteus sp. will Not be reported as part of  the BCID panel until further notice"  ***Blood Panel PCR results on this specimen are available  approximately 3 hours after the Gram stain result.***  Gram stain, PCR, and/or culture results may not always  correspond due to difference in methodologies.  ************************************************************  This PCR assay was performed using [employer].  The following targets are tested for: Enterococcus,  vancomycin resistant enterococci, Listeria monocytogenes,  coagulase negative staphylococci, S. aureus,  methicillin resistant S. aureus, Streptococcus agalactiae  (Group B), S. pneumoniae, S. pyogenes (Group A),  Acinetobacter baumannii, Enterobacter cloacae, E. coli,  Klebsiella oxytoca, K. pneumoniae, Proteus sp.,  Serratia marcescens, Haemophilus influenzae,  Neisseria meningitidis, Pseudomonas aeruginosa, Candida  albicans, C. glabrata, C krusei, C parapsilosis,  C. tropicalis and the KPC resistance gene.    Organism Identification: Blood Culture PCR    Method Type: PCR

## 2019-09-22 NOTE — PROGRESS NOTE ADULT - SUBJECTIVE AND OBJECTIVE BOX
INFECTIOUS DISEASES FOLLOW UP-- Meagan Mendez  [phone]    This is a follow up note for this  69yMale with  Fever, MDS and Klebsiella bacteremia high grade following a prostate biopsy    Feels better but still weak, requiring supplemental oxygen      ROS:  CONSTITUTIONAL:  No fever, good appetite  CARDIOVASCULAR:  No chest pain or palpitations  RESPIRATORY:  No dyspnea  GASTROINTESTINAL:  No nausea, vomiting, diarrhea, or abdominal pain  GENITOURINARY:  No dysuria  NEUROLOGIC:  No headache,     Allergies    codeine (Pruritus; Other)    Intolerances        ANTIBIOTICS/RELEVANT:  antimicrobials  meropenem  IVPB 1000 milliGRAM(s) IV Intermittent every 8 hours    immunologic:    OTHER:  acetaminophen   Tablet .. 650 milliGRAM(s) Oral every 6 hours PRN  allopurinol 300 milliGRAM(s) Oral daily  aspirin enteric coated 81 milliGRAM(s) Oral daily  atorvastatin 20 milliGRAM(s) Oral at bedtime  cyclobenzaprine 10 milliGRAM(s) Oral three times a day PRN  guaiFENesin    Syrup 200 milliGRAM(s) Oral every 6 hours PRN  heparin  Injectable 5000 Unit(s) SubCutaneous every 8 hours  ibuprofen  Tablet. 600 milliGRAM(s) Oral every 8 hours PRN  lactated ringers. 1000 milliLiter(s) IV Continuous <Continuous>  pantoprazole    Tablet 40 milliGRAM(s) Oral before breakfast  senna 2 Tablet(s) Oral at bedtime PRN  tamsulosin 0.4 milliGRAM(s) Oral every 24 hours  valsartan 160 milliGRAM(s) Oral daily      Objective:  Vital Signs Last 24 Hrs  T(C): 36.8 (22 Sep 2019 08:50), Max: 37.7 (21 Sep 2019 21:21)  T(F): 98.2 (22 Sep 2019 08:50), Max: 99.9 (21 Sep 2019 21:21)  HR: 98 (22 Sep 2019 08:50) (95 - 116)  BP: 111/72 (22 Sep 2019 08:50) (108/67 - 120/76)  BP(mean): --  RR: 18 (22 Sep 2019 08:50) (18 - 19)  SpO2: 96% (22 Sep 2019 08:50) (93% - 97%)    PHYSICAL EXAM:  Constitutional:no acute distress, low grade temp  Eyes:CHARLIE, EOMI  Ear/Nose/Throat: no oral lesions, nasal oxygen	  Respiratory: clear BL anteriorly  Cardiovascular: S1S2  Gastrointestinal:soft, (+) BS, no tenderness  Extremities:no e/e/c  No Lymphadenopathy  IV sites not inflammed.    LABS:                        10.2   22.90 )-----------( 226      ( 22 Sep 2019 09:38 )             32.6                 MICROBIOLOGY:            RECENT CULTURES:  09-20 @ 01:21  .Blood  Blood Culture PCR  Blood Culture PCR  PCR    Growth in aerobic and anaerobic bottles: Klebsiella pneumoniae  See previous culture [id number]  --  09-20 @ 01:08  .Urine  --  --  --    <10,000 CFU/mL Normal Urogenital Esther  --      RADIOLOGY & ADDITIONAL STUDIES:    < from: Xray Chest 2 Views PA/Lat (09.19.19 @ 22:33) >    IMPRESSION:   Clear lungs.    < end of copied text >

## 2019-09-22 NOTE — PROGRESS NOTE ADULT - ASSESSMENT
ASSESSMENT:  69/M with PMH myeloproliferative disorder and essential thrombocytosis, HTN/HLD, anemia. came to the ED c/o fever s/p prostate bx on 9/17 w/ Dr. Roman. A/w dysuria, chills/rigors, myalgia and mild headache. ID team consulted for antibiotic recommendations  --------------  Gram negative Bacteremia  - Klebsiella growing in 2/2 sets sent on 9/20  -repeat blood cultures sent on 9/21 and are pending  - remains in with significant leukocytosis but in the setting of underlying MDS  - Please obtain a CT of the pelvic/prostate area to r/o abscess    Antibiotics to be broadened to Meropenem 1 gram IV q 8hr pending ID and sensitivity of the organism  Will also add pro-biotic    Kane Mendez MD  [phone]  After 5pm/weekends [phone] ASSESSMENT:  69/M with PMH myeloproliferative disorder and essential thrombocytosis, HTN/HLD, anemia. came to the ED c/o fever s/p prostate bx on 9/17 w/ Dr. Roman. A/w dysuria, chills/rigors, myalgia and mild headache. ID team consulted for antibiotic recommendations  --------------  Gram negative Bacteremia  - Klebsiella growing in 2/2 sets sent on 9/20  -repeat blood cultures sent on 9/21 and are pending  - remains in with significant leukocytosis but in the setting of underlying MDS  - If the bacteremia does not clear will obtain a CT of the pelvic/prostate area to r/o abscess    Antibiotics to be broadened to Meropenem 1 gram IV q 8hr pending ID and sensitivity of the organism  Will also add pro-biotic    Kane Mendez MD  [phone]  After 5pm/weekends [phone]

## 2019-09-23 LAB
-  AMIKACIN: SIGNIFICANT CHANGE UP
-  AMPICILLIN/SULBACTAM: SIGNIFICANT CHANGE UP
-  AMPICILLIN: SIGNIFICANT CHANGE UP
-  AZTREONAM: SIGNIFICANT CHANGE UP
-  CEFAZOLIN: SIGNIFICANT CHANGE UP
-  CEFEPIME: SIGNIFICANT CHANGE UP
-  CEFOXITIN: SIGNIFICANT CHANGE UP
-  CEFTRIAXONE: SIGNIFICANT CHANGE UP
-  CIPROFLOXACIN: SIGNIFICANT CHANGE UP
-  ERTAPENEM: SIGNIFICANT CHANGE UP
-  GENTAMICIN: SIGNIFICANT CHANGE UP
-  IMIPENEM: SIGNIFICANT CHANGE UP
-  LEVOFLOXACIN: SIGNIFICANT CHANGE UP
-  MEROPENEM: SIGNIFICANT CHANGE UP
-  PIPERACILLIN/TAZOBACTAM: SIGNIFICANT CHANGE UP
-  TOBRAMYCIN: SIGNIFICANT CHANGE UP
-  TRIMETHOPRIM/SULFAMETHOXAZOLE: SIGNIFICANT CHANGE UP
ANION GAP SERPL CALC-SCNC: 14 MMOL/L — SIGNIFICANT CHANGE UP (ref 5–17)
BASOPHILS # BLD AUTO: 0.19 K/UL — SIGNIFICANT CHANGE UP (ref 0–0.2)
BASOPHILS NFR BLD AUTO: 0.9 % — SIGNIFICANT CHANGE UP (ref 0–2)
BUN SERPL-MCNC: 16 MG/DL — SIGNIFICANT CHANGE UP (ref 7–23)
CALCIUM SERPL-MCNC: 9.3 MG/DL — SIGNIFICANT CHANGE UP (ref 8.4–10.5)
CHLORIDE SERPL-SCNC: 104 MMOL/L — SIGNIFICANT CHANGE UP (ref 96–108)
CO2 SERPL-SCNC: 24 MMOL/L — SIGNIFICANT CHANGE UP (ref 22–31)
CREAT SERPL-MCNC: 0.93 MG/DL — SIGNIFICANT CHANGE UP (ref 0.5–1.3)
CULTURE RESULTS: SIGNIFICANT CHANGE UP
CULTURE RESULTS: SIGNIFICANT CHANGE UP
EOSINOPHIL # BLD AUTO: 0 K/UL — SIGNIFICANT CHANGE UP (ref 0–0.5)
EOSINOPHIL NFR BLD AUTO: 0 % — SIGNIFICANT CHANGE UP (ref 0–6)
GLUCOSE SERPL-MCNC: 192 MG/DL — HIGH (ref 70–99)
HCT VFR BLD CALC: 31.2 % — LOW (ref 39–50)
HGB BLD-MCNC: 10.2 G/DL — LOW (ref 13–17)
LYMPHOCYTES # BLD AUTO: 0.8 K/UL — LOW (ref 1–3.3)
LYMPHOCYTES # BLD AUTO: 3.7 % — LOW (ref 13–44)
MCHC RBC-ENTMCNC: 29.6 PG — SIGNIFICANT CHANGE UP (ref 27–34)
MCHC RBC-ENTMCNC: 32.7 GM/DL — SIGNIFICANT CHANGE UP (ref 32–36)
MCV RBC AUTO: 90.4 FL — SIGNIFICANT CHANGE UP (ref 80–100)
METHOD TYPE: SIGNIFICANT CHANGE UP
MONOCYTES # BLD AUTO: 1.58 K/UL — HIGH (ref 0–0.9)
MONOCYTES NFR BLD AUTO: 7.3 % — SIGNIFICANT CHANGE UP (ref 2–14)
NEUTROPHILS # BLD AUTO: 16.49 K/UL — HIGH (ref 1.8–7.4)
NEUTROPHILS NFR BLD AUTO: 71.6 % — SIGNIFICANT CHANGE UP (ref 43–77)
ORGANISM # SPEC MICROSCOPIC CNT: SIGNIFICANT CHANGE UP
PLATELET # BLD AUTO: 269 K/UL — SIGNIFICANT CHANGE UP (ref 150–400)
POTASSIUM SERPL-MCNC: 3.6 MMOL/L — SIGNIFICANT CHANGE UP (ref 3.5–5.3)
POTASSIUM SERPL-SCNC: 3.6 MMOL/L — SIGNIFICANT CHANGE UP (ref 3.5–5.3)
RBC # BLD: 3.45 M/UL — LOW (ref 4.2–5.8)
RBC # FLD: 16.1 % — HIGH (ref 10.3–14.5)
SODIUM SERPL-SCNC: 142 MMOL/L — SIGNIFICANT CHANGE UP (ref 135–145)
SPECIMEN SOURCE: SIGNIFICANT CHANGE UP
SPECIMEN SOURCE: SIGNIFICANT CHANGE UP
WBC # BLD: 21.64 K/UL — HIGH (ref 3.8–10.5)
WBC # FLD AUTO: 21.64 K/UL — HIGH (ref 3.8–10.5)

## 2019-09-23 PROCEDURE — 99232 SBSQ HOSP IP/OBS MODERATE 35: CPT

## 2019-09-23 RX ORDER — ERTAPENEM SODIUM 1 G/1
1000 INJECTION, POWDER, LYOPHILIZED, FOR SOLUTION INTRAMUSCULAR; INTRAVENOUS EVERY 24 HOURS
Refills: 0 | Status: DISCONTINUED | OUTPATIENT
Start: 2019-09-23 | End: 2019-09-27

## 2019-09-23 RX ADMIN — Medication 300 MILLIGRAM(S): at 05:37

## 2019-09-23 RX ADMIN — ATORVASTATIN CALCIUM 20 MILLIGRAM(S): 80 TABLET, FILM COATED ORAL at 05:37

## 2019-09-23 RX ADMIN — VALSARTAN 160 MILLIGRAM(S): 80 TABLET ORAL at 05:37

## 2019-09-23 RX ADMIN — MEROPENEM 100 MILLIGRAM(S): 1 INJECTION INTRAVENOUS at 05:38

## 2019-09-23 RX ADMIN — HEPARIN SODIUM 5000 UNIT(S): 5000 INJECTION INTRAVENOUS; SUBCUTANEOUS at 21:33

## 2019-09-23 RX ADMIN — TAMSULOSIN HYDROCHLORIDE 0.4 MILLIGRAM(S): 0.4 CAPSULE ORAL at 08:46

## 2019-09-23 RX ADMIN — HEPARIN SODIUM 5000 UNIT(S): 5000 INJECTION INTRAVENOUS; SUBCUTANEOUS at 05:37

## 2019-09-23 RX ADMIN — Medication 1 TABLET(S): at 12:13

## 2019-09-23 RX ADMIN — PANTOPRAZOLE SODIUM 40 MILLIGRAM(S): 20 TABLET, DELAYED RELEASE ORAL at 06:06

## 2019-09-23 RX ADMIN — Medication 200 MILLIGRAM(S): at 22:29

## 2019-09-23 RX ADMIN — ERTAPENEM SODIUM 120 MILLIGRAM(S): 1 INJECTION, POWDER, LYOPHILIZED, FOR SOLUTION INTRAMUSCULAR; INTRAVENOUS at 16:13

## 2019-09-23 RX ADMIN — HEPARIN SODIUM 5000 UNIT(S): 5000 INJECTION INTRAVENOUS; SUBCUTANEOUS at 16:14

## 2019-09-23 RX ADMIN — Medication 200 MILLIGRAM(S): at 05:40

## 2019-09-23 RX ADMIN — Medication 600 MILLIGRAM(S): at 17:46

## 2019-09-23 RX ADMIN — Medication 600 MILLIGRAM(S): at 18:16

## 2019-09-23 RX ADMIN — Medication 600 MILLIGRAM(S): at 05:38

## 2019-09-23 RX ADMIN — Medication 1 TABLET(S): at 08:46

## 2019-09-23 RX ADMIN — Medication 81 MILLIGRAM(S): at 05:37

## 2019-09-23 RX ADMIN — Medication 1 TABLET(S): at 17:47

## 2019-09-23 NOTE — PROGRESS NOTE ADULT - SUBJECTIVE AND OBJECTIVE BOX
Subjective    Patient seen and examined. No overnight events. Remains with some tachycardia and hypoxia, mostly while asleep. Using IS. Denies CP/SOB. Stable urinary symptoms.    Objective    Vital signs  T(F): , Max: 99.6 (09-22-19 @ 20:44)  HR: 105 (09-23-19 @ 05:10)  BP: 130/80 (09-23-19 @ 05:10)  SpO2: 92% (09-23-19 @ 05:10)  Wt(kg): --    Output     09-22 @ 07:01  -  09-23 @ 07:00  --------------------------------------------------------  IN: 1300 mL / OUT: 1800 mL / NET: -500 mL        General: NAD  Abdomen: soft/non-tender/non-distended      Labs      09-22 @ 09:38    WBC 22.90 / Hct 32.6  / SCr --       09-21 @ 10:53    WBC 16.37 / Hct 38.0  / SCr --         Urine Cx: Culture - Urine (09.20.19 @ 01:08)    Specimen Source: .Urine    Culture Results:   <10,000 CFU/mL Normal Urogenital Esther      Blood Cx: Culture - Blood (09.20.19 @ 01:21)    Gram Stain:   Growth in aerobic bottle: Gram Negative Rods  Growth in anaerobic bottle: Gram Negative Rods    -  Amikacin: S <=16    -  Ampicillin: R >16 These ampicillin results predict results for amoxicillin    -  Ampicillin/Sulbactam: R >16/8 Enterobacter, Citrobacter, and Serratia may develop resistance during prolonged therapy (3-4 days)    -  Aztreonam: I 16    -  Cefazolin: R >16 Enterobacter, Citrobacter, and Serratia may develop resistance during prolonged therapy (3-4 days)    -  Cefepime: S <=2    -  Cefoxitin: R >16    -  Ceftriaxone: R 16 Enterobacter, Citrobacter, and Serratia may develop resistance during prolonged therapy    -  Ciprofloxacin: S <=1    -  Ertapenem: S <=0.5    -  Gentamicin: S <=2    -  Imipenem: S <=1    -  Levofloxacin: S <=2    -  Meropenem: S <=1    -  Piperacillin/Tazobactam: S 16    -  Tobramycin: S <=2    -  Trimethoprim/Sulfamethoxazole: S <=2/38    -  Klebsiella pneumoniae: Detec    Specimen Source: .Blood    Organism: Blood Culture PCR    Organism: Klebsiella pneumoniae    Culture Results:   Growth in aerobic and anaerobic bottles: Klebsiella pneumoniae  "Due to technical problems, Proteus sp. will Not be reported as part of  the BCID panel until further notice"  ***Blood Panel PCR results on this specimen are available  approximately 3 hours after the Gram stain result.***  Gram stain, PCR, and/or culture results may not always  correspond due to difference in methodologies.  ************************************************************  This PCR assay was performed using to be.  The following targets are tested for: Enterococcus,  vancomycin resistant enterococci, Listeria monocytogenes,  coagulase negative staphylococci, S. aureus,  methicillin resistant S. aureus, Streptococcus agalactiae  (Group B), S. pneumoniae, S. pyogenes (Group A),  Acinetobacter baumannii, Enterobacter cloacae, E. coli,  Klebsiella oxytoca, K. pneumoniae, Proteus sp.,  Serratia marcescens, Haemophilus influenzae,  Neisseria meningitidis, Pseudomonas aeruginosa, Candida  albicans, C. glabrata, C krusei, C parapsilosis,  C. tropicalis and the KPC resistance gene.    Organism Identification: Blood Culture PCR  Klebsiella pneumoniae    Method Type: PCR    Method Type: KIN        Imaging

## 2019-09-23 NOTE — PROGRESS NOTE ADULT - ASSESSMENT
69M with fevers s/p prostate bx  --continue meropenem, multiple PO options, f/up ID regarding choice and duration  --CBC this AM  --f/up repeat blood cultures  -- Out of bed, pain control, incentive spirometry, DVT prophylaxis   -- trend HR, 02  -- dispo planning

## 2019-09-23 NOTE — PROGRESS NOTE ADULT - SUBJECTIVE AND OBJECTIVE BOX
CC: F/U for GN Bacteremia    Saw/spoke to patient. No fevers, no chills. No new complaints. Unchanged.    Allergies  codeine (Pruritus; Other)    ANTIMICROBIALS:  meropenem  IVPB 1000 every 8 hours    PE:    Vital Signs Last 24 Hrs  T(C): 36.6 (23 Sep 2019 13:33), Max: 37.6 (22 Sep 2019 20:44)  T(F): 97.9 (23 Sep 2019 13:33), Max: 99.6 (22 Sep 2019 20:44)  HR: 107 (23 Sep 2019 13:33) (104 - 121)  BP: 122/83 (23 Sep 2019 13:33) (107/69 - 137/77)  RR: 18 (23 Sep 2019 13:33) (18 - 18)  SpO2: 93% (23 Sep 2019 13:33) (90% - 93%)    Gen: AOx3, NAD, non-toxic, pleasant  CV: S1+S2 normal, nontachycardic  Resp: Clear bilat, no resp distress, no crackles/wheezes  Abd: Soft, nontender, +BS  Ext: No LE edema, no wounds    LABS:                        10.2   21.64 )-----------( 269      ( 23 Sep 2019 09:03 )             31.2     09-23    142  |  104  |  16  ----------------------------<  192<H>  3.6   |  24  |  0.93    Ca    9.3      23 Sep 2019 12:55    MICROBIOLOGY:    .Blood  09-21-19   No growth to date    .Blood  09-20-19   Growth in aerobic and anaerobic bottles: Klebsiella pneumoniae  See previous culture [id number]  --  Blood Culture PCR  Klebsiella pneumoniae    (otherwise reviewed)    RADIOLOGY:    6/19 CXR:    FINDINGS:  The lungs are clear.  There are no pleural effusions or pneumothorax.  The cardiomediastinal silhouette is within normal limits.    IMPRESSION:   Clear lungs.

## 2019-09-23 NOTE — PROGRESS NOTE ADULT - ASSESSMENT
Impression: Definitely improving from gm negative sepsis. Fever is down, respiratory status is better, still with tachycardia    Plan: Antibiotics as per ID.           Repeat CBC and would get BCP on next blood work

## 2019-09-23 NOTE — PROGRESS NOTE ADULT - SUBJECTIVE AND OBJECTIVE BOX
BE MCKEON 69y Male    The patient is a Patient is a 69y old  Male who presents with a chief complaint of fever s/p prostate biopsy (22 Sep 2019 19:50)  Patient remains afebrile, feeling better but still weak. Pulmonary note appreciated and respiratory status is better, patient still with tachycardia.  Appreciate input from ID with antibiotic coverage  Repeat blood culture thus far is negative. Sensitivities to original blood culture still pending       Vital Signs Last 24 Hrs  T(C): 36.9 (23 Sep 2019 05:10), Max: 37.6 (22 Sep 2019 20:44)  T(F): 98.5 (23 Sep 2019 05:10), Max: 99.6 (22 Sep 2019 20:44)  HR: 105 (23 Sep 2019 05:10) (98 - 121)  BP: 130/80 (23 Sep 2019 05:10) (107/69 - 137/77)  BP(mean): --  RR: 18 (23 Sep 2019 05:10) (18 - 18)  SpO2: 92% (23 Sep 2019 05:10) (90% - 96%)    acetaminophen   Tablet .. 650 milliGRAM(s) Oral every 6 hours PRN  allopurinol 300 milliGRAM(s) Oral daily  aspirin enteric coated 81 milliGRAM(s) Oral daily  atorvastatin 20 milliGRAM(s) Oral at bedtime  bisacodyl 5 milliGRAM(s) Oral every 12 hours PRN  cyclobenzaprine 10 milliGRAM(s) Oral three times a day PRN  guaiFENesin    Syrup 200 milliGRAM(s) Oral every 6 hours PRN  heparin  Injectable 5000 Unit(s) SubCutaneous every 8 hours  ibuprofen  Tablet. 600 milliGRAM(s) Oral every 8 hours PRN  lactobacillus acidophilus 1 Tablet(s) Oral three times a day with meals  meropenem  IVPB 1000 milliGRAM(s) IV Intermittent every 8 hours  pantoprazole    Tablet 40 milliGRAM(s) Oral before breakfast  senna 2 Tablet(s) Oral at bedtime PRN  tamsulosin 0.4 milliGRAM(s) Oral every 24 hours  valsartan 160 milliGRAM(s) Oral daily          Physical exam:      Urine:     I&O's Summary    22 Sep 2019 07:01  -  23 Sep 2019 07:00  --------------------------------------------------------  IN: 1300 mL / OUT: 1800 mL / NET: -500 mL        LABS:                        10.2   22.90 )-----------( 226      ( 22 Sep 2019 09:38 )             32.6             Urine Culture:       Radiology    Prior notes/chart reviewed.

## 2019-09-23 NOTE — PROGRESS NOTE ADULT - ASSESSMENT
70 yo M PMhx of myeloproliferative disorder. came to the ED c/o fever s/p prostate bx on 9/17, post biopsy pt feel well until this afternoon when pt began to have dysuria, chills/rigors, myalgia and mild headache.  Fever, leukocytosis  Recent prostate biopsy  BCX K pne S Erta; repeat BCXs clear  Patient well but still leukocytosis  Concern for prostatitis with associated K pne bacteremia    Overall, Gram negative bacteremia, fever, leukocytosis, sepsis, prostatitis  - Erta 1g q 24  - DC Kym  - F/U pending BCXs  - Check CT A/P  - Trend WBCs, monitor clinically    Lincoln Peñaloza MD  Pager [phone]  After 5pm and on weekends call [phone] 68 yo M PMhx of myeloproliferative disorder. came to the ED c/o fever s/p prostate bx on 9/17, post biopsy pt feel well until this afternoon when pt began to have dysuria, chills/rigors, myalgia and mild headache.  Fever, leukocytosis  Recent prostate biopsy  BCX K pne S Erta; repeat BCXs clear  Patient well but still leukocytosis  Concern for prostatitis with associated K pne bacteremia  Overall, Gram negative bacteremia, fever, leukocytosis, sepsis, prostatitis  - Erta 1g q 24  - F/U pending BCXs  - Trend WBCs, monitor clinically    Lincoln Peñaloza MD  Pager [phone]  After 5pm and on weekends call [phone]

## 2019-09-24 LAB
ANISOCYTOSIS BLD QL: SLIGHT — SIGNIFICANT CHANGE UP
BASOPHILS # BLD AUTO: 0.1 K/UL — SIGNIFICANT CHANGE UP (ref 0–0.2)
BASOPHILS NFR BLD AUTO: 0 % — SIGNIFICANT CHANGE UP (ref 0–2)
BURR CELLS BLD QL SMEAR: PRESENT — SIGNIFICANT CHANGE UP
DACRYOCYTES BLD QL SMEAR: SLIGHT — SIGNIFICANT CHANGE UP
DOHLE BOD BLD QL SMEAR: PRESENT — SIGNIFICANT CHANGE UP
ELLIPTOCYTES BLD QL SMEAR: SLIGHT — SIGNIFICANT CHANGE UP
EOSINOPHIL # BLD AUTO: 0.1 K/UL — SIGNIFICANT CHANGE UP (ref 0–0.5)
EOSINOPHIL NFR BLD AUTO: 0 % — SIGNIFICANT CHANGE UP (ref 0–6)
HCT VFR BLD CALC: 31.7 % — LOW (ref 39–50)
HGB BLD-MCNC: 9.7 G/DL — LOW (ref 13–17)
LG PLATELETS BLD QL AUTO: SLIGHT — SIGNIFICANT CHANGE UP
LYMPHOCYTES # BLD AUTO: 1.6 K/UL — SIGNIFICANT CHANGE UP (ref 1–3.3)
LYMPHOCYTES # BLD AUTO: 5 % — LOW (ref 13–44)
MCHC RBC-ENTMCNC: 28.2 PG — SIGNIFICANT CHANGE UP (ref 27–34)
MCHC RBC-ENTMCNC: 30.7 GM/DL — LOW (ref 32–36)
MCV RBC AUTO: 91.9 FL — SIGNIFICANT CHANGE UP (ref 80–100)
METAMYELOCYTES # FLD: 3 % — HIGH (ref 0–0)
MONOCYTES # BLD AUTO: 4.1 K/UL — HIGH (ref 0–0.9)
MONOCYTES NFR BLD AUTO: 17 % — HIGH (ref 2–14)
MYELOCYTES NFR BLD: 9 % — HIGH (ref 0–0)
NEUTROPHILS # BLD AUTO: 18.5 K/UL — HIGH (ref 1.8–7.4)
NEUTROPHILS NFR BLD AUTO: 61 % — SIGNIFICANT CHANGE UP (ref 43–77)
NEUTS BAND # BLD: 3 % — SIGNIFICANT CHANGE UP (ref 0–8)
OVALOCYTES BLD QL SMEAR: SLIGHT — SIGNIFICANT CHANGE UP
PLAT MORPH BLD: NORMAL — SIGNIFICANT CHANGE UP
PLATELET # BLD AUTO: 302 K/UL — SIGNIFICANT CHANGE UP (ref 150–400)
POIKILOCYTOSIS BLD QL AUTO: SLIGHT — SIGNIFICANT CHANGE UP
POLYCHROMASIA BLD QL SMEAR: SLIGHT — SIGNIFICANT CHANGE UP
PROMYELOCYTES # FLD: 2 % — HIGH (ref 0–0)
RBC # BLD: 3.45 M/UL — LOW (ref 4.2–5.8)
RBC # FLD: 14.9 % — HIGH (ref 10.3–14.5)
RBC BLD AUTO: ABNORMAL
SMUDGE CELLS # BLD: PRESENT — SIGNIFICANT CHANGE UP
WBC # BLD: 24.4 K/UL — HIGH (ref 3.8–10.5)
WBC # FLD AUTO: 24.4 K/UL — HIGH (ref 3.8–10.5)

## 2019-09-24 PROCEDURE — 99232 SBSQ HOSP IP/OBS MODERATE 35: CPT | Mod: GC

## 2019-09-24 PROCEDURE — 99232 SBSQ HOSP IP/OBS MODERATE 35: CPT

## 2019-09-24 PROCEDURE — 71045 X-RAY EXAM CHEST 1 VIEW: CPT | Mod: 26

## 2019-09-24 RX ORDER — LORATADINE 10 MG/1
10 TABLET ORAL ONCE
Refills: 0 | Status: COMPLETED | OUTPATIENT
Start: 2019-09-24 | End: 2019-09-24

## 2019-09-24 RX ADMIN — Medication 600 MILLIGRAM(S): at 17:44

## 2019-09-24 RX ADMIN — ERTAPENEM SODIUM 120 MILLIGRAM(S): 1 INJECTION, POWDER, LYOPHILIZED, FOR SOLUTION INTRAMUSCULAR; INTRAVENOUS at 14:29

## 2019-09-24 RX ADMIN — Medication 600 MILLIGRAM(S): at 06:03

## 2019-09-24 RX ADMIN — Medication 81 MILLIGRAM(S): at 05:25

## 2019-09-24 RX ADMIN — HEPARIN SODIUM 5000 UNIT(S): 5000 INJECTION INTRAVENOUS; SUBCUTANEOUS at 05:26

## 2019-09-24 RX ADMIN — LORATADINE 10 MILLIGRAM(S): 10 TABLET ORAL at 01:17

## 2019-09-24 RX ADMIN — Medication 300 MILLIGRAM(S): at 05:26

## 2019-09-24 RX ADMIN — PANTOPRAZOLE SODIUM 40 MILLIGRAM(S): 20 TABLET, DELAYED RELEASE ORAL at 07:23

## 2019-09-24 RX ADMIN — HEPARIN SODIUM 5000 UNIT(S): 5000 INJECTION INTRAVENOUS; SUBCUTANEOUS at 21:52

## 2019-09-24 RX ADMIN — Medication 200 MILLIGRAM(S): at 17:13

## 2019-09-24 RX ADMIN — Medication 5 MILLIGRAM(S): at 01:17

## 2019-09-24 RX ADMIN — Medication 1 TABLET(S): at 07:23

## 2019-09-24 RX ADMIN — Medication 600 MILLIGRAM(S): at 05:33

## 2019-09-24 RX ADMIN — ATORVASTATIN CALCIUM 20 MILLIGRAM(S): 80 TABLET, FILM COATED ORAL at 05:25

## 2019-09-24 RX ADMIN — HEPARIN SODIUM 5000 UNIT(S): 5000 INJECTION INTRAVENOUS; SUBCUTANEOUS at 14:29

## 2019-09-24 RX ADMIN — Medication 1 TABLET(S): at 17:14

## 2019-09-24 RX ADMIN — TAMSULOSIN HYDROCHLORIDE 0.4 MILLIGRAM(S): 0.4 CAPSULE ORAL at 07:23

## 2019-09-24 RX ADMIN — Medication 600 MILLIGRAM(S): at 17:14

## 2019-09-24 RX ADMIN — VALSARTAN 160 MILLIGRAM(S): 80 TABLET ORAL at 05:49

## 2019-09-24 NOTE — PROGRESS NOTE ADULT - ATTENDING COMMENTS
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 Patient is treated for acute prostatitis by urology, ID following. His leukocytosis is multifactorial secondary to acute infection and underlying bone marrow disorder.

## 2019-09-24 NOTE — PROGRESS NOTE ADULT - SUBJECTIVE AND OBJECTIVE BOX
UROLOGY PA PROGRESS NOTE:     Subjective:  no acute events overnight. denies any sob. feeling better overall, urination improved.     Objective:  Vital signs  T(F): , Max: 99.2 (09-23-19 @ 09:48)  HR: 85 (09-24-19 @ 05:47)  BP: 125/77 (09-24-19 @ 05:47)  SpO2: 94% (09-24-19 @ 05:47)  Wt(kg): --    Output     09-23 @ 07:01  -  09-24 @ 07:00  --------------------------------------------------------  IN: 480 mL / OUT: 1700 mL / NET: -1220 mL        Physical Exam:  Gen:   Abd:   :    Labs:  09-24  24.4  / 31.7  /x      09-23  x     / x     /0.93                           9.7    24.4  )-----------( 302      ( 24 Sep 2019 07:35 )             31.7     09-23    142  |  104  |  16  ----------------------------<  192<H>  3.6   |  24  |  0.93    Ca    9.3      23 Sep 2019 12:55            Urine Cx:    Culture - Blood (collected 21 Sep 2019 10:43)  Source: .Blood  Preliminary Report (22 Sep 2019 11:00):    No growth to date.    Culture - Blood (collected 21 Sep 2019 10:43)  Source: .Blood  Preliminary Report (22 Sep 2019 11:00):    No growth to date. UROLOGY PA PROGRESS NOTE:     Subjective:  no acute events overnight. denies any sob. feeling better overall, urination improved. still requiring O2 overnight.     Objective:  Vital signs  T(F): , Max: 99.2 (09-23-19 @ 09:48)  HR: 85 (09-24-19 @ 05:47)  BP: 125/77 (09-24-19 @ 05:47)  SpO2: 94% (09-24-19 @ 05:47)  Wt(kg): --    Output     09-23 @ 07:01  -  09-24 @ 07:00  --------------------------------------------------------  IN: 480 mL / OUT: 1700 mL / NET: -1220 mL        Physical Exam:  Gen: NAD  Abd: soft, NT/ND  : voiding     Labs:  09-24  24.4  / 31.7  /x      09-23  x     / x     /0.93                           9.7    24.4  )-----------( 302      ( 24 Sep 2019 07:35 )             31.7     09-23    142  |  104  |  16  ----------------------------<  192<H>  3.6   |  24  |  0.93    Ca    9.3      23 Sep 2019 12:55            Urine Cx:    Culture - Blood (collected 21 Sep 2019 10:43)  Source: .Blood  Preliminary Report (22 Sep 2019 11:00):    No growth to date.    Culture - Blood (collected 21 Sep 2019 10:43)  Source: .Blood  Preliminary Report (22 Sep 2019 11:00):    No growth to date.

## 2019-09-24 NOTE — PROGRESS NOTE ADULT - ASSESSMENT
Impression: Urosepsis with Klebsiella secondary to Klebsiella. Patient was on correct antibiotics at time of sepsis, immunocompromised with resultant sepsis. He is now greatly improved    Plan: Final disposition as per ID. I would like to see his WBC count coing down.          Decision to be made how long to treat with IV antibiotics before converting to PO. He will need 4 weeks of antibiotic coverage

## 2019-09-24 NOTE — PROGRESS NOTE ADULT - SUBJECTIVE AND OBJECTIVE BOX
CC: F/U for Bacteremia    Saw/spoke to patient. No fevers, no chills. No new complaints. Unchanged.    Allergies  codeine (Pruritus; Other)    ANTIMICROBIALS:  ertapenem  IVPB 1000 every 24 hours    PE:    Vital Signs Last 24 Hrs  T(C): 36.5 (24 Sep 2019 09:27), Max: 36.7 (23 Sep 2019 17:25)  T(F): 97.7 (24 Sep 2019 09:27), Max: 98.1 (23 Sep 2019 17:25)  HR: 84 (24 Sep 2019 09:27) (84 - 107)  BP: 128/81 (24 Sep 2019 09:27) (113/73 - 128/81)  RR: 16 (24 Sep 2019 09:27) (16 - 18)  SpO2: 95% (24 Sep 2019 09:27) (92% - 95%)    Gen: AOx3, NAD, non-toxic, pleasant  CV: S1+S2 normal, nontachycardic  Resp: Clear bilat, no resp distress, no crackles/wheezes  Abd: Soft, nontender, +BS  Ext: No LE edema, no wounds    LABS:                        9.7    24.4  )-----------( 302      ( 24 Sep 2019 07:35 )             31.7     09-23    142  |  104  |  16  ----------------------------<  192<H>  3.6   |  24  |  0.93    Ca    9.3      23 Sep 2019 12:55    MICROBIOLOGY:    .Blood  09-21-19   No growth to date.    .Blood  09-20-19   Growth in aerobic and anaerobic bottles: Klebsiella pneumoniae  See previous culture [id number]  --  Blood Culture PCR  Klebsiella pneumoniae    .Urine  09-20-19   <10,000 CFU/mL Normal Urogenital Esther     (otherwise reviewed)    RADIOLOGY:    9/22 CXR:    FINDINGS:  Lungs are clear bilaterally.   No focal airspace consolidation, pleural effusion or pneumothorax.   Cardiac silhouette is not adequately evaluated on this projection.  Mediastinal and hilar contours are unremarkable.   Visualized bony thorax demonstrates no gross abnormality.     IMPRESSION:   Clear lungs. No acute cardiopulmonary process.

## 2019-09-24 NOTE — PROGRESS NOTE ADULT - ASSESSMENT
70 yo man with no pulmonary history admitted for Klebsiella bacteremia after prostate biopsy. Pulmonary service consulted for mild hypoxemia attributed to probably atelectasis. Improved with incentive spirometer and ambulation. Now saturating 98% on 2 L of oxygen.     Recommendations:  - Continue incentive spirometer  - Encourage out of bed to chair and ambulation   - Wean off oxygen as tolerated    --------------------------------------------------------  [place], PGY-4  Pulmonary/Critical Care Fellow  Pager: [phone] (JITENDRA), [phone] (NS) 70 yo man with history of myeloproliferative disorder admitted for Klebsiella bacteremia after prostate biopsy. Pulmonary service consulted for mild hypoxemia attributed to probably atelectasis. Improved with incentive spirometer and ambulation. Now saturating 98% on 2 L of oxygen.     Recommendations:  - Continue incentive spirometer  - Encourage out of bed to chair and ambulation   - Wean off oxygen as tolerated  - DVT prophylaxis    --------------------------------------------------------  [place], PGY-4  Pulmonary/Critical Care Fellow  Pager: [phone] (LIKAYLEE), [phone] (NS) 68 yo man with history of myeloproliferative disorder admitted for Klebsiella bacteremia after prostate biopsy. Pulmonary service consulted for mild hypoxemia attributed to probably atelectasis. Improved with incentive spirometer and ambulation. Now saturating 98% on 2 L of oxygen.     Recommendations:  - Continue incentive spirometer  - Encourage out of bed to chair and ambulation   - Wean off oxygen as tolerated  - Check oxygen saturation during ambulation   - DVT prophylaxis    --------------------------------------------------------  [place], PGY-4  Pulmonary/Critical Care Fellow  Pager: [phone] ([facility]), [phone] (NS)

## 2019-09-24 NOTE — PROGRESS NOTE ADULT - ASSESSMENT
69M with fevers s/p prostate bx  --continue ertapenem, multiple PO options, f/up ID regarding choice and duration  --CBC this AM  --f/up repeat blood cultures  -- Out of bed, pain control, incentive spirometry, DVT prophylaxis   -- trend HR, 02  -- f/u pulm/ heme  -- dispo planning

## 2019-09-24 NOTE — PROGRESS NOTE ADULT - ASSESSMENT
pt 70 y/o M PMhx of secondary myelofibrosis , ET ( JAK2 negative, BCR ABL negative, MPL Exon 10+) presented to ER with pain with urination. Hematology consulted given underlying myelofibrosis.    Patient is treated for acute prostatitis by urology, ID following.      # Myelofibrosis  -Secondary myelofibrosis , ET (JAK2 negative, BCR ABL negative, MPL Exon 10+)   -His leukocytosis is multifactorial secondary to acute infection and underlying bone marrow disorder.  -No further intervention required other than treating the infection.  -Continue with daily aspirin.  -Needs follow up with Dr. Coker after discharge  -Monitor CBC with diff while inpatient.          Devorah Munson MD  PGY 4, Oncology/Hematology fellow  (P) [phone]  After 5pm, please contact on-call team.

## 2019-09-24 NOTE — PROGRESS NOTE ADULT - SUBJECTIVE AND OBJECTIVE BOX
Hematology/Oncology Follow-up    INTERVAL HPI/OVERNIGHT EVENTS:  Patient S&E at bedside. No o/n events, patient resting comfortably. No complaints at this time. Patient denies fever, chills, dizziness, weakness, CP, palpitations, SOB, cough, N/V/D/C, dysuria, changes in bowel movements, LE edema.    VITAL SIGNS:  T(F): 98.1 (09-24-19 @ 13:47)  HR: 90 (09-24-19 @ 13:47)  BP: 128/81 (09-24-19 @ 13:47)  RR: 16 (09-24-19 @ 13:47)  SpO2: 96% (09-24-19 @ 13:47)  Wt(kg): --    PHYSICAL EXAM:    Constitutional: AAOx3, NAD,   Eyes: PERRL, EOMI, sclera non-icteric  Neck: supple, no masses, no JVD  Respiratory: CTA b/l, good air entry b/l, no wheezing, rhonchi, rales, with normal respiratory effort and no intercostal retractions  Cardiovascular: RRR, normal S1S2, no M/R/G  Gastrointestinal: soft, NTND, no masses palpable, BS normal in all four quadrants, no HSM  Extremities:  no c/c/e  Neurological: Grossly intact  Skin: Normal temperature    MEDICATIONS  (STANDING):  allopurinol 300 milliGRAM(s) Oral daily  aspirin enteric coated 81 milliGRAM(s) Oral daily  atorvastatin 20 milliGRAM(s) Oral at bedtime  ertapenem  IVPB 1000 milliGRAM(s) IV Intermittent every 24 hours  heparin  Injectable 5000 Unit(s) SubCutaneous every 8 hours  lactobacillus acidophilus 1 Tablet(s) Oral three times a day with meals  pantoprazole    Tablet 40 milliGRAM(s) Oral before breakfast  tamsulosin 0.4 milliGRAM(s) Oral every 24 hours  valsartan 160 milliGRAM(s) Oral daily    MEDICATIONS  (PRN):  acetaminophen   Tablet .. 650 milliGRAM(s) Oral every 6 hours PRN Temp greater or equal to 38C (100.4F), Mild Pain (1 - 3)  bisacodyl 5 milliGRAM(s) Oral every 12 hours PRN Constipation  cyclobenzaprine 10 milliGRAM(s) Oral three times a day PRN Muscle Spasm  guaiFENesin    Syrup 200 milliGRAM(s) Oral every 6 hours PRN Cough  ibuprofen  Tablet. 600 milliGRAM(s) Oral every 8 hours PRN Mild Pain (1 - 3)  senna 2 Tablet(s) Oral at bedtime PRN Constipation      codeine (Pruritus; Other)      LABS:                        9.7    24.4  )-----------( 302      ( 24 Sep 2019 07:35 )             31.7     09-23    142  |  104  |  16  ----------------------------<  192<H>  3.6   |  24  |  0.93    Ca    9.3      23 Sep 2019 12:55             RADIOLOGY & ADDITIONAL TESTS:  Studies reviewed. Hematology/Oncology Follow-up    INTERVAL HPI/OVERNIGHT EVENTS:  Patient S&E at bedside. No o/n events, patient resting comfortably. Has mild SOB which is improving with O2 supplement.      VITAL SIGNS:  T(F): 98.1 (09-24-19 @ 13:47)  HR: 90 (09-24-19 @ 13:47)  BP: 128/81 (09-24-19 @ 13:47)  RR: 16 (09-24-19 @ 13:47)  SpO2: 96% (09-24-19 @ 13:47)  Wt(kg): --    PHYSICAL EXAM:    Constitutional: AAOx3, NAD   Eyes: PERRL, EOMI, sclera non-icteric  Neck: supple, no masses, no JVD  Respiratory: CTA b/l, good air entry b/l, no wheezing, rhonchi, rales, with normal respiratory effort and no intercostal retractions  Cardiovascular: RRR, normal S1S2, no M/R/G  Gastrointestinal: soft, NTND, no masses palpable, BS normal in all four quadrants, no HSM  Extremities: no c/c/e  Neurological: Grossly intact  Skin: Normal temperature    MEDICATIONS  (STANDING):  allopurinol 300 milliGRAM(s) Oral daily  aspirin enteric coated 81 milliGRAM(s) Oral daily  atorvastatin 20 milliGRAM(s) Oral at bedtime  ertapenem  IVPB 1000 milliGRAM(s) IV Intermittent every 24 hours  heparin  Injectable 5000 Unit(s) SubCutaneous every 8 hours  lactobacillus acidophilus 1 Tablet(s) Oral three times a day with meals  pantoprazole    Tablet 40 milliGRAM(s) Oral before breakfast  tamsulosin 0.4 milliGRAM(s) Oral every 24 hours  valsartan 160 milliGRAM(s) Oral daily    MEDICATIONS  (PRN):  acetaminophen   Tablet .. 650 milliGRAM(s) Oral every 6 hours PRN Temp greater or equal to 38C (100.4F), Mild Pain (1 - 3)  bisacodyl 5 milliGRAM(s) Oral every 12 hours PRN Constipation  cyclobenzaprine 10 milliGRAM(s) Oral three times a day PRN Muscle Spasm  guaiFENesin    Syrup 200 milliGRAM(s) Oral every 6 hours PRN Cough  ibuprofen  Tablet. 600 milliGRAM(s) Oral every 8 hours PRN Mild Pain (1 - 3)  senna 2 Tablet(s) Oral at bedtime PRN Constipation      codeine (Pruritus; Other)      LABS:                        9.7    24.4  )-----------( 302      ( 24 Sep 2019 07:35 )             31.7     09-23    142  |  104  |  16  ----------------------------<  192<H>  3.6   |  24  |  0.93    Ca    9.3      23 Sep 2019 12:55             RADIOLOGY & ADDITIONAL TESTS:  Studies reviewed.

## 2019-09-24 NOTE — PROGRESS NOTE ADULT - SUBJECTIVE AND OBJECTIVE BOX
INTERVAL EVENTS  No acute events overnight  Feels better  O2 saturation 98% on 2 L of oxygen through nasal cannula  Doing incentive spirometer and ambulating    OBJECTIVE    Vital Signs Last 24 Hrs  T(C): 36.5 (24 Sep 2019 09:27), Max: 37.3 (23 Sep 2019 09:48)  T(F): 97.7 (24 Sep 2019 09:27), Max: 99.2 (23 Sep 2019 09:48)  HR: 84 (24 Sep 2019 09:27) (84 - 107)  BP: 128/81 (24 Sep 2019 09:27) (113/73 - 128/81)  RR: 16 (24 Sep 2019 09:27) (16 - 18)  SpO2: 95% (24 Sep 2019 09:27) (92% - 95%)    I&O's Summary    23 Sep 2019 07:01  -  24 Sep 2019 07:00  --------------------------------------------------------  IN: 480 mL / OUT: 1700 mL / NET: -1220 mL    24 Sep 2019 07:01  -  24 Sep 2019 09:42  --------------------------------------------------------  IN: 360 mL / OUT: 200 mL / NET: 160 mL      PHYSICAL EXAM:  General: no acute distress  HEENT: normocephalic  Eyes: extraocular movements intact, pupils equal and reactive to light  Neck: supple  Respiratory: non labored breathing, lungs clear to auscultation  Cardiovascular: normal rate, regular rhythm  Gastrointestinal: abdomen soft, non tender, non distended  Extremities: no lower extremity edema  Neurological: alert, oriented, no focal deficits  Psychiatric: cooperative    MEDICATIONS  (STANDING):  allopurinol 300 milliGRAM(s) Oral daily  aspirin enteric coated 81 milliGRAM(s) Oral daily  atorvastatin 20 milliGRAM(s) Oral at bedtime  ertapenem  IVPB 1000 milliGRAM(s) IV Intermittent every 24 hours  heparin  Injectable 5000 Unit(s) SubCutaneous every 8 hours  lactobacillus acidophilus 1 Tablet(s) Oral three times a day with meals  pantoprazole    Tablet 40 milliGRAM(s) Oral before breakfast  tamsulosin 0.4 milliGRAM(s) Oral every 24 hours  valsartan 160 milliGRAM(s) Oral daily    MEDICATIONS  (PRN):  acetaminophen   Tablet .. 650 milliGRAM(s) Oral every 6 hours PRN Temp greater or equal to 38C (100.4F), Mild Pain (1 - 3)  bisacodyl 5 milliGRAM(s) Oral every 12 hours PRN Constipation  cyclobenzaprine 10 milliGRAM(s) Oral three times a day PRN Muscle Spasm  guaiFENesin    Syrup 200 milliGRAM(s) Oral every 6 hours PRN Cough  ibuprofen  Tablet. 600 milliGRAM(s) Oral every 8 hours PRN Mild Pain (1 - 3)  senna 2 Tablet(s) Oral at bedtime PRN Constipation    LABORATORY                        9.7    24.4  )-----------( 302      ( 24 Sep 2019 07:35 )             31.7     09-23    142  |  104  |  16  ----------------------------<  192<H>  3.6   |  24  |  0.93    Ca    9.3      23 Sep 2019 12:55    MICROBIOLOGY    Culture - Blood (collected 21 Sep 2019 10:43)  Source: .Blood  Preliminary Report (22 Sep 2019 11:00):    No growth to date.    Culture - Blood (collected 21 Sep 2019 10:43)  Source: .Blood  Preliminary Report (22 Sep 2019 11:00):    No growth to date.    RADIOLOGY    < from: Xray Chest 1 View- PORTABLE-Urgent (09.22.19 @ 11:37) >  Clear lungs. No acute cardiopulmonary process.    < end of copied text >

## 2019-09-24 NOTE — PROGRESS NOTE ADULT - ASSESSMENT
68 yo M PMhx of myeloproliferative disorder. came to the ED c/o fever s/p prostate bx on 9/17, post biopsy pt feel well until this afternoon when pt began to have dysuria, chills/rigors, myalgia and mild headache.  Fever, leukocytosis  Recent prostate biopsy  BCX K pne S Erta; repeat BCXs clear  Patient well but still leukocytosis  Concern for prostatitis with associated K pne bacteremia  Overall, Gram negative bacteremia, fever, leukocytosis, sepsis, prostatitis  - Erta 1g q 24  - F/U pending BCXs  - Trend WBCs, monitor clinically  - Unclear WBC 2/2 underlying heme disorder vs representative of underlying infection (patient very well appearing at bedside)--if worsening or nonimproving, consider CT to eval for bleeding, abscess, other foci infection    Lincoln Peñaloza MD  Pager [phone]  After 5pm and on weekends call [phone]

## 2019-09-24 NOTE — PROGRESS NOTE ADULT - ATTENDING COMMENTS
I have examined pt and agree with above exam and plan. Pt still requiring supplemental 02 due to atelectasis. Agree with PT consult, OOB to chair and ambulation. Encourage correct usage of incentive spirometry. I have examined pt and agree with above exam and plan. Pt still requiring supplemental 02  on exertion . ?due to atelectasis. Encourage ambulation.

## 2019-09-24 NOTE — PROGRESS NOTE ADULT - SUBJECTIVE AND OBJECTIVE BOX
BE MCKEON 69y Male    The patient is a Patient is a 69y old  Male who presents with a chief complaint of fever s/p prostate biopsy (23 Sep 2019 08:53)  Remains afebrile. Tachycardia is way down, O2 sat improved. WBC still elevated yesterday to 21.6  Sensitivities back, sensitive to ertapenem, but also several oral antibiotics. Definitely improved      Vital Signs Last 24 Hrs  T(C): 36.4 (24 Sep 2019 05:47), Max: 37.3 (23 Sep 2019 09:48)  T(F): 97.6 (24 Sep 2019 05:47), Max: 99.2 (23 Sep 2019 09:48)  HR: 85 (24 Sep 2019 05:47) (85 - 107)  BP: 125/77 (24 Sep 2019 05:47) (113/73 - 128/76)  BP(mean): --  RR: 18 (24 Sep 2019 05:47) (17 - 18)  SpO2: 94% (24 Sep 2019 05:47) (92% - 94%)    acetaminophen   Tablet .. 650 milliGRAM(s) Oral every 6 hours PRN  allopurinol 300 milliGRAM(s) Oral daily  aspirin enteric coated 81 milliGRAM(s) Oral daily  atorvastatin 20 milliGRAM(s) Oral at bedtime  bisacodyl 5 milliGRAM(s) Oral every 12 hours PRN  cyclobenzaprine 10 milliGRAM(s) Oral three times a day PRN  ertapenem  IVPB 1000 milliGRAM(s) IV Intermittent every 24 hours  guaiFENesin    Syrup 200 milliGRAM(s) Oral every 6 hours PRN  heparin  Injectable 5000 Unit(s) SubCutaneous every 8 hours  ibuprofen  Tablet. 600 milliGRAM(s) Oral every 8 hours PRN  lactobacillus acidophilus 1 Tablet(s) Oral three times a day with meals  pantoprazole    Tablet 40 milliGRAM(s) Oral before breakfast  senna 2 Tablet(s) Oral at bedtime PRN  tamsulosin 0.4 milliGRAM(s) Oral every 24 hours  valsartan 160 milliGRAM(s) Oral daily          Physical exam:      Urine:     I&O's Summary    23 Sep 2019 07:01  -  24 Sep 2019 07:00  --------------------------------------------------------  IN: 360 mL / OUT: 1700 mL / NET: -1340 mL        LABS:                        10.2   21.64 )-----------( 269      ( 23 Sep 2019 09:03 )             31.2     09-23    142  |  104  |  16  ----------------------------<  192<H>  3.6   |  24  |  0.93    Ca    9.3      23 Sep 2019 12:55        Urine Culture:       Radiology    Prior notes/chart reviewed.

## 2019-09-25 LAB
HCT VFR BLD CALC: 30.5 % — LOW (ref 39–50)
HGB BLD-MCNC: 9.8 G/DL — LOW (ref 13–17)
MCHC RBC-ENTMCNC: 29.4 PG — SIGNIFICANT CHANGE UP (ref 27–34)
MCHC RBC-ENTMCNC: 32.3 GM/DL — SIGNIFICANT CHANGE UP (ref 32–36)
MCV RBC AUTO: 91 FL — SIGNIFICANT CHANGE UP (ref 80–100)
PLATELET # BLD AUTO: 273 K/UL — SIGNIFICANT CHANGE UP (ref 150–400)
RBC # BLD: 3.35 M/UL — LOW (ref 4.2–5.8)
RBC # FLD: 15 % — HIGH (ref 10.3–14.5)
WBC # BLD: 28 K/UL — HIGH (ref 3.8–10.5)
WBC # FLD AUTO: 28 K/UL — HIGH (ref 3.8–10.5)

## 2019-09-25 PROCEDURE — 99232 SBSQ HOSP IP/OBS MODERATE 35: CPT

## 2019-09-25 PROCEDURE — 71275 CT ANGIOGRAPHY CHEST: CPT | Mod: 26

## 2019-09-25 PROCEDURE — 74177 CT ABD & PELVIS W/CONTRAST: CPT | Mod: 26

## 2019-09-25 PROCEDURE — 99232 SBSQ HOSP IP/OBS MODERATE 35: CPT | Mod: GC

## 2019-09-25 RX ADMIN — Medication 81 MILLIGRAM(S): at 05:59

## 2019-09-25 RX ADMIN — TAMSULOSIN HYDROCHLORIDE 0.4 MILLIGRAM(S): 0.4 CAPSULE ORAL at 09:44

## 2019-09-25 RX ADMIN — Medication 300 MILLIGRAM(S): at 05:59

## 2019-09-25 RX ADMIN — PANTOPRAZOLE SODIUM 40 MILLIGRAM(S): 20 TABLET, DELAYED RELEASE ORAL at 06:04

## 2019-09-25 RX ADMIN — ATORVASTATIN CALCIUM 20 MILLIGRAM(S): 80 TABLET, FILM COATED ORAL at 05:59

## 2019-09-25 RX ADMIN — ERTAPENEM SODIUM 120 MILLIGRAM(S): 1 INJECTION, POWDER, LYOPHILIZED, FOR SOLUTION INTRAMUSCULAR; INTRAVENOUS at 14:25

## 2019-09-25 RX ADMIN — VALSARTAN 160 MILLIGRAM(S): 80 TABLET ORAL at 05:59

## 2019-09-25 RX ADMIN — HEPARIN SODIUM 5000 UNIT(S): 5000 INJECTION INTRAVENOUS; SUBCUTANEOUS at 05:59

## 2019-09-25 RX ADMIN — HEPARIN SODIUM 5000 UNIT(S): 5000 INJECTION INTRAVENOUS; SUBCUTANEOUS at 14:25

## 2019-09-25 RX ADMIN — Medication 600 MILLIGRAM(S): at 23:04

## 2019-09-25 RX ADMIN — Medication 600 MILLIGRAM(S): at 22:34

## 2019-09-25 RX ADMIN — Medication 200 MILLIGRAM(S): at 22:33

## 2019-09-25 RX ADMIN — Medication 1 TABLET(S): at 14:25

## 2019-09-25 RX ADMIN — Medication 1 TABLET(S): at 09:44

## 2019-09-25 RX ADMIN — HEPARIN SODIUM 5000 UNIT(S): 5000 INJECTION INTRAVENOUS; SUBCUTANEOUS at 22:33

## 2019-09-25 NOTE — PROGRESS NOTE ADULT - SUBJECTIVE AND OBJECTIVE BOX
CC: F/U for Leukocytosis    Saw/spoke to patient. No fevers, no chills. No new complaints. No other new symptoms.    Allergies  codeine (Pruritus; Other)    ANTIMICROBIALS:  ertapenem  IVPB 1000 every 24 hours    PE:    Vital Signs Last 24 Hrs  T(C): 36.8 (25 Sep 2019 09:25), Max: 36.8 (25 Sep 2019 06:15)  T(F): 98.3 (25 Sep 2019 09:25), Max: 98.3 (25 Sep 2019 09:25)  HR: 88 (25 Sep 2019 09:25) (88 - 97)  BP: 105/59 (25 Sep 2019 09:25) (105/59 - 128/81)  RR: 18 (25 Sep 2019 09:25) (16 - 18)  SpO2: 93% (25 Sep 2019 09:25) (92% - 98%)    Gen: AOx3, NAD, non-toxic, pleasant  CV: S1+S2 normal, nontachycardic  Resp: Clear bilat, no resp distress, no crackles/wheezes  Abd: Soft, nontender, +BS  Ext: No LE edema, no wounds    LABS:                        9.8    28.0  )-----------( 273      ( 25 Sep 2019 07:19 )             30.5     09-23    142  |  104  |  16  ----------------------------<  192<H>  3.6   |  24  |  0.93    Ca    9.3      23 Sep 2019 12:55    MICROBIOLOGY:    .Blood  09-21-19   No growth to date.    .Blood  09-20-19   Growth in aerobic and anaerobic bottles: Klebsiella pneumoniae  See previous culture [id number]  --  Blood Culture PCR  Klebsiella pneumoniae    .Urine  09-20-19   <10,000 CFU/mL Normal Urogenital Esther    RADIOLOGY:    9/24 CXR:    FINDINGS:  Heart size is normal.    The lungs are clear. No pneumothorax or pleural effusions.    Visualized osseous structures are unremarkable.    IMPRESSION: Clear lungs.

## 2019-09-25 NOTE — PROGRESS NOTE ADULT - ASSESSMENT
70 yo man with history of myeloproliferative disorder admitted for Klebsiella bacteremia after prostate biopsy. Pulmonary service consulted for mild hypoxemia attributed to probably atelectasis, however noticed hypoxia during exertion so would rule out pulmonary embolism.     Recommendations:  - Continue incentive spirometer  - Encourage out of bed to chair and ambulation   - Wean off oxygen as tolerated  - DVT prophylaxis  - CT chest angiogram to rule out PE     --------------------------------------------------------  Rio Dewey, PGY-4  Pulmonary/Critical Care Fellow  Pager: [phone] (LIKAYLEE), [phone] (NS)

## 2019-09-25 NOTE — PROGRESS NOTE ADULT - ATTENDING COMMENTS
I have examined pt and agree with above exam and plan. Etiology of 02 sats unclear. May be atelectasis. .Pt with symptoms of  dyspnea in exertion. Agree with CT Chest. If CT chest normal ? cardiac cause. ? ischemia while walking.

## 2019-09-25 NOTE — PROGRESS NOTE ADULT - ASSESSMENT
70 yo M PMhx of myeloproliferative disorder. came to the ED c/o fever s/p prostate bx on 9/17, post biopsy pt feel well until this afternoon when pt began to have dysuria, chills/rigors, myalgia and mild headache.  Fever, leukocytosis  Recent prostate biopsy  BCX K pne S Erta; repeat BCXs clear  Patient well but still leukocytosis, worsening  Concern for prostatitis with associated K pne bacteremia; workup for alternate causes leukocytosis  Overall, Gram negative bacteremia, fever, leukocytosis, sepsis, prostatitis  - Erta 1g q 24  - F/U pending BCXs  - Trend WBCs, monitor clinically  - Check CT A/P  - F/U Heme Onc  - Discussed case with  attending    Pager [phone]  After 5pm and on weekends call [phone]

## 2019-09-25 NOTE — PROGRESS NOTE ADULT - SUBJECTIVE AND OBJECTIVE BOX
UROLOGY PROGRESS NOTE:     Subjective: Patient seen and examined at bedside. no events overnight      Objective:  Vital signs  T(F): , Max: 98.2 (09-25-19 @ 06:15)  HR: 94 (09-25-19 @ 06:15)  BP: 123/74 (09-25-19 @ 06:15)  SpO2: 98% (09-25-19 @ 06:15)    Output     I&O's Detail    24 Sep 2019 07:01  -  25 Sep 2019 07:00  --------------------------------------------------------  IN:    Oral Fluid: 1620 mL  Total IN: 1620 mL    OUT:    Voided: 1625 mL  Total OUT: 1625 mL    Total NET: -5 mL          Physical Exam:  Gen: no acute distress  Back: no CVAT b/l  Abd: soft   : wnl    Labs:                        9.8    28.0  )-----------( 273      ( 25 Sep 2019 07:19 )             30.5     09-23    142  |  104  |  16  ----------------------------<  192<H>  3.6   |  24  |  0.93    Ca    9.3      23 Sep 2019 12:55            Urine Cx:

## 2019-09-25 NOTE — PROGRESS NOTE ADULT - ASSESSMENT
69M with fevers s/p prostate bx    - continue ertapenem,  - follow up infectious disease  - check CBC  - f/u pulmonary   - f/u hematology  - discharge planning 69M with fevers s/p prostate bx    - continue ertapenem,  - follow up infectious disease  - check CBC  - f/u pulmonary   - f/u hematology  - discharge planning    Attending Note (8:30AM) - Patient is actually feeling better. He was told to put the O2 back on last night for a drop in his O2 sat and I was told this occurred once during the day. There is no recording of this in the flow sheet, and I am not sure whether is still part of his sepsis syndrome or is his normal routine. Clinical picture of patient improving does not fit with the numbers obtained                                          In addition, he remains afebrile and his repeat blood culture is negative, however his WBC count is now up to 28,000. I am not sure if this is hematological or also related to his infection.                                    Plan: Need pulmonary to reassess, and let us know if he will need O2 at home, or should we keep him until his O2 sat stabilizes                                           Hematology to return and tell us if he needs further w/u                                            ID to let us know regarding antibiotic management                                            Since his WBC count went back up, it is now time to get a CT scan to be sure there is no abscess

## 2019-09-25 NOTE — PROGRESS NOTE ADULT - SUBJECTIVE AND OBJECTIVE BOX
INTERVAL EVENTS  No acute events overnight  Primary team reported episodes of hypoxia during ambulation  On room air at rest 94-95%  6 min hallway walk done - O2 saturation dropped to 88-90% on room air    OBJECTIVE    Vital Signs Last 24 Hrs  T(C): 36.9 (25 Sep 2019 17:20), Max: 37.5 (25 Sep 2019 13:40)  T(F): 98.4 (25 Sep 2019 17:20), Max: 99.5 (25 Sep 2019 13:40)  HR: 82 (25 Sep 2019 17:20) (82 - 97)  BP: 124/76 (25 Sep 2019 17:20) (105/59 - 125/70)  BP(mean): --  RR: 18 (25 Sep 2019 17:20) (16 - 18)  SpO2: 97% (25 Sep 2019 17:20) (92% - 98%)    PHYSICAL EXAM:  General: no acute distress  HEENT: normocephalic  Eyes: extraocular movements intact, pupils equal and reactive to light  Neck: supple  Respiratory: non labored breathing, lungs clear to auscultation  Cardiovascular: normal rate, regular rhythm  Gastrointestinal: abdomen soft, non tender, non distended  Extremities: no lower extremity edema  Neurological: alert, oriented, no focal deficits  Psychiatric: cooperative    MEDICATIONS  (STANDING):  allopurinol 300 milliGRAM(s) Oral daily  aspirin enteric coated 81 milliGRAM(s) Oral daily  atorvastatin 20 milliGRAM(s) Oral at bedtime  ertapenem  IVPB 1000 milliGRAM(s) IV Intermittent every 24 hours  heparin  Injectable 5000 Unit(s) SubCutaneous every 8 hours  lactobacillus acidophilus 1 Tablet(s) Oral three times a day with meals  pantoprazole    Tablet 40 milliGRAM(s) Oral before breakfast  tamsulosin 0.4 milliGRAM(s) Oral every 24 hours  valsartan 160 milliGRAM(s) Oral daily    MEDICATIONS  (PRN):  acetaminophen   Tablet .. 650 milliGRAM(s) Oral every 6 hours PRN Temp greater or equal to 38C (100.4F), Mild Pain (1 - 3)  bisacodyl 5 milliGRAM(s) Oral every 12 hours PRN Constipation  cyclobenzaprine 10 milliGRAM(s) Oral three times a day PRN Muscle Spasm  guaiFENesin    Syrup 200 milliGRAM(s) Oral every 6 hours PRN Cough  ibuprofen  Tablet. 600 milliGRAM(s) Oral every 8 hours PRN Mild Pain (1 - 3)  senna 2 Tablet(s) Oral at bedtime PRN Constipation    LABORATORY                                         9.8    28.0  )-----------( 273      ( 25 Sep 2019 07:19 )             30.5       RADIOLOGY    < from: Xray Chest 1 View- PORTABLE-Urgent (09.22.19 @ 11:37) >  Clear lungs. No acute cardiopulmonary process.    < end of copied text >

## 2019-09-26 LAB
ANION GAP SERPL CALC-SCNC: 14 MMOL/L — SIGNIFICANT CHANGE UP (ref 5–17)
BASOPHILS # BLD AUTO: 0.1 K/UL — SIGNIFICANT CHANGE UP (ref 0–0.2)
BASOPHILS NFR BLD AUTO: 0 % — SIGNIFICANT CHANGE UP (ref 0–2)
BUN SERPL-MCNC: 13 MG/DL — SIGNIFICANT CHANGE UP (ref 7–23)
CALCIUM SERPL-MCNC: 8.3 MG/DL — LOW (ref 8.4–10.5)
CHLORIDE SERPL-SCNC: 105 MMOL/L — SIGNIFICANT CHANGE UP (ref 96–108)
CO2 SERPL-SCNC: 26 MMOL/L — SIGNIFICANT CHANGE UP (ref 22–31)
CREAT SERPL-MCNC: 0.88 MG/DL — SIGNIFICANT CHANGE UP (ref 0.5–1.3)
CULTURE RESULTS: SIGNIFICANT CHANGE UP
CULTURE RESULTS: SIGNIFICANT CHANGE UP
EOSINOPHIL # BLD AUTO: 0.1 K/UL — SIGNIFICANT CHANGE UP (ref 0–0.5)
EOSINOPHIL NFR BLD AUTO: 0 % — SIGNIFICANT CHANGE UP (ref 0–6)
GLUCOSE SERPL-MCNC: 110 MG/DL — HIGH (ref 70–99)
HCT VFR BLD CALC: 31.5 % — LOW (ref 39–50)
HGB BLD-MCNC: 9.8 G/DL — LOW (ref 13–17)
LYMPHOCYTES # BLD AUTO: 2.1 K/UL — SIGNIFICANT CHANGE UP (ref 1–3.3)
LYMPHOCYTES # BLD AUTO: 7 % — LOW (ref 13–44)
MCHC RBC-ENTMCNC: 28.7 PG — SIGNIFICANT CHANGE UP (ref 27–34)
MCHC RBC-ENTMCNC: 31.3 GM/DL — LOW (ref 32–36)
MCV RBC AUTO: 91.7 FL — SIGNIFICANT CHANGE UP (ref 80–100)
METAMYELOCYTES # FLD: 2 % — HIGH (ref 0–0)
MONOCYTES # BLD AUTO: 4 K/UL — HIGH (ref 0–0.9)
MONOCYTES NFR BLD AUTO: 18 % — HIGH (ref 2–14)
MYELOCYTES NFR BLD: 5 % — HIGH (ref 0–0)
NEUTROPHILS # BLD AUTO: 20.2 K/UL — HIGH (ref 1.8–7.4)
NEUTROPHILS NFR BLD AUTO: 56 % — SIGNIFICANT CHANGE UP (ref 43–77)
NEUTS BAND # BLD: 11 % — HIGH (ref 0–8)
PLAT MORPH BLD: NORMAL — SIGNIFICANT CHANGE UP
PLATELET # BLD AUTO: 249 K/UL — SIGNIFICANT CHANGE UP (ref 150–400)
POTASSIUM SERPL-MCNC: 3.4 MMOL/L — LOW (ref 3.5–5.3)
POTASSIUM SERPL-SCNC: 3.4 MMOL/L — LOW (ref 3.5–5.3)
PROMYELOCYTES # FLD: 1 % — HIGH (ref 0–0)
RBC # BLD: 3.43 M/UL — LOW (ref 4.2–5.8)
RBC # FLD: 15 % — HIGH (ref 10.3–14.5)
RBC BLD AUTO: SIGNIFICANT CHANGE UP
SODIUM SERPL-SCNC: 145 MMOL/L — SIGNIFICANT CHANGE UP (ref 135–145)
SPECIMEN SOURCE: SIGNIFICANT CHANGE UP
SPECIMEN SOURCE: SIGNIFICANT CHANGE UP
WBC # BLD: 26.6 K/UL — HIGH (ref 3.8–10.5)
WBC # FLD AUTO: 26.6 K/UL — HIGH (ref 3.8–10.5)

## 2019-09-26 PROCEDURE — 99232 SBSQ HOSP IP/OBS MODERATE 35: CPT | Mod: GC

## 2019-09-26 PROCEDURE — 99232 SBSQ HOSP IP/OBS MODERATE 35: CPT

## 2019-09-26 RX ORDER — POTASSIUM CHLORIDE 20 MEQ
10 PACKET (EA) ORAL
Refills: 0 | Status: COMPLETED | OUTPATIENT
Start: 2019-09-26 | End: 2019-09-26

## 2019-09-26 RX ADMIN — Medication 600 MILLIGRAM(S): at 07:15

## 2019-09-26 RX ADMIN — Medication 81 MILLIGRAM(S): at 06:44

## 2019-09-26 RX ADMIN — TAMSULOSIN HYDROCHLORIDE 0.4 MILLIGRAM(S): 0.4 CAPSULE ORAL at 10:31

## 2019-09-26 RX ADMIN — Medication 200 MILLIGRAM(S): at 06:45

## 2019-09-26 RX ADMIN — Medication 300 MILLIGRAM(S): at 06:45

## 2019-09-26 RX ADMIN — Medication 100 MILLIEQUIVALENT(S): at 12:44

## 2019-09-26 RX ADMIN — Medication 100 MILLIEQUIVALENT(S): at 16:30

## 2019-09-26 RX ADMIN — Medication 600 MILLIGRAM(S): at 22:45

## 2019-09-26 RX ADMIN — Medication 200 MILLIGRAM(S): at 22:39

## 2019-09-26 RX ADMIN — Medication 1 TABLET(S): at 10:31

## 2019-09-26 RX ADMIN — HEPARIN SODIUM 5000 UNIT(S): 5000 INJECTION INTRAVENOUS; SUBCUTANEOUS at 22:39

## 2019-09-26 RX ADMIN — Medication 600 MILLIGRAM(S): at 23:15

## 2019-09-26 RX ADMIN — PANTOPRAZOLE SODIUM 40 MILLIGRAM(S): 20 TABLET, DELAYED RELEASE ORAL at 06:45

## 2019-09-26 RX ADMIN — Medication 1 TABLET(S): at 16:33

## 2019-09-26 RX ADMIN — HEPARIN SODIUM 5000 UNIT(S): 5000 INJECTION INTRAVENOUS; SUBCUTANEOUS at 06:44

## 2019-09-26 RX ADMIN — VALSARTAN 160 MILLIGRAM(S): 80 TABLET ORAL at 06:44

## 2019-09-26 RX ADMIN — ATORVASTATIN CALCIUM 20 MILLIGRAM(S): 80 TABLET, FILM COATED ORAL at 06:44

## 2019-09-26 RX ADMIN — Medication 600 MILLIGRAM(S): at 06:44

## 2019-09-26 RX ADMIN — Medication 100 MILLIEQUIVALENT(S): at 14:34

## 2019-09-26 RX ADMIN — HEPARIN SODIUM 5000 UNIT(S): 5000 INJECTION INTRAVENOUS; SUBCUTANEOUS at 15:45

## 2019-09-26 RX ADMIN — ERTAPENEM SODIUM 120 MILLIGRAM(S): 1 INJECTION, POWDER, LYOPHILIZED, FOR SOLUTION INTRAMUSCULAR; INTRAVENOUS at 15:45

## 2019-09-26 NOTE — PROGRESS NOTE ADULT - SUBJECTIVE AND OBJECTIVE BOX
The patient was seen and examined at bedside.  Denies complaints of chest pain, shortness of breath, nausea, acute pain.    T(C): 36.6 (09-26-19 @ 05:52), Max: 37.5 (09-25-19 @ 13:40)  HR: 68 (09-26-19 @ 05:52) (68 - 92)  BP: 130/79 (09-26-19 @ 05:52) (105/59 - 140/77)  RR: 18 (09-26-19 @ 05:52) (18 - 18)  SpO2: 96% (09-26-19 @ 05:52) (93% - 97%)  Wt(kg): --    Physical Exam:    General: NAD, A+Ox3  Abdomen: soft, non-tender, non-distended        09-25 @ 07:01  -  09-26 @ 07:00  --------------------------------------------------------  IN: 820 mL / OUT: 1775 mL / NET: -955 mL    void - 400cc                            9.8    26.6  )-----------( 249               31.5 The patient was seen and examined at bedside.  Denies complaints of chest pain, shortness of breath, nausea, acute pain.  Preliminary on pelvic CT - prostatitis, no abscess  Preliminary on chest CT - no pulmonary embolus  WBC a little lower at 26.6 this AM  Patient is feeling OK. He stated that he has had some shortness of breath for a long time and usually ignores it    T(C): 36.6 (09-26-19 @ 05:52), Max: 37.5 (09-25-19 @ 13:40)  HR: 68 (09-26-19 @ 05:52) (68 - 92)  BP: 130/79 (09-26-19 @ 05:52) (105/59 - 140/77)  RR: 18 (09-26-19 @ 05:52) (18 - 18)  SpO2: 96% (09-26-19 @ 05:52) (93% - 97%)  Wt(kg): --    Physical Exam:    General: NAD, A+Ox3  Abdomen: soft, non-tender, non-distended        09-25 @ 07:01  -  09-26 @ 07:00  --------------------------------------------------------  IN: 820 mL / OUT: 1775 mL / NET: -955 mL    void - 400cc                            9.8    26.6  )-----------( 249               31.5

## 2019-09-26 NOTE — PROGRESS NOTE ADULT - ASSESSMENT
68 yo man with history of myeloproliferative disorder admitted for Klebsiella bacteremia after prostate biopsy. Pulmonary service consulted for mild hypoxemia attributed to probably atelectasis. However had episodes of hypoxia during ambulation. CT chest angiogram reviewed. No pulmonary embolism. No fluid overload. There is some ground glass opacities in BEATRICE which is non specific and would not cause hypoxia. Major etiologies have been ruled out and today he was saturating 94-95% on room air and with ambulation O2 saturation is mainly 90%, so no further inpatient work up needed at this time. He also reports chronic dyspnea on exertion and would recommend outpatient ischemic evaluation.    Recommendations:  - Continue incentive spirometer  - Encourage out of bed to chair and ambulation   - Wean off oxygen as tolerated  - DVT prophylaxis  - Outpatient PFTs and outpatient ischemic evaluation     May provide outpatient pulmonary follow up upon discharge at [address] [place] [address], [address] ([phone]).  Please e-mail [email or web address] to make an appointment for the patient.  Please include the name, , and a phone number for you and the patient.    --------------------------------------------------------  Rio Dewey, PGY-4  Pulmonary/Critical Care Fellow  Pager: [phone] (JITENDRA), [phone] (NS)

## 2019-09-26 NOTE — PROGRESS NOTE ADULT - ASSESSMENT
68 yo M PMhx of myeloproliferative disorder. came to the ED c/o fever s/p prostate bx on 9/17, post biopsy pt feel well until this afternoon when pt began to have dysuria, chills/rigors, myalgia and mild headache.  Fever, leukocytosis  Recent prostate biopsy  BCX K pne S Erta; repeat BCXs clear  Patient well but still leukocytosis, worsening  Concern for prostatitis with associated K pne bacteremia  CT reassuring, but follow up final read  WBC slightly trending down  Overall, Gram negative bacteremia, fever, leukocytosis, sepsis, prostatitis  - Erta 1g q 24  - Trend WBCs, monitor clinically  - F/U final CT reports  - F/U Heme Onc    Pager [phone]  After 5pm and on weekends call [phone]

## 2019-09-26 NOTE — PROVIDER CONTACT NOTE (OTHER) - RECOMMENDATIONS
repeat blood cx in am 9/21  zosyn iv as ordered
Notify MD.
Notify PA, come assess patient.
ok to discontinue potassium

## 2019-09-26 NOTE — PROGRESS NOTE ADULT - SUBJECTIVE AND OBJECTIVE BOX
INTERVAL HPI/OVERNIGHT EVENTS:  Patient S&E at bedside.   VITAL SIGNS:  T(F): 98.1 (09-26-19 @ 09:03)  HR: 72 (09-26-19 @ 09:03)  BP: 143/76 (09-26-19 @ 09:03)  RR: 18 (09-26-19 @ 09:03)  SpO2: 94% (09-26-19 @ 09:03)  Wt(kg): --    PHYSICAL EXAM:  Constitutional: NAD  Eyes: EOMI, sclera non-icteric  Neck: supple, no masses, no JVD  Respiratory: CTA b/l, good air entry b/l  Cardiovascular: RRR, no M/R/G  Gastrointestinal: soft, NTND, no masses palpable, + BS  Extremities: no c/c/e  Neurological: AAOx3      MEDICATIONS  (STANDING):  allopurinol 300 milliGRAM(s) Oral daily  aspirin enteric coated 81 milliGRAM(s) Oral daily  atorvastatin 20 milliGRAM(s) Oral at bedtime  ertapenem  IVPB 1000 milliGRAM(s) IV Intermittent every 24 hours  heparin  Injectable 5000 Unit(s) SubCutaneous every 8 hours  lactobacillus acidophilus 1 Tablet(s) Oral three times a day with meals  pantoprazole    Tablet 40 milliGRAM(s) Oral before breakfast  potassium chloride  10 mEq/100 mL IVPB 10 milliEquivalent(s) IV Intermittent every 1 hour  tamsulosin 0.4 milliGRAM(s) Oral every 24 hours  valsartan 160 milliGRAM(s) Oral daily    MEDICATIONS  (PRN):  acetaminophen   Tablet .. 650 milliGRAM(s) Oral every 6 hours PRN Temp greater or equal to 38C (100.4F), Mild Pain (1 - 3)  bisacodyl 5 milliGRAM(s) Oral every 12 hours PRN Constipation  cyclobenzaprine 10 milliGRAM(s) Oral three times a day PRN Muscle Spasm  guaiFENesin    Syrup 200 milliGRAM(s) Oral every 6 hours PRN Cough  ibuprofen  Tablet. 600 milliGRAM(s) Oral every 8 hours PRN Mild Pain (1 - 3)  senna 2 Tablet(s) Oral at bedtime PRN Constipation      Allergies    codeine (Pruritus; Other)    Intolerances        LABS:                        9.8    26.6  )-----------( 249      ( 26 Sep 2019 06:43 )             31.5     09-26    145  |  105  |  13  ----------------------------<  110<H>  3.4<L>   |  26  |  0.88    Ca    8.3<L>      26 Sep 2019 06:43            RADIOLOGY & ADDITIONAL TESTS:  Studies reviewed.    ASSESSMENT & PLAN: INTERVAL HPI/OVERNIGHT EVENTS:  Patient S&E at bedside.  Feels well.  Per patient, SOB and cough at baseline, states he has post-nasal drip.  Otherwise, no fevers, chills, abd pain, n/v/d, chest pain, LE swelling.    VITAL SIGNS:  T(F): 98.1 (09-26-19 @ 09:03)  HR: 72 (09-26-19 @ 09:03)  BP: 143/76 (09-26-19 @ 09:03)  RR: 18 (09-26-19 @ 09:03)  SpO2: 94% (09-26-19 @ 09:03)  Wt(kg): --    PHYSICAL EXAM:  Constitutional: NAD  Eyes: EOMI, sclera non-icteric  Neck: supple, no masses, no JVD  Respiratory: CTA b/l, good air entry b/l  Cardiovascular: RRR, no M/R/G  Gastrointestinal: soft, NTND, no masses palpable, + BS  Extremities: no c/c/e  Neurological: AAOx3      MEDICATIONS  (STANDING):  allopurinol 300 milliGRAM(s) Oral daily  aspirin enteric coated 81 milliGRAM(s) Oral daily  atorvastatin 20 milliGRAM(s) Oral at bedtime  ertapenem  IVPB 1000 milliGRAM(s) IV Intermittent every 24 hours  heparin  Injectable 5000 Unit(s) SubCutaneous every 8 hours  lactobacillus acidophilus 1 Tablet(s) Oral three times a day with meals  pantoprazole    Tablet 40 milliGRAM(s) Oral before breakfast  potassium chloride  10 mEq/100 mL IVPB 10 milliEquivalent(s) IV Intermittent every 1 hour  tamsulosin 0.4 milliGRAM(s) Oral every 24 hours  valsartan 160 milliGRAM(s) Oral daily    MEDICATIONS  (PRN):  acetaminophen   Tablet .. 650 milliGRAM(s) Oral every 6 hours PRN Temp greater or equal to 38C (100.4F), Mild Pain (1 - 3)  bisacodyl 5 milliGRAM(s) Oral every 12 hours PRN Constipation  cyclobenzaprine 10 milliGRAM(s) Oral three times a day PRN Muscle Spasm  guaiFENesin    Syrup 200 milliGRAM(s) Oral every 6 hours PRN Cough  ibuprofen  Tablet. 600 milliGRAM(s) Oral every 8 hours PRN Mild Pain (1 - 3)  senna 2 Tablet(s) Oral at bedtime PRN Constipation      Allergies  codeine (Pruritus; Other)    Intolerances        LABS:                        9.8    26.6  )-----------( 249      ( 26 Sep 2019 06:43 )             31.5     09-26    145  |  105  |  13  ----------------------------<  110<H>  3.4<L>   |  26  |  0.88    Ca    8.3<L>      26 Sep 2019 06:43            RADIOLOGY & ADDITIONAL TESTS:  Studies reviewed.    ASSESSMENT & PLAN:

## 2019-09-26 NOTE — PROGRESS NOTE ADULT - SUBJECTIVE AND OBJECTIVE BOX
INTERVAL EVENTS  No acute events overnight  CT chest angiogram negative for pulmonary embolism  Otherwise feels ok  Oxygen saturation on room air 94-95%    OBJECTIVE    Vital Signs Last 24 Hrs  T(C): 36.7 (26 Sep 2019 17:12), Max: 36.7 (25 Sep 2019 21:29)  T(F): 98.1 (26 Sep 2019 17:12), Max: 98.1 (25 Sep 2019 21:29)  HR: 76 (26 Sep 2019 17:12) (68 - 88)  BP: 138/69 (26 Sep 2019 17:12) (121/71 - 143/76)  BP(mean): --  RR: 18 (26 Sep 2019 17:12) (18 - 18)  SpO2: 96% (26 Sep 2019 17:12) (94% - 96%)    PHYSICAL EXAM:  General: no acute distress  HEENT: normocephalic  Eyes: extraocular movements intact, pupils equal and reactive to light  Neck: supple  Respiratory: non labored breathing, lungs clear to auscultation  Cardiovascular: normal rate, regular rhythm  Gastrointestinal: abdomen soft, non tender, non distended  Extremities: no lower extremity edema  Neurological: alert, oriented, no focal deficits  Psychiatric: cooperative    MEDICATIONS  (STANDING):  allopurinol 300 milliGRAM(s) Oral daily  aspirin enteric coated 81 milliGRAM(s) Oral daily  atorvastatin 20 milliGRAM(s) Oral at bedtime  ertapenem  IVPB 1000 milliGRAM(s) IV Intermittent every 24 hours  heparin  Injectable 5000 Unit(s) SubCutaneous every 8 hours  lactobacillus acidophilus 1 Tablet(s) Oral three times a day with meals  pantoprazole    Tablet 40 milliGRAM(s) Oral before breakfast  tamsulosin 0.4 milliGRAM(s) Oral every 24 hours  valsartan 160 milliGRAM(s) Oral daily    MEDICATIONS  (PRN):  acetaminophen   Tablet .. 650 milliGRAM(s) Oral every 6 hours PRN Temp greater or equal to 38C (100.4F), Mild Pain (1 - 3)  bisacodyl 5 milliGRAM(s) Oral every 12 hours PRN Constipation  cyclobenzaprine 10 milliGRAM(s) Oral three times a day PRN Muscle Spasm  guaiFENesin    Syrup 200 milliGRAM(s) Oral every 6 hours PRN Cough  ibuprofen  Tablet. 600 milliGRAM(s) Oral every 8 hours PRN Mild Pain (1 - 3)  senna 2 Tablet(s) Oral at bedtime PRN Constipation    LABORATORY                                                            9.8    26.6  )-----------( 249      ( 26 Sep 2019 06:43 )             31.5     09-26    145  |  105  |  13  ----------------------------<  110<H>  3.4<L>   |  26  |  0.88    Ca    8.3<L>      26 Sep 2019 06:43      RADIOLOGY    CT Angio Chest w/ IV Cont (09.25.19 @ 19:55) >  No pulmonary embolism. Nonspecific groundglass opacities within theleft   upper lobe.    Enlarged heterogeneous prostate. No fluid collections.      < end of copied text >

## 2019-09-26 NOTE — PROGRESS NOTE ADULT - SUBJECTIVE AND OBJECTIVE BOX
CC: F/U for Bacteremia    Saw/spoke to patient. No fevers, no chills. No new complaints. Unchanged.    Allergies  codeine (Pruritus; Other)    ANTIMICROBIALS:  ertapenem  IVPB 1000 every 24 hours    PE:    Vital Signs Last 24 Hrs  T(C): 36.7 (26 Sep 2019 09:03), Max: 37.5 (25 Sep 2019 13:40)  T(F): 98.1 (26 Sep 2019 09:03), Max: 99.5 (25 Sep 2019 13:40)  HR: 72 (26 Sep 2019 09:03) (68 - 92)  BP: 143/76 (26 Sep 2019 09:03) (120/75 - 143/76)  RR: 18 (26 Sep 2019 09:03) (18 - 18)  SpO2: 94% (26 Sep 2019 09:03) (94% - 97%)    Gen: AOx3, NAD, non-toxic, pleasant  CV: S1+S2 normal, nontachycardic  Resp: Clear bilat, no resp distress, no crackles/wheezes  Abd: Soft, nontender, +BS  Ext: No LE edema, no wounds    LABS:                        9.8    26.6  )-----------( 249      ( 26 Sep 2019 06:43 )             31.5     09-26    145  |  105  |  13  ----------------------------<  110<H>  3.4<L>   |  26  |  0.88    Ca    8.3<L>      26 Sep 2019 06:43    MICROBIOLOGY:    .Blood  09-21-19   No growth to date.    .Blood  09-20-19   Growth in aerobic and anaerobic bottles: Klebsiella pneumoniae  See previous culture [id number]  --  Blood Culture PCR  Klebsiella pneumoniae    .Urine  09-20-19   <10,000 CFU/mL Normal Urogenital Esther     RADIOLOGY:    9/25 CT: PRELIM    INTERPRETATION:  Enlarged heterogeneous prostate with adjacent fat   stranding, compatible with prostatitis. No prostatic or periprostatic   drainable fluid collection. Hepatosplenomegaly.

## 2019-09-26 NOTE — PROGRESS NOTE ADULT - ASSESSMENT
NOTE INCOMPLETE 70 y/o M PMhx of secondary myelofibrosis, ET ( JAK2 negative, BCR ABL negative, MPL Exon 10+) presented to ER with pain with urination. Hematology consulted given underlying myelofibrosis.  Patient is being treated for acute prostatitis by urology, ID following.    # Myelofibrosis  -Secondary myelofibrosis , ET (JAK2 negative, BCR ABL negative, MPL Exon 10+)   -His leukocytosis is multifactorial secondary to BM stress, acute infection and underlying bone marrow disorder.  Now trending down.  -No further intervention required other than treating the infection. Last fever on 9/20, repeat cultures NGTD.  CT chest with nonspecific ground glass opacities, pulmonary following.  Continue incentives spirometry.  Pt feels well.    -Continue with daily aspirin.  -Needs follow up with Dr. Coker after discharge for count check. Case discussed with Dr. Coker.  -Monitor CBC with diff while inpatient.    Kaelyn Lopez  Hematology Fellow  [phone]

## 2019-09-26 NOTE — PROGRESS NOTE ADULT - ASSESSMENT
69 year old male with fevers s/p prostate biopsy    -f/u AM BMP  -trend leukocytosis  -f/u CT chest final read  -f/u pulmonology recommendations 69 year old male with fevers s/p prostate biopsy    -f/u AM BMP  -trend leukocytosis  -f/u CT chest final read  -f/u pulmonology recommendations    Attending(8AM) - Sepsis has resolved, need to treat prostatitis for 4 weeks. Should do OK with PO Cipro unless ID would prefer to continue IV at home                             Awaiting pulmonary and hematology for further assessment and recommendations 69 year old male with fevers s/p prostate biopsy    -f/u AM BMP  -trend leukocytosis  -f/u CT chest final read  -f/u pulmonology recommendations  -f/u hematology recommendations  -wean oxygen requirements as tolerated    Attending(8AM) - Sepsis has resolved, need to treat prostatitis for 4 weeks. Should do OK with PO Cipro unless ID would prefer to continue IV at home                             Awaiting pulmonary and hematology for further assessment and recommendations

## 2019-09-26 NOTE — PROGRESS NOTE ADULT - ATTENDING COMMENTS
70 y/o M PMhx of secondary myelofibrosis, ET ( JAK2 negative, BCR ABL negative, MPL Exon 10+) presented to ER with pain with urination. Hematology consulted given underlying myelofibrosis.  Patient is being treated for acute prostatitis by urology. His leukocytosis is multifactorial secondary to BM stress, acute infection and underlying bone marrow disorder.  Now trending down. Continue with daily aspirin.

## 2019-09-26 NOTE — PROVIDER CONTACT NOTE (OTHER) - ASSESSMENT
VSS, pt stating that his IV is burning even with potassium at a slow rate, pt already tolerated 2 runs of IV potassium, potassium 3.4

## 2019-09-26 NOTE — PROVIDER CONTACT NOTE (OTHER) - ACTION/TREATMENT ORDERED:
PA stated to admin bolus as ordered and made aware Tylenol admin as ordered. PA to come to pt bedside w. team to assess pt. Pt safety maintained, will cont to monitor.
MD made aware; EKG to be ordered. No other interventions at this time. Pt remains on continuous pulse oximetry.
PA made aware, ok to have patient ambulate in hallway with supplemental O2, PA will come read EKG (EKG interpreted as sinus tachycardia). Pa will order Dulcolax tab for constipation. Will cont to mon.
ok to discontinue potassium

## 2019-09-26 NOTE — PROGRESS NOTE ADULT - ATTENDING COMMENTS
I have examined pt and agree with above exam and plan. Small ground glass infiltrates seen on Ct Chest should not be causing 02 desaturation on exertion. ? cardiac ischemia.

## 2019-09-27 ENCOUNTER — TRANSCRIPTION ENCOUNTER (OUTPATIENT)
Age: 69
End: 2019-09-27

## 2019-09-27 ENCOUNTER — INBOUND DOCUMENT (OUTPATIENT)
Age: 69
End: 2019-09-27

## 2019-09-27 VITALS
TEMPERATURE: 98 F | RESPIRATION RATE: 18 BRPM | OXYGEN SATURATION: 91 % | SYSTOLIC BLOOD PRESSURE: 148 MMHG | HEART RATE: 79 BPM | DIASTOLIC BLOOD PRESSURE: 84 MMHG

## 2019-09-27 LAB
ANION GAP SERPL CALC-SCNC: 9 MMOL/L — SIGNIFICANT CHANGE UP (ref 5–17)
BUN SERPL-MCNC: 14 MG/DL — SIGNIFICANT CHANGE UP (ref 7–23)
CALCIUM SERPL-MCNC: 8.6 MG/DL — SIGNIFICANT CHANGE UP (ref 8.4–10.5)
CHLORIDE SERPL-SCNC: 108 MMOL/L — SIGNIFICANT CHANGE UP (ref 96–108)
CO2 SERPL-SCNC: 26 MMOL/L — SIGNIFICANT CHANGE UP (ref 22–31)
CREAT SERPL-MCNC: 0.88 MG/DL — SIGNIFICANT CHANGE UP (ref 0.5–1.3)
GLUCOSE SERPL-MCNC: 106 MG/DL — HIGH (ref 70–99)
HCT VFR BLD CALC: 31.3 % — LOW (ref 39–50)
HGB BLD-MCNC: 9.7 G/DL — LOW (ref 13–17)
MCHC RBC-ENTMCNC: 28.1 PG — SIGNIFICANT CHANGE UP (ref 27–34)
MCHC RBC-ENTMCNC: 30.9 GM/DL — LOW (ref 32–36)
MCV RBC AUTO: 91 FL — SIGNIFICANT CHANGE UP (ref 80–100)
PLATELET # BLD AUTO: 218 K/UL — SIGNIFICANT CHANGE UP (ref 150–400)
POTASSIUM SERPL-MCNC: 3.6 MMOL/L — SIGNIFICANT CHANGE UP (ref 3.5–5.3)
POTASSIUM SERPL-SCNC: 3.6 MMOL/L — SIGNIFICANT CHANGE UP (ref 3.5–5.3)
RBC # BLD: 3.44 M/UL — LOW (ref 4.2–5.8)
RBC # FLD: 15 % — HIGH (ref 10.3–14.5)
SODIUM SERPL-SCNC: 143 MMOL/L — SIGNIFICANT CHANGE UP (ref 135–145)
WBC # BLD: 24.5 K/UL — HIGH (ref 3.8–10.5)
WBC # FLD AUTO: 24.5 K/UL — HIGH (ref 3.8–10.5)

## 2019-09-27 PROCEDURE — 71275 CT ANGIOGRAPHY CHEST: CPT

## 2019-09-27 PROCEDURE — 84132 ASSAY OF SERUM POTASSIUM: CPT

## 2019-09-27 PROCEDURE — 80053 COMPREHEN METABOLIC PANEL: CPT

## 2019-09-27 PROCEDURE — 96374 THER/PROPH/DIAG INJ IV PUSH: CPT

## 2019-09-27 PROCEDURE — 85014 HEMATOCRIT: CPT

## 2019-09-27 PROCEDURE — 82947 ASSAY GLUCOSE BLOOD QUANT: CPT

## 2019-09-27 PROCEDURE — 71046 X-RAY EXAM CHEST 2 VIEWS: CPT

## 2019-09-27 PROCEDURE — 81001 URINALYSIS AUTO W/SCOPE: CPT

## 2019-09-27 PROCEDURE — 87040 BLOOD CULTURE FOR BACTERIA: CPT

## 2019-09-27 PROCEDURE — 36430 TRANSFUSION BLD/BLD COMPNT: CPT

## 2019-09-27 PROCEDURE — 86803 HEPATITIS C AB TEST: CPT

## 2019-09-27 PROCEDURE — 99233 SBSQ HOSP IP/OBS HIGH 50: CPT

## 2019-09-27 PROCEDURE — 82435 ASSAY OF BLOOD CHLORIDE: CPT

## 2019-09-27 PROCEDURE — 87150 DNA/RNA AMPLIFIED PROBE: CPT

## 2019-09-27 PROCEDURE — 84295 ASSAY OF SERUM SODIUM: CPT

## 2019-09-27 PROCEDURE — 82330 ASSAY OF CALCIUM: CPT

## 2019-09-27 PROCEDURE — 71045 X-RAY EXAM CHEST 1 VIEW: CPT

## 2019-09-27 PROCEDURE — 74177 CT ABD & PELVIS W/CONTRAST: CPT

## 2019-09-27 PROCEDURE — 87186 SC STD MICRODIL/AGAR DIL: CPT

## 2019-09-27 PROCEDURE — 82803 BLOOD GASES ANY COMBINATION: CPT

## 2019-09-27 PROCEDURE — 83605 ASSAY OF LACTIC ACID: CPT

## 2019-09-27 PROCEDURE — 85027 COMPLETE CBC AUTOMATED: CPT

## 2019-09-27 PROCEDURE — 93005 ELECTROCARDIOGRAM TRACING: CPT

## 2019-09-27 PROCEDURE — 80048 BASIC METABOLIC PNL TOTAL CA: CPT

## 2019-09-27 PROCEDURE — 87086 URINE CULTURE/COLONY COUNT: CPT

## 2019-09-27 PROCEDURE — 99232 SBSQ HOSP IP/OBS MODERATE 35: CPT | Mod: GC

## 2019-09-27 PROCEDURE — 99285 EMERGENCY DEPT VISIT HI MDM: CPT | Mod: 25

## 2019-09-27 RX ORDER — IBUPROFEN 200 MG
1 TABLET ORAL
Qty: 0 | Refills: 0 | DISCHARGE
Start: 2019-09-27

## 2019-09-27 RX ORDER — MOXIFLOXACIN HYDROCHLORIDE TABLETS, 400 MG 400 MG/1
1 TABLET, FILM COATED ORAL
Qty: 56 | Refills: 0
Start: 2019-09-27 | End: 2019-10-24

## 2019-09-27 RX ORDER — ACETAMINOPHEN 500 MG
2 TABLET ORAL
Qty: 0 | Refills: 0 | DISCHARGE
Start: 2019-09-27

## 2019-09-27 RX ADMIN — Medication 300 MILLIGRAM(S): at 06:55

## 2019-09-27 RX ADMIN — Medication 81 MILLIGRAM(S): at 06:55

## 2019-09-27 RX ADMIN — Medication 600 MILLIGRAM(S): at 06:55

## 2019-09-27 RX ADMIN — PANTOPRAZOLE SODIUM 40 MILLIGRAM(S): 20 TABLET, DELAYED RELEASE ORAL at 06:55

## 2019-09-27 RX ADMIN — Medication 1 TABLET(S): at 07:53

## 2019-09-27 RX ADMIN — TAMSULOSIN HYDROCHLORIDE 0.4 MILLIGRAM(S): 0.4 CAPSULE ORAL at 07:53

## 2019-09-27 RX ADMIN — VALSARTAN 160 MILLIGRAM(S): 80 TABLET ORAL at 06:55

## 2019-09-27 RX ADMIN — Medication 200 MILLIGRAM(S): at 06:54

## 2019-09-27 RX ADMIN — Medication 600 MILLIGRAM(S): at 07:25

## 2019-09-27 RX ADMIN — ATORVASTATIN CALCIUM 20 MILLIGRAM(S): 80 TABLET, FILM COATED ORAL at 06:55

## 2019-09-27 RX ADMIN — HEPARIN SODIUM 5000 UNIT(S): 5000 INJECTION INTRAVENOUS; SUBCUTANEOUS at 06:55

## 2019-09-27 NOTE — PROGRESS NOTE ADULT - ASSESSMENT
69 year old male with fevers s/p prostate biopsy    -trend leukocytosis, f/u AM labs  -f/u hematology  -f/u pulmonology  -f/u infectious disease; will reconcile finalized antibiotic plan pending labs  -OOB/ambulate  -wean oxygen as tolerated 69 year old male with fevers s/p prostate biopsy    -f/u hematology  -f/u pulmonology  -f/u infectious disease; will reconcile finalized antibiotic plan  -OOB/ambulate  -wean oxygen as tolerated 69 year old male with fevers s/p prostate biopsy    -f/u hematology  -appreciate pulmonology recs, will arrange outpatient follow up  -f/u infectious disease; will reconcile finalized antibiotic plan  -OOB/ambulate  -wean oxygen as tolerated 69 year old male with fevers s/p prostate biopsy    -f/u hematology  -appreciate pulmonology recs, will arrange outpatient follow up  -f/u infectious disease; will reconcile finalized antibiotic plan  -OOB/ambulate  -wean oxygen as tolerated    Attending (7:30 AM) Patient doing much better and feels OK. WBC lower, still elevated at 24 but trending down. Ok for discharge today. Would elect for Cipro for 3 more weeks. WIll see in office next week

## 2019-09-27 NOTE — DISCHARGE NOTE PROVIDER - CARE PROVIDERS DIRECT ADDRESSES
,DirectAddress_Unknown,[email or web address] ,DirectAddress_Unknown,[email or web address],DirectAddress_Unknown ,DirectAddress_Unknown,[email or web address],DirectAddress_Unknown,[email or web address] ,[email or web address],[email or web address],DirectAddress_Unknown,[email or web address]

## 2019-09-27 NOTE — PROGRESS NOTE ADULT - PROVIDER SPECIALTY LIST ADULT
Heme/Onc
Heme/Onc
Infectious Disease
Pulmonology
Urology
Infectious Disease
Urology

## 2019-09-27 NOTE — DISCHARGE NOTE PROVIDER - NSDCACTIVITY_GEN_ALL_CORE
Walking - Outdoors allowed/Walking - Indoors allowed/No heavy lifting/straining/Showering allowed/Stairs allowed

## 2019-09-27 NOTE — PROGRESS NOTE ADULT - NSHPATTENDINGPLANDISCUSS_GEN_ALL_CORE
Urology PA
ID attending and Urology PA
Pulmonary fellow
Pulmonary fellow.
Urology PA
Pulmonary fellow.
Urology PA

## 2019-09-27 NOTE — DISCHARGE NOTE PROVIDER - PROVIDER TOKENS
PROVIDER:[TOKEN:[[id number]],FOLLOWUP:[1 week]],PROVIDER:[TOKEN:[[id number]],FOLLOWUP:[Routine]] PROVIDER:[TOKEN:[[id number]],FOLLOWUP:[1 week]],PROVIDER:[TOKEN:[[id number]],FOLLOWUP:[Routine]],FREE:[LAST:[Office],FIRST:[Pulmonology],PHONE:[phone]],FAX:[(   )    -],ADDRESS:[[address] [place] [address], [place] [address]],FOLLOWUP:[Routine]] PROVIDER:[TOKEN:[[id number]],FOLLOWUP:[1 week]],PROVIDER:[TOKEN:[[id number]],FOLLOWUP:[Routine]],FREE:[LAST:[Office],FIRST:[Pulmonology],PHONE:[phone]],FAX:[(   )    -],ADDRESS:[[address] [place] [address], [place] [address]],FOLLOWUP:[Routine]],PROVIDER:[TOKEN:[[id number]],FOLLOWUP:[Routine]] PROVIDER:[TOKEN:[[id number]],FOLLOWUP:[1 week]],PROVIDER:[TOKEN:[[id number]],FOLLOWUP:[Routine]],PROVIDER:[TOKEN:[[id number]],FOLLOWUP:[Routine]],FREE:[LAST:[Office],FIRST:[Pulmonology],PHONE:[phone]],FAX:[(   )    -],ADDRESS:[[address] [place] [address], [place] [address]],FOLLOWUP:[Routine]],PROVIDER:[TOKEN:[[id number]],FOLLOWUP:[1 month]]

## 2019-09-27 NOTE — DISCHARGE NOTE PROVIDER - NSDCCPCAREPLAN_GEN_ALL_CORE_FT
PRINCIPAL DISCHARGE DIAGNOSIS  Diagnosis: Fever  Assessment and Plan of Treatment: You were treated or fevers after a prostate biopsy and were sent home with antibiotics for a month.  Follow up with Dr Roman in office next week Wednesday, Thursday, or Friday.  Call the office to make an appointment.  Call the office if you develop fevers of 101F or greater.      SECONDARY DISCHARGE DIAGNOSES  Diagnosis: Myeloproliferative disorder  Assessment and Plan of Treatment: Follow up with Dr Coker routinely.    Diagnosis: Oxygen desaturation  Assessment and Plan of Treatment: You were evaluated by pulmonology for oxygen desaturation.  THey cleared you for discharge, but you have to follow up with them when able.  Also, please follow up with your primary care doctor and let them know that you need a check up for your heart and a stress test. PRINCIPAL DISCHARGE DIAGNOSIS  Diagnosis: Fever  Assessment and Plan of Treatment: You were treated or fevers after a prostate biopsy and were sent home with antibiotics for a month.  Follow up with Dr Roman in office next week Wednesday, Thursday, or Friday.  Call the office to make an appointment.  Call the office if you develop fevers of 101F or greater.      SECONDARY DISCHARGE DIAGNOSES  Diagnosis: Myeloproliferative disorder  Assessment and Plan of Treatment: Follow up with Dr Coker routinely.    Diagnosis: Oxygen desaturation  Assessment and Plan of Treatment: You were evaluated by pulmonology for oxygen desaturation.  They cleared you for discharge, but you have to follow up with them when able.  Also, please follow up with your primary care doctor and let them know that you need a check up for your heart and a stress test. PRINCIPAL DISCHARGE DIAGNOSIS  Diagnosis: Fever  Assessment and Plan of Treatment: You were treated or fevers after a prostate biopsy and were sent home with antibiotics for a month.  Follow up with Dr Roman in office next week Wednesday, Thursday, or Friday.  Call the office to make an appointment.  Call the office if you develop fevers of 101F or greater.      SECONDARY DISCHARGE DIAGNOSES  Diagnosis: Myeloproliferative disorder  Assessment and Plan of Treatment: Follow up with Dr Coker routinely.    Diagnosis: Oxygen desaturation  Assessment and Plan of Treatment: You were evaluated by pulmonology for oxygen desaturation.  They cleared you for discharge, but you have to follow up with them when able for pulmooary function testing.  Also, please follow up with your primary care doctor and let them know that you need a check up for your heart and a stress test. PRINCIPAL DISCHARGE DIAGNOSIS  Diagnosis: Fever  Assessment and Plan of Treatment: You were treated or fevers after a prostate biopsy and were sent home with antibiotics for a month.  Follow up with Dr Roman in office next week Wednesday, Thursday, or Friday.  Let Dr Roman's office know htat oyu need to have oyur white blood cell count drawn in the office.  Call the office to make an appointment.  Call the office if you develop fevers of 101F or greater.      SECONDARY DISCHARGE DIAGNOSES  Diagnosis: Myeloproliferative disorder  Assessment and Plan of Treatment: Follow up with Dr Coker routinely.    Diagnosis: Oxygen desaturation  Assessment and Plan of Treatment: You were evaluated by pulmonology for oxygen desaturation.  They cleared you for discharge, but you have to follow up with them when able for pulmooary function testing.  Also, please follow up with your primary care doctor and let them know that you need a check up for your heart and a stress test. PRINCIPAL DISCHARGE DIAGNOSIS  Diagnosis: Fever  Assessment and Plan of Treatment: You were treated or fevers after a prostate biopsy and were sent home with antibiotics for a month.  Follow up with Dr Roman in office next week Wednesday, Thursday, or Friday.  Let Dr Roman's office know htat oyu need to have oyur white blood cell count drawn in the office.  Call the office to make an appointment.  Call the office if you develop fevers of 101F or greater.      SECONDARY DISCHARGE DIAGNOSES  Diagnosis: Hyperlipidemia  Assessment and Plan of Treatment: Continue atorvastatin    Diagnosis: Gout  Assessment and Plan of Treatment: Continue colchicine and allopurinol    Diagnosis: Myeloproliferative disorder  Assessment and Plan of Treatment: Follow up with Dr Coker routinely.    Diagnosis: Oxygen desaturation  Assessment and Plan of Treatment: You were evaluated by pulmonology for oxygen desaturation.  They cleared you for discharge, but you have to follow up with them when able for pulmooary function testing.  Also, please follow up with your primary care doctor and let them know that you need a check up for your heart and a stress test.

## 2019-09-27 NOTE — PROGRESS NOTE ADULT - SUBJECTIVE AND OBJECTIVE BOX
CC: F/U for Bacteremia    Saw/spoke to patient. No fevers, no chills. Feels well. No diarrhea. Unchanged.    Allergies  codeine (Pruritus; Other)    ANTIMICROBIALS:  ertapenem  IVPB 1000 every 24 hours    PE:    Vital Signs Last 24 Hrs  T(C): 36.8 (27 Sep 2019 09:00), Max: 36.9 (26 Sep 2019 21:35)  T(F): 98.3 (27 Sep 2019 09:00), Max: 98.5 (26 Sep 2019 21:35)  HR: 79 (27 Sep 2019 09:00) (76 - 87)  BP: 148/84 (27 Sep 2019 09:00) (124/72 - 148/84)  RR: 18 (27 Sep 2019 09:00) (18 - 18)  SpO2: 91% (27 Sep 2019 09:00) (85% - 98%)    Gen: AOx3, NAD, non-toxic, pleasant  CV: S1+S2 normal, nontachycardic  Resp: Clear bilat, no resp distress, no crackles/wheezes  Abd: Soft, nontender, +BS  Ext: No LE edema, no wounds    LABS:                        9.7    24.5  )-----------( 218      ( 27 Sep 2019 05:40 )             31.3     09-27    143  |  108  |  14  ----------------------------<  106<H>  3.6   |  26  |  0.88    Ca    8.6      27 Sep 2019 05:40    MICROBIOLOGY:    .Blood  09-21-19   No growth at 5 days.     .Blood  09-20-19   Growth in aerobic and anaerobic bottles: Klebsiella pneumoniae  See previous culture [id number]  --  Blood Culture PCR  Klebsiella pneumoniae    .Urine  09-20-19   <10,000 CFU/mL Normal Urogenital Esther    (otherwise reviewed)    RADIOLOGY:    9/25 CT:    IMPRESSION:     No pulmonary embolism. Nonspecific groundglass opacities within the left   upper lobe.    Enlarged heterogeneous prostate. No fluid collections.

## 2019-09-27 NOTE — PROGRESS NOTE ADULT - SUBJECTIVE AND OBJECTIVE BOX
INTERVAL EVENTS  No acute events overnight      OBJECTIVE    Vital Signs Last 24 Hrs  T(C): 36.7 (27 Sep 2019 01:09), Max: 36.9 (26 Sep 2019 21:35)  T(F): 98.1 (27 Sep 2019 01:09), Max: 98.5 (26 Sep 2019 21:35)  HR: 78 (27 Sep 2019 01:09) (72 - 87)  BP: 128/75 (27 Sep 2019 01:09) (124/72 - 143/76)  BP(mean): --  RR: 18 (27 Sep 2019 01:09) (18 - 18)  SpO2: 85% (27 Sep 2019 01:22) (85% - 98%)    PHYSICAL EXAM:  General: no acute distress  HEENT: normocephalic  Eyes: extraocular movements intact, pupils equal and reactive to light  Neck: supple  Respiratory: non labored breathing, lungs clear to auscultation  Cardiovascular: normal rate, regular rhythm  Gastrointestinal: abdomen soft, non tender, non distended  Extremities: no lower extremity edema  Neurological: alert, oriented, no focal deficits  Psychiatric: cooperative    MEDICATIONS  (STANDING):  allopurinol 300 milliGRAM(s) Oral daily  aspirin enteric coated 81 milliGRAM(s) Oral daily  atorvastatin 20 milliGRAM(s) Oral at bedtime  ertapenem  IVPB 1000 milliGRAM(s) IV Intermittent every 24 hours  heparin  Injectable 5000 Unit(s) SubCutaneous every 8 hours  lactobacillus acidophilus 1 Tablet(s) Oral three times a day with meals  pantoprazole    Tablet 40 milliGRAM(s) Oral before breakfast  tamsulosin 0.4 milliGRAM(s) Oral every 24 hours  valsartan 160 milliGRAM(s) Oral daily    MEDICATIONS  (PRN):  acetaminophen   Tablet .. 650 milliGRAM(s) Oral every 6 hours PRN Temp greater or equal to 38C (100.4F), Mild Pain (1 - 3)  bisacodyl 5 milliGRAM(s) Oral every 12 hours PRN Constipation  cyclobenzaprine 10 milliGRAM(s) Oral three times a day PRN Muscle Spasm  guaiFENesin    Syrup 200 milliGRAM(s) Oral every 6 hours PRN Cough  ibuprofen  Tablet. 600 milliGRAM(s) Oral every 8 hours PRN Mild Pain (1 - 3)  senna 2 Tablet(s) Oral at bedtime PRN Constipation    LABORATORY                                                  9.7    24.5  )-----------( 218      ( 27 Sep 2019 05:40 )             31.3     09-27    143  |  108  |  14  ----------------------------<  106<H>  3.6   |  26  |  0.88    Ca    8.6      27 Sep 2019 05:40      RADIOLOGY    CT Angio Chest w/ IV Cont (09.25.19 @ 19:55) >  No pulmonary embolism. Nonspecific groundglass opacities within theleft   upper lobe.    Enlarged heterogeneous prostate. No fluid collections.      < end of copied text > INTERVAL EVENTS  No acute events overnight  Feels better  Saturating 97% on room air      OBJECTIVE    Vital Signs Last 24 Hrs  T(C): 36.7 (27 Sep 2019 01:09), Max: 36.9 (26 Sep 2019 21:35)  T(F): 98.1 (27 Sep 2019 01:09), Max: 98.5 (26 Sep 2019 21:35)  HR: 78 (27 Sep 2019 01:09) (72 - 87)  BP: 128/75 (27 Sep 2019 01:09) (124/72 - 143/76)  BP(mean): --  RR: 18 (27 Sep 2019 01:09) (18 - 18)  SpO2: 85% (27 Sep 2019 01:22) (85% - 98%)    PHYSICAL EXAM:  General: no acute distress  HEENT: normocephalic  Eyes: extraocular movements intact, pupils equal and reactive to light  Neck: supple  Respiratory: non labored breathing, lungs clear to auscultation  Cardiovascular: normal rate, regular rhythm  Gastrointestinal: abdomen soft, non tender, non distended  Extremities: no lower extremity edema  Neurological: alert, oriented, no focal deficits  Psychiatric: cooperative    MEDICATIONS  (STANDING):  allopurinol 300 milliGRAM(s) Oral daily  aspirin enteric coated 81 milliGRAM(s) Oral daily  atorvastatin 20 milliGRAM(s) Oral at bedtime  ertapenem  IVPB 1000 milliGRAM(s) IV Intermittent every 24 hours  heparin  Injectable 5000 Unit(s) SubCutaneous every 8 hours  lactobacillus acidophilus 1 Tablet(s) Oral three times a day with meals  pantoprazole    Tablet 40 milliGRAM(s) Oral before breakfast  tamsulosin 0.4 milliGRAM(s) Oral every 24 hours  valsartan 160 milliGRAM(s) Oral daily    MEDICATIONS  (PRN):  acetaminophen   Tablet .. 650 milliGRAM(s) Oral every 6 hours PRN Temp greater or equal to 38C (100.4F), Mild Pain (1 - 3)  bisacodyl 5 milliGRAM(s) Oral every 12 hours PRN Constipation  cyclobenzaprine 10 milliGRAM(s) Oral three times a day PRN Muscle Spasm  guaiFENesin    Syrup 200 milliGRAM(s) Oral every 6 hours PRN Cough  ibuprofen  Tablet. 600 milliGRAM(s) Oral every 8 hours PRN Mild Pain (1 - 3)  senna 2 Tablet(s) Oral at bedtime PRN Constipation    LABORATORY                                                  9.7    24.5  )-----------( 218      ( 27 Sep 2019 05:40 )             31.3     09-27    143  |  108  |  14  ----------------------------<  106<H>  3.6   |  26  |  0.88    Ca    8.6      27 Sep 2019 05:40      RADIOLOGY    CT Angio Chest w/ IV Cont (09.25.19 @ 19:55) >  No pulmonary embolism. Nonspecific groundglass opacities within theleft   upper lobe.    Enlarged heterogeneous prostate. No fluid collections.      < end of copied text >

## 2019-09-27 NOTE — DISCHARGE NOTE NURSING/CASE MANAGEMENT/SOCIAL WORK - PATIENT PORTAL LINK FT
You can access the FollowMyHealth Patient Portal offered by [facility] by registering at the following website: [email or web address]. By joining [employer]’s FollowMyHealth portal, you will also be able to view your health information using other applications (apps) compatible with our system.

## 2019-09-27 NOTE — PROGRESS NOTE ADULT - REASON FOR ADMISSION
fever s/p prostate biopsy

## 2019-09-27 NOTE — DISCHARGE NOTE PROVIDER - HOSPITAL COURSE
This is a 69 year old male who was admitted for fevers after a prostate biopsy a few days prior.  He was eventually recommended ertapenem per infectious disease and remained afebrile for more than 24 hours prior to discharge.  Blood cultures grew klebsiella pneumoniae initially, and repeat blood cultures were negative.  Urine culture was negative.  Patient This is a 69 year old male who was admitted for fevers after a prostate biopsy a few days prior.  He was eventually recommended ertapenem per infectious disease and remained afebrile for more than 24 hours prior to discharge.  Blood cultures grew klebsiella pneumoniae initially, and repeat blood cultures were negative.  Urine culture was negative.  Patient has been voiding well without retention.  He developed oxygen desaturation and CXR was clear and CT chest showed no pulmonary embolism and nonspecific ground glass opacities.  Pulmonology recommended outpatient follow up.  During the day his oxygen saturation is above 90% and patient is asymptomatic.  Hematology cleared the patient for his leukocytosis given patient has myeloproliferative disorder and was with an infection.  WBC trended down.  he was discharged home with appropriate prescriptions and instructions for follow up. This is a 69 year old male who was admitted for fevers after a prostate biopsy a few days prior.  He was eventually recommended ertapenem per infectious disease and remained afebrile for more than 24 hours prior to discharge.  Blood cultures grew klebsiella pneumoniae initially, and repeat blood cultures were negative.  Urine culture was negative.  Patient has been voiding well without retention.  He developed oxygen desaturation and CXR was clear and CT chest showed no pulmonary embolism and nonspecific ground glass opacities.  Pulmonology recommended outpatient follow up.  During the day of his discharge, his oxygen saturation is above 90% and patient is asymptomatic.  Hematology cleared the patient for his leukocytosis given patient has myeloproliferative disorder and was with an infection.  WBC trended down.  He was discharged home with appropriate prescriptions and instructions for follow up. This is a 69 year old male who was admitted for fevers after a prostate biopsy a few days prior.  He was eventually recommended ertapenem per infectious disease and remained afebrile for more than 24 hours prior to discharge.  Blood cultures grew klebsiella pneumoniae initially, and repeat blood cultures were negative.  Urine culture was negative.  Patient has been voiding well without retention.  He developed oxygen desaturation and CXR was clear and CT chest showed no pulmonary embolism and nonspecific ground glass opacities.  Pulmonology recommended outpatient follow up.  During the day of his discharge, his oxygen saturation is above 90% and patient is asymptomatic.  Hematology cleared the patient for his leukocytosis given patient has myeloproliferative disorder and was with an infection.  WBC trended down.  Infectious disease recommended cipro for 1 month.  He was discharged home with appropriate prescriptions and instructions for follow up.

## 2019-09-27 NOTE — PROGRESS NOTE ADULT - ASSESSMENT
68 yo man with history of myeloproliferative disorder admitted for Klebsiella bacteremia after prostate biopsy. Pulmonary service consulted for mild hypoxemia attributed to probably atelectasis. However had episodes of hypoxia during ambulation. CT chest angiogram reviewed. No pulmonary embolism. No fluid overload. There is small ground glass opacities in BEATRICE which is non specific and would not cause hypoxia. Major etiologies have been ruled out and today he was saturating 94-95% on room air and with ambulation O2 saturation is mainly 90%, so no further inpatient work up needed at this time. He also reports chronic dyspnea on exertion and would recommend ischemic evaluation.    Recommendations:  - Continue incentive spirometer  - Encourage out of bed to chair and ambulation   - Wean off oxygen as tolerated  - DVT prophylaxis  - Outpatient PFTs and ischemic evaluation     May provide outpatient pulmonary follow up upon discharge at [address] [place] [address], [address] ([phone]).  Please e-mail [email or web address] to make an appointment for the patient.  Please include the name, , and a phone number for you and the patient.    --------------------------------------------------------  Rio Dewey, PGY-4  Pulmonary/Critical Care Fellow  Pager: [phone] (JITENDRA), [phone] (NS) 68 yo man with history of myeloproliferative disorder admitted for Klebsiella bacteremia after prostate biopsy. Pulmonary service consulted for mild hypoxemia attributed to probably atelectasis. However had reported episodes of hypoxia during ambulation. CT chest angiogram reviewed. No pulmonary embolism. No fluid overload. There is small ground glass opacities in BEATRICE which is non specific and would not cause hypoxia. Major etiologies have been ruled out and today he was saturating 97% on room air and with ambulation O2 saturation is >90%, so no further inpatient work up needed at this time. He also reports chronic dyspnea on exertion and would recommend ischemic evaluation.    Recommendations:  - Continue incentive spirometer  - Encourage out of bed to chair and ambulation   - Wean off oxygen as tolerated  - DVT prophylaxis  - Outpatient PFTs and ischemic evaluation   - Repeat chest imaging in 6 weeks for follow up of ground glass opacities    May provide outpatient pulmonary follow up upon discharge at [address] [place] [address], [address] ([phone]).  Please e-mail [email or web address] to make an appointment for the patient.  Please include the name, , and a phone number for you and the patient.    --------------------------------------------------------  Rio Dewey, PGY-4  Pulmonary/Critical Care Fellow  Pager: [phone] (JITENDRA), [phone] (NS)

## 2019-09-27 NOTE — DISCHARGE NOTE PROVIDER - NSDCFUSCHEDAPPT_GEN_ALL_CORE_FT
BE [facility] ; 10/08/2019 ; [facility]  BE [facility] ; 11/12/2019 ; [facility] ; 10/08/2019 ; [facility]  BE MCKEON ; 11/12/2019 ; [facility] ; 10/08/2019 ; [facility]  BE MCKEON ; 11/12/2019 ; [facility] ; 10/08/2019 ; [facility]  BE MCKEON ; 11/12/2019 ; [facility] ; 10/08/2019 ; [facility]  BE MC[facility]ON ; 11/12/2019 ; [facility] ; 10/08/2019 ; [facility]  BE MCKEON ; 11/12/2019 ; [facility] ; 10/08/2019 ; [facility]  BE MCKEON ; 11/12/2019 ; [facility] ; 10/08/2019 ; [facility]  BE MC[facility]ON ; 11/12/2019 ; [facility] ; 10/08/2019 ; [facility]  BE [facility] ; 11/12/2019 ; [facility]

## 2019-09-27 NOTE — PROGRESS NOTE ADULT - ASSESSMENT
68 yo M PMhx of myeloproliferative disorder. came to the ED c/o fever s/p prostate bx on 9/17, post biopsy pt feel well until this afternoon when pt began to have dysuria, chills/rigors, myalgia and mild headache.  Fever, leukocytosis  Recent prostate biopsy  BCX K pne S Erta, FQ; repeat BCXs clear  Patient well but still leukocytosis, worsening  Concern for prostatitis with associated K pne bacteremia  CT reassuring, but follow up final read  WBC slightly trending down, but patient remains clinically well with no signs worsening  Overall, Gram negative bacteremia, fever, leukocytosis, sepsis, prostatitis  - Erta 1g q 24  - When DC planning, can change to Cipro 500mg q 12 for tentative 4 week course from negative BCX (duration for prostatitis uncertain, will reassess final duration in clinic)  - Have patient follow up with me in 2-3 weeks for further evaluation  - Would have WBC followed as outpatient  - F/U Heme Onc  - Discussed case with  team    Pager [phone]  After 5pm and on weekends call [phone]

## 2019-09-27 NOTE — PROGRESS NOTE ADULT - SUBJECTIVE AND OBJECTIVE BOX
The patient was seen and examined at bedside.  Denies complaints of chest pain, shortness of breath, nausea, acute pain.  He states that his urinary symptoms remain unchanged over the past day but improved overall.  Overnight, patient had an episode of hypoxia on room air to 85% saturation and is currently on supplemental oxygen.    T(C): 36.7 (09-27-19 @ 01:09), Max: 36.9 (09-26-19 @ 21:35)  HR: 78 (09-27-19 @ 01:09) (72 - 87)  BP: 128/75 (09-27-19 @ 01:09) (124/72 - 143/76)  RR: 18 (09-27-19 @ 01:09) (18 - 18)  SpO2: 85% (09-27-19 @ 01:22) (85% - 98%)  Wt(kg): --    Physical Exam:    General: NAD, A+Ox3  Abdomen: soft, non-tender, non-distended        09-26 @ 07:01  -  09-27 @ 07:00  --------------------------------------------------------  IN: 1200 mL / OUT: 1550 mL / NET: -350 mL    void - 500cc The patient was seen and examined at bedside.  Denies complaints of chest pain, shortness of breath, nausea, acute pain.  He states that his urinary symptoms remain unchanged over the past day but improved overall.  Overnight, patient had an episode of hypoxia on room air to 85% saturation and is currently on supplemental oxygen.    T(C): 36.7 (09-27-19 @ 01:09), Max: 36.9 (09-26-19 @ 21:35)  HR: 78 (09-27-19 @ 01:09) (72 - 87)  BP: 128/75 (09-27-19 @ 01:09) (124/72 - 143/76)  RR: 18 (09-27-19 @ 01:09) (18 - 18)  SpO2: 85% (09-27-19 @ 01:22) (85% - 98%)  Wt(kg): --    Physical Exam:    General: NAD, A+Ox3  Abdomen: soft, non-tender, non-distended        09-26 @ 07:01  -  09-27 @ 07:00  --------------------------------------------------------  IN: 1200 mL / OUT: 1550 mL / NET: -350 mL    void - 500cc                          9.7    24.5  )-----------( 218               31.3     143  |  108  |  14  ----------------------------<  106  3.6   |  26  |  0.88    Ca    8.6

## 2019-09-27 NOTE — DISCHARGE NOTE PROVIDER - CARE PROVIDER_API CALL
Kenji Roman)  Urology  [address] [facility], [address]  [place] [address]  Phone: [phone]  Fax: [phone]  Follow Up Time: 1 week    Noam Coker)  Hematology; Internal Medicine; Medical Oncology  [address] [place] [address]  Phone: [phone]  Fax: [phone]  Follow Up Time: Routine Kenji Roman)  Urology  [address] [facility] [address]  [place] [address]  Phone: [phone]  Fax: [phone]  Follow Up Time: 1 week    Noam Coker)  Hematology; Internal Medicine; Medical Oncology  [address] [place] [address]  Phone: [phone]  Fax: [phone]  Follow Up Time: Routine    Office, Pulmonology  [address], [address]  Phone: [phone]  Fax: (   )    -  Follow Up Time: Routine Kenji Roman)  Urology  [address] [facility], [address]  [place] [address]  Phone: [phone]  Fax: [phone]  Follow Up Time: 1 week    Noam Coker)  Hematology; Internal Medicine; Medical Oncology  [address] [place] [address]  Phone: [phone]  Fax: [phone]  Follow Up Time: Routine    Office, Pulmonology  [address] [facility], [address]  Phone: [phone]  Fax: (   )    -  Follow Up Time: Routine    Leo Colin)  Cardiovascular Disease; Internal Medicine  [address] [facility], [address]  [place] [address]  Phone: [phone]  Fax: [phone]  Follow Up Time: Routine Kenji Roman)  Urology  [address] [facility], [address]  [place] [address]  Phone: [phone]  Fax: [phone]  Follow Up Time: 1 week    Noam Coker)  Hematology; Internal Medicine; Medical Oncology  [address] [place] [address]  Phone: [phone]  Fax: [phone]  Follow Up Time: Routine    Leo Colin)  Cardiovascular Disease; Internal Medicine  [address] [facility], [address]  [place] [address]  Phone: [phone]  Fax: [phone]  Follow Up Time: Routine    Office, Pulmonology  [address] [facility] [address]  Phone: [phone]  Fax: (   )    -  Follow Up Time: Routine    Lincoln Peñaloza)  Infectious Disease; Internal Medicine  [address] [place] [address]  Phone: [phone]  Fax: [phone]  Follow Up Time: 1 month

## 2019-10-02 ENCOUNTER — APPOINTMENT (OUTPATIENT)
Dept: PULMONOLOGY | Facility: CLINIC | Age: 69
End: 2019-10-02
Payer: MEDICARE

## 2019-10-02 VITALS
HEIGHT: 68 IN | WEIGHT: 192 LBS | HEART RATE: 95 BPM | BODY MASS INDEX: 29.1 KG/M2 | TEMPERATURE: 97.7 F | RESPIRATION RATE: 18 BRPM | SYSTOLIC BLOOD PRESSURE: 126 MMHG | DIASTOLIC BLOOD PRESSURE: 84 MMHG | OXYGEN SATURATION: 94 %

## 2019-10-02 PROCEDURE — 99214 OFFICE O/P EST MOD 30 MIN: CPT

## 2019-10-02 NOTE — REVIEW OF SYSTEMS
[Negative] : Sleep Disorder [Dyspnea] : dyspnea [Hypertension] : ~T hypertension [As Noted in HPI] : as noted in HPI

## 2019-10-02 NOTE — PHYSICAL EXAM
[General Appearance - Well Developed] : well developed [Normal Appearance] : normal appearance [Well Groomed] : well groomed [General Appearance - Well Nourished] : well nourished [No Deformities] : no deformities [General Appearance - In No Acute Distress] : no acute distress [Normal Conjunctiva] : the conjunctiva exhibited no abnormalities [Eyelids - No Xanthelasma] : the eyelids demonstrated no xanthelasmas [Normal Oropharynx] : normal oropharynx [Neck Appearance] : the appearance of the neck was normal [Neck Cervical Mass (___cm)] : no neck mass was observed [Jugular Venous Distention Increased] : there was no jugular-venous distention [Thyroid Diffuse Enlargement] : the thyroid was not enlarged [Heart Rate And Rhythm] : heart rate and rhythm were normal [Thyroid Nodule] : there were no palpable thyroid nodules [Heart Sounds] : normal S1 and S2 [Murmurs] : no murmurs present [Respiration, Rhythm And Depth] : normal respiratory rhythm and effort [Exaggerated Use Of Accessory Muscles For Inspiration] : no accessory muscle use [Auscultation Breath Sounds / Voice Sounds] : lungs were clear to auscultation bilaterally [Abdomen Soft] : soft [Abdomen Tenderness] : non-tender [Abdomen Mass (___ Cm)] : no abdominal mass palpated [Abnormal Walk] : normal gait [Gait - Sufficient For Exercise Testing] : the gait was sufficient for exercise testing [Nail Clubbing] : no clubbing of the fingernails [Cyanosis, Localized] : no localized cyanosis [Petechial Hemorrhages (___cm)] : no petechial hemorrhages [Skin Color & Pigmentation] : normal skin color and pigmentation [] : no rash [No Venous Stasis] : no venous stasis [Skin Lesions] : no skin lesions [No Xanthoma] : no  xanthoma was observed [No Skin Ulcers] : no skin ulcer [Deep Tendon Reflexes (DTR)] : deep tendon reflexes were 2+ and symmetric [Sensation] : the sensory exam was normal to light touch and pinprick [No Focal Deficits] : no focal deficits [Oriented To Time, Place, And Person] : oriented to person, place, and time [Impaired Insight] : insight and judgment were intact [Affect] : the affect was normal

## 2019-10-04 NOTE — ASSESSMENT
[FreeTextEntry1] : 1. Abnormal chest CT: Likely a resolut of infectious process at the time of hospitalization. Given history of dyspnea, will f/u with CT at 6 week interval, roughly 5 weeks from now. return to office following repeat CT.\par \par 2. Oxygen desaturation: Now resolved.

## 2019-10-04 NOTE — HISTORY OF PRESENT ILLNESS
[FreeTextEntry1] : 68yo M with PMH GERD, HTN, HLD, elevated PSA s/p biopsy, secondary myelofibrosis presents for hospital f/u for abnormal CT scan and oxygen desaturation while hospitalized for sepsis following prostate biopsy.\par \par ~~~~~\par Hospital Course:\par Discharge Date 27-Sep-2019\par Admission Date 19-Sep-2019 23:07\par Reason for Admission fever s/p prostate biopsy\par This is a 69 year old male who was admitted for fevers after a prostate biopsy\par a few days prior. He was eventually recommended ertapenem per infectious\par disease and remained afebrile for more than 24 hours prior to discharge. Blood\par cultures grew klebsiella pneumoniae initially, and repeat blood cultures were\par negative. Urine culture was negative. Patient has been voiding well without\par retention. He developed oxygen desaturation and CXR was clear and CT chest\par showed no pulmonary embolism and nonspecific ground glass opacities.\par Pulmonology recommended outpatient follow up. During the day of his discharge,\par his oxygen saturation is above 90% and patient is asymptomatic. Hematology\par cleared the patient for his leukocytosis given patient has myeloproliferative\par disorder and was with an infection. WBC trended down. Infectious disease\par recommended cipro for 1 month. He was discharged home with appropriate\par prescriptions and instructions for follow up.\par ~~~~\par \par On review, CT scan demonstrates BEATRICE nonspecific groundglass opacities. Patient is doing well at this time from a pulmonary standpoint. Denies fevers, chills, chest pain/tightness, cough, wheeze. His SpO2 today is 94% on room air. He notes a history of always feeling a little short of breath. When he was younger, he usually opted for less strenuous activities such as baseball instead of basketball as he would get short of breath with exertion. Denies history of pulmonary disease. Lifelong nonsmoker. Now retired.\par \par During his hospitalization, he noted concurrent hypotension and tachycardia during times when he desaturated.

## 2019-10-04 NOTE — END OF VISIT
[FreeTextEntry3] : I directly supervised nurse practitioner Abdelrahman Sullivan and was present during key points of his history and physical. I agree with his history, physical and assessment.\par

## 2019-10-04 NOTE — REASON FOR VISIT
[Follow-Up - From Hospitalization] : a hospitalization follow-up [FreeTextEntry2] : oxygen desaturation during hospitalization

## 2019-10-08 ENCOUNTER — LABORATORY RESULT (OUTPATIENT)
Age: 69
End: 2019-10-08

## 2019-10-08 ENCOUNTER — APPOINTMENT (OUTPATIENT)
Dept: CARDIOLOGY | Facility: CLINIC | Age: 69
End: 2019-10-08
Payer: MEDICARE

## 2019-10-08 ENCOUNTER — NON-APPOINTMENT (OUTPATIENT)
Age: 69
End: 2019-10-08

## 2019-10-08 VITALS
DIASTOLIC BLOOD PRESSURE: 70 MMHG | WEIGHT: 192 LBS | SYSTOLIC BLOOD PRESSURE: 120 MMHG | BODY MASS INDEX: 29.19 KG/M2 | HEART RATE: 70 BPM | OXYGEN SATURATION: 99 %

## 2019-10-08 VITALS — SYSTOLIC BLOOD PRESSURE: 114 MMHG | DIASTOLIC BLOOD PRESSURE: 72 MMHG

## 2019-10-08 DIAGNOSIS — I51.7 CARDIOMEGALY: ICD-10-CM

## 2019-10-08 PROCEDURE — 93000 ELECTROCARDIOGRAM COMPLETE: CPT

## 2019-10-08 PROCEDURE — 36415 COLL VENOUS BLD VENIPUNCTURE: CPT

## 2019-10-08 PROCEDURE — 90662 IIV NO PRSV INCREASED AG IM: CPT

## 2019-10-08 PROCEDURE — 99214 OFFICE O/P EST MOD 30 MIN: CPT

## 2019-10-08 PROCEDURE — G0008: CPT

## 2019-10-08 NOTE — HISTORY OF PRESENT ILLNESS
[FreeTextEntry1] : On Cipro 500 twice daily and Flomax 0.4 daily.  \par Cardiac enlargement and calcifications coronary arteries noted on CT scan. \par He feels well.  He denies chest pain, shortness of breath, and palpitations.

## 2019-10-13 LAB
ALBUMIN SERPL ELPH-MCNC: 4.9 G/DL
ALP BLD-CCNC: 164 U/L
ALT SERPL-CCNC: 53 U/L
ANION GAP SERPL CALC-SCNC: 11 MMOL/L
AST SERPL-CCNC: 35 U/L
BASOPHILS # BLD AUTO: 0.21 K/UL
BASOPHILS NFR BLD AUTO: 1.7 %
BILIRUB SERPL-MCNC: 1.1 MG/DL
BUN SERPL-MCNC: 20 MG/DL
CALCIUM SERPL-MCNC: 9.7 MG/DL
CHLORIDE SERPL-SCNC: 103 MMOL/L
CHOLEST SERPL-MCNC: 95 MG/DL
CHOLEST/HDLC SERPL: 2.8 RATIO
CO2 SERPL-SCNC: 26 MMOL/L
CREAT SERPL-MCNC: 0.9 MG/DL
EOSINOPHIL # BLD AUTO: 0 K/UL
EOSINOPHIL NFR BLD AUTO: 0 %
ESTIMATED AVERAGE GLUCOSE: 117 MG/DL
GLUCOSE SERPL-MCNC: 104 MG/DL
HBA1C MFR BLD HPLC: 5.7 %
HCT VFR BLD CALC: 42.3 %
HDLC SERPL-MCNC: 34 MG/DL
HGB BLD-MCNC: 12.1 G/DL
LDLC SERPL CALC-MCNC: 42 MG/DL
LDLC SERPL DIRECT ASSAY-MCNC: 51 MG/DL
LYMPHOCYTES # BLD AUTO: 1.1 K/UL
LYMPHOCYTES NFR BLD AUTO: 8.7 %
MAN DIFF?: NORMAL
MCHC RBC-ENTMCNC: 27.9 PG
MCHC RBC-ENTMCNC: 28.6 GM/DL
MCV RBC AUTO: 97.5 FL
MONOCYTES # BLD AUTO: 3.51 K/UL
MONOCYTES NFR BLD AUTO: 27.8 %
NEUTROPHILS # BLD AUTO: 7.14 K/UL
NEUTROPHILS NFR BLD AUTO: 53 %
PLATELET # BLD AUTO: 501 K/UL
POTASSIUM SERPL-SCNC: 5.2 MMOL/L
PROT SERPL-MCNC: 7 G/DL
RBC # BLD: 4.34 M/UL
RBC # FLD: 17.5 %
SODIUM SERPL-SCNC: 140 MMOL/L
T3 SERPL-MCNC: 115 NG/DL
T3RU NFR SERPL: 1 TBI
T4 FREE SERPL-MCNC: 1.5 NG/DL
T4 SERPL-MCNC: 7.7 UG/DL
TRIGL SERPL-MCNC: 96 MG/DL
TSH SERPL-ACNC: 3.36 UIU/ML
WBC # FLD AUTO: 12.63 K/UL

## 2019-10-21 ENCOUNTER — APPOINTMENT (OUTPATIENT)
Dept: CARDIOLOGY | Facility: CLINIC | Age: 69
End: 2019-10-21
Payer: MEDICARE

## 2019-10-21 PROCEDURE — 93306 TTE W/DOPPLER COMPLETE: CPT

## 2019-10-24 ENCOUNTER — LABORATORY RESULT (OUTPATIENT)
Age: 69
End: 2019-10-24

## 2019-10-24 ENCOUNTER — APPOINTMENT (OUTPATIENT)
Dept: INFECTIOUS DISEASE | Facility: CLINIC | Age: 69
End: 2019-10-24
Payer: MEDICARE

## 2019-10-24 VITALS
BODY MASS INDEX: 28.14 KG/M2 | HEIGHT: 69 IN | HEART RATE: 85 BPM | TEMPERATURE: 97.1 F | SYSTOLIC BLOOD PRESSURE: 126 MMHG | WEIGHT: 190 LBS | DIASTOLIC BLOOD PRESSURE: 79 MMHG | OXYGEN SATURATION: 96 %

## 2019-10-24 DIAGNOSIS — R78.81 BACTEREMIA: ICD-10-CM

## 2019-10-24 DIAGNOSIS — N41.9 INFLAMMATORY DISEASE OF PROSTATE, UNSPECIFIED: ICD-10-CM

## 2019-10-24 PROCEDURE — 99214 OFFICE O/P EST MOD 30 MIN: CPT

## 2019-10-25 PROBLEM — R78.81 BACTEREMIA DUE TO GRAM-NEGATIVE BACTERIA: Status: ACTIVE | Noted: 2019-10-25

## 2019-10-25 LAB
BASOPHILS # BLD AUTO: 0 K/UL
BASOPHILS NFR BLD AUTO: 0 %
EOSINOPHIL # BLD AUTO: 0 K/UL
EOSINOPHIL NFR BLD AUTO: 0 %
HCT VFR BLD CALC: 38.4 %
HGB BLD-MCNC: 11.5 G/DL
LYMPHOCYTES # BLD AUTO: 1.03 K/UL
LYMPHOCYTES NFR BLD AUTO: 10.2 %
MAN DIFF?: NORMAL
MCHC RBC-ENTMCNC: 28.4 PG
MCHC RBC-ENTMCNC: 29.9 GM/DL
MCV RBC AUTO: 94.8 FL
MONOCYTES # BLD AUTO: 1.36 K/UL
MONOCYTES NFR BLD AUTO: 13.5 %
NEUTROPHILS # BLD AUTO: 6.85 K/UL
NEUTROPHILS NFR BLD AUTO: 67.8 %
PLATELET # BLD AUTO: 381 K/UL
RBC # BLD: 4.05 M/UL
RBC # FLD: 17.1 %
WBC # FLD AUTO: 10.1 K/UL

## 2019-10-25 NOTE — DATA REVIEWED
[FreeTextEntry1] : 9/20 BCX K pne S Cipro\par \par 9/25 CT:\par \par IMPRESSION: \par \par No pulmonary embolism. Nonspecific groundglass opacities within the left \par upper lobe.\par \par Enlarged heterogeneous prostate. No fluid collections.

## 2019-10-25 NOTE — ASSESSMENT
[FreeTextEntry1] : 70 yo M PMhx of myeloproliferative disorder. came to the ED c/o fever s/p prostate bx on 9/17, post biopsy had episode sepsis, hospitalized at [facility].\par Now completing treatment for prostatitis and K pne bacteremia (S Cipro)\par No further fevers, no new  symptoms, doing well\par Completing Cipro tomorrow\par Has history of unspecified myeloproliferative disorder, with WBC being elevated without any further bacterial source found--following with Heme/Onc\par Overall, K pne bacteremia, prostatitis, leukocytosis\par - Complete course Cipro through tomorrow\par - Check CBC, BCXs\par - Follow up with  and Heme Onc\par - Call or RTC PRN new concerns or questions

## 2019-10-25 NOTE — HISTORY OF PRESENT ILLNESS
[FreeTextEntry1] : 68 yo M with recent prostate biopsy, with episode of suspected prostatitis, and K pne bacteremia recently seen at [facility], now presenting to ID clinic for follow up. Patient had prostate biopsy then shortly following had episode of sepsis at [facility]. Was found to have K pne bacteremia, S to Cipro. Now has been treated for ~ 4 week course of Cipro for bacteremia with ? of prostatitis associated with biopsy. Since discharge, patient has been well with no new complaints. NO fevers, no chills. Has followed up with urology as well as other physicians, otherwise doing well.

## 2019-10-25 NOTE — PHYSICAL EXAM
[General Appearance - Alert] : alert [General Appearance - In No Acute Distress] : in no acute distress [General Appearance - Well Nourished] : well nourished [General Appearance - Well-Appearing] : healthy appearing [] : no respiratory distress [Respiration, Rhythm And Depth] : normal respiratory rhythm and effort [Exaggerated Use Of Accessory Muscles For Inspiration] : no accessory muscle use [Auscultation Breath Sounds / Voice Sounds] : lungs were clear to auscultation bilaterally [Heart Rate And Rhythm] : heart rate was normal and rhythm regular [Heart Sounds] : normal S1 and S2 [Murmurs] : no murmurs [Bowel Sounds] : normal bowel sounds [Abdomen Soft] : soft [Abdomen Tenderness] : non-tender [Abdomen Mass (___ Cm)] : no abdominal mass palpated [No Palpable Adenopathy] : no palpable adenopathy [Oriented To Time, Place, And Person] : oriented to person, place, and time [Affect] : the affect was normal

## 2019-10-29 ENCOUNTER — OUTPATIENT (OUTPATIENT)
Dept: OUTPATIENT SERVICES | Facility: HOSPITAL | Age: 69
LOS: 1 days | Discharge: HOME | End: 2019-10-29

## 2019-10-29 DIAGNOSIS — D47.3 ESSENTIAL (HEMORRHAGIC) THROMBOCYTHEMIA: ICD-10-CM

## 2019-11-04 LAB
BACTERIA BLD CULT: NORMAL
BACTERIA BLD CULT: NORMAL

## 2019-11-05 ENCOUNTER — APPOINTMENT (OUTPATIENT)
Dept: CT IMAGING | Facility: CLINIC | Age: 69
End: 2019-11-05
Payer: MEDICARE

## 2019-11-05 ENCOUNTER — OUTPATIENT (OUTPATIENT)
Dept: OUTPATIENT SERVICES | Facility: HOSPITAL | Age: 69
LOS: 1 days | End: 2019-11-05
Payer: MEDICARE

## 2019-11-05 DIAGNOSIS — Z00.8 ENCOUNTER FOR OTHER GENERAL EXAMINATION: ICD-10-CM

## 2019-11-05 PROCEDURE — 71250 CT THORAX DX C-: CPT

## 2019-11-05 PROCEDURE — 71250 CT THORAX DX C-: CPT | Mod: 26

## 2019-11-12 ENCOUNTER — APPOINTMENT (OUTPATIENT)
Dept: PULMONOLOGY | Facility: CLINIC | Age: 69
End: 2019-11-12
Payer: MEDICARE

## 2019-11-12 ENCOUNTER — RESULT REVIEW (OUTPATIENT)
Age: 69
End: 2019-11-12

## 2019-11-12 ENCOUNTER — APPOINTMENT (OUTPATIENT)
Dept: HEMATOLOGY ONCOLOGY | Facility: CLINIC | Age: 69
End: 2019-11-12
Payer: MEDICARE

## 2019-11-12 VITALS
WEIGHT: 196 LBS | DIASTOLIC BLOOD PRESSURE: 82 MMHG | HEART RATE: 77 BPM | RESPIRATION RATE: 16 BRPM | TEMPERATURE: 98 F | BODY MASS INDEX: 29.03 KG/M2 | HEIGHT: 69 IN | SYSTOLIC BLOOD PRESSURE: 135 MMHG | OXYGEN SATURATION: 98 %

## 2019-11-12 VITALS
DIASTOLIC BLOOD PRESSURE: 81 MMHG | WEIGHT: 196.65 LBS | RESPIRATION RATE: 17 BRPM | SYSTOLIC BLOOD PRESSURE: 126 MMHG | OXYGEN SATURATION: 99 % | BODY MASS INDEX: 29.04 KG/M2 | HEART RATE: 73 BPM | TEMPERATURE: 97.5 F

## 2019-11-12 DIAGNOSIS — R93.89 ABNORMAL FINDINGS ON DIAGNOSTIC IMAGING OF OTHER SPECIFIED BODY STRUCTURES: ICD-10-CM

## 2019-11-12 LAB
EOSINOPHIL # BLD AUTO: 0 K/UL — SIGNIFICANT CHANGE UP (ref 0–0.5)
EOSINOPHIL NFR BLD AUTO: 2 % — SIGNIFICANT CHANGE UP (ref 0–6)
HCT VFR BLD CALC: 38.1 % — LOW (ref 39–50)
HGB BLD-MCNC: 13 G/DL — SIGNIFICANT CHANGE UP (ref 13–17)
LYMPHOCYTES # BLD AUTO: 1.4 K/UL — SIGNIFICANT CHANGE UP (ref 1–3.3)
LYMPHOCYTES # BLD AUTO: 16 % — SIGNIFICANT CHANGE UP (ref 13–44)
MCHC RBC-ENTMCNC: 30.6 PG — SIGNIFICANT CHANGE UP (ref 27–34)
MCHC RBC-ENTMCNC: 34.2 G/DL — SIGNIFICANT CHANGE UP (ref 32–36)
MCV RBC AUTO: 89.3 FL — SIGNIFICANT CHANGE UP (ref 80–100)
MONOCYTES # BLD AUTO: 2.3 K/UL — HIGH (ref 0–0.9)
MONOCYTES NFR BLD AUTO: 26 % — HIGH (ref 2–14)
NEUTROPHILS # BLD AUTO: 5.1 K/UL — SIGNIFICANT CHANGE UP (ref 1.8–7.4)
NEUTROPHILS NFR BLD AUTO: 55 % — SIGNIFICANT CHANGE UP (ref 43–77)
NEUTS BAND # BLD: 1 % — SIGNIFICANT CHANGE UP (ref 0–8)
PLAT MORPH BLD: NORMAL — SIGNIFICANT CHANGE UP
PLATELET # BLD AUTO: 407 K/UL — HIGH (ref 150–400)
RBC # BLD: 4.26 M/UL — SIGNIFICANT CHANGE UP (ref 4.2–5.8)
RBC # FLD: 15.2 % — HIGH (ref 10.3–14.5)
RBC BLD AUTO: SIGNIFICANT CHANGE UP
WBC # BLD: 9 K/UL — SIGNIFICANT CHANGE UP (ref 3.8–10.5)
WBC # FLD AUTO: 9 K/UL — SIGNIFICANT CHANGE UP (ref 3.8–10.5)

## 2019-11-12 PROCEDURE — 94010 BREATHING CAPACITY TEST: CPT

## 2019-11-12 PROCEDURE — 94729 DIFFUSING CAPACITY: CPT

## 2019-11-12 PROCEDURE — 99214 OFFICE O/P EST MOD 30 MIN: CPT | Mod: 25

## 2019-11-12 PROCEDURE — 94726 PLETHYSMOGRAPHY LUNG VOLUMES: CPT

## 2019-11-12 PROCEDURE — 99214 OFFICE O/P EST MOD 30 MIN: CPT

## 2019-11-12 PROCEDURE — ZZZZZ: CPT

## 2019-11-12 NOTE — ASSESSMENT
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 He now has mild splenomegaly. He is otherwise clinically stable and no intervention is required. His myelofibrosis is too advanced to treat with alpha interferon. He was also found to have a small ulceration in his terminal ileum that may be related to aspirin. I believe the benefit of aspirin outweighs the risk and favor continuing it.\par \par Plan:\par Observe\par ASA 81 mg daily - to use enteric coated\par RTC 3 months\par \par

## 2019-11-12 NOTE — PHYSICAL EXAM
[General Appearance - Well Developed] : well developed [Normal Appearance] : normal appearance [Well Groomed] : well groomed [General Appearance - Well Nourished] : well nourished [No Deformities] : no deformities [General Appearance - In No Acute Distress] : no acute distress [Normal Conjunctiva] : the conjunctiva exhibited no abnormalities [Eyelids - No Xanthelasma] : the eyelids demonstrated no xanthelasmas [Normal Oropharynx] : normal oropharynx [Neck Cervical Mass (___cm)] : no neck mass was observed [Neck Appearance] : the appearance of the neck was normal [Jugular Venous Distention Increased] : there was no jugular-venous distention [Thyroid Diffuse Enlargement] : the thyroid was not enlarged [Thyroid Nodule] : there were no palpable thyroid nodules [Heart Rate And Rhythm] : heart rate and rhythm were normal [Heart Sounds] : normal S1 and S2 [Murmurs] : no murmurs present [Respiration, Rhythm And Depth] : normal respiratory rhythm and effort [Exaggerated Use Of Accessory Muscles For Inspiration] : no accessory muscle use [Auscultation Breath Sounds / Voice Sounds] : lungs were clear to auscultation bilaterally [Abdomen Tenderness] : non-tender [Abdomen Soft] : soft [Abdomen Mass (___ Cm)] : no abdominal mass palpated [Abnormal Walk] : normal gait [Gait - Sufficient For Exercise Testing] : the gait was sufficient for exercise testing [Nail Clubbing] : no clubbing of the fingernails [Cyanosis, Localized] : no localized cyanosis [Petechial Hemorrhages (___cm)] : no petechial hemorrhages [Skin Color & Pigmentation] : normal skin color and pigmentation [No Venous Stasis] : no venous stasis [] : no rash [Skin Lesions] : no skin lesions [No Xanthoma] : no  xanthoma was observed [No Skin Ulcers] : no skin ulcer [Sensation] : the sensory exam was normal to light touch and pinprick [Deep Tendon Reflexes (DTR)] : deep tendon reflexes were 2+ and symmetric [No Focal Deficits] : no focal deficits [Oriented To Time, Place, And Person] : oriented to person, place, and time [Affect] : the affect was normal [Impaired Insight] : insight and judgment were intact

## 2019-11-12 NOTE — ADDENDUM
[FreeTextEntry1] : Documented by Shanita Mejía acting as a scribe for Dr. Noam Coker on 11/12/2019 \par \par All medical record entries made by the Scribe were at my, Dr. Noam Coker's, direction and personally dictated by me on 11/12/2019. I have reviewed the chart and agree that the record accurately reflects my personal performance of the history, physical exam, results, assessment and plan. I have also personally directed, reviewed, and agree with the discharge instructions.

## 2019-11-12 NOTE — HISTORY OF PRESENT ILLNESS
[Disease:__________________________] : Disease: [unfilled] [de-identified] : 4/2019 Secondary myelofibrosis with monocytosis , fibrosis grade 2-3/3; 46,XY, del(13)(q12q14); NGS + MPL, TET2 [de-identified] : JAK2, bcr abl, calreticulin negative\par mpl exon 10 + [de-identified] : He feels well. Had prostate biopsy September 13. Afterwards was admitted with Klebsiella sepsis and was treated with Cipro. He reports prostate biopsy was benign. He notes some vague fleeting sticking pain on his LUQ. His pruritus has been tolerable and stable. He notes no SOB, chest pain, headaches, visual problems, abdominal pain, bleeding, bruising, leg pain/swelling, foot pain, BRBPR, melena. He received flu vaccine 2019. He has had second opinion consultations at [facility] and [facility]. He reports observation was recommended.

## 2019-11-12 NOTE — HISTORY OF PRESENT ILLNESS
[FreeTextEntry1] : 68yo M with PMH GERD, HTN, HLD, elevated PSA s/p biopsy, secondary myelofibrosis presents for hospital f/u for abnormal CT scan and oxygen desaturation while hospitalized for sepsis following prostate biopsy.\par \par CT Chest on 11/5 demonstrates resolution of BEATRICE opacities. RLL 4mm nodule unchanged from prior CT.\par \par Patient feels well today, asymptomatic. SpO2 98% on RA after walk from waiting room to office.\par \par PFT today normal.

## 2019-11-12 NOTE — RESULTS/DATA
[FreeTextEntry1] : WBC 9,000, Hgb 13, Hct 38.1, Plts 407,000, Diff 1B, 55 P, 16 L, 26 M, 2 Eos, ANC 5,100

## 2019-11-12 NOTE — PHYSICAL EXAM
[Fully active, able to carry on all pre-disease performance without restriction] : Status 0 - Fully active, able to carry on all pre-disease performance without restriction [Normal] : affect appropriate [de-identified] : BS normal. soft, nontender. Spleen palpable 4 cm below LCM-AAL. No hepatomegaly, masses. [de-identified] : 3 x 3 cm lipoma on posterior left upper extremity

## 2019-11-12 NOTE — REVIEW OF SYSTEMS
[Dyspnea] : dyspnea [Hypertension] : ~T hypertension [As Noted in HPI] : as noted in HPI [Negative] : Sleep Disorder

## 2019-12-03 ENCOUNTER — APPOINTMENT (OUTPATIENT)
Dept: CARDIOLOGY | Facility: CLINIC | Age: 69
End: 2019-12-03
Payer: MEDICARE

## 2019-12-03 ENCOUNTER — NON-APPOINTMENT (OUTPATIENT)
Age: 69
End: 2019-12-03

## 2019-12-03 VITALS
OXYGEN SATURATION: 97 % | DIASTOLIC BLOOD PRESSURE: 80 MMHG | WEIGHT: 16 LBS | BODY MASS INDEX: 2.36 KG/M2 | HEART RATE: 78 BPM | SYSTOLIC BLOOD PRESSURE: 120 MMHG

## 2019-12-03 VITALS — DIASTOLIC BLOOD PRESSURE: 66 MMHG | SYSTOLIC BLOOD PRESSURE: 112 MMHG

## 2019-12-03 PROCEDURE — 99214 OFFICE O/P EST MOD 30 MIN: CPT

## 2019-12-03 PROCEDURE — 93000 ELECTROCARDIOGRAM COMPLETE: CPT

## 2019-12-03 PROCEDURE — 36415 COLL VENOUS BLD VENIPUNCTURE: CPT

## 2019-12-03 NOTE — HISTORY OF PRESENT ILLNESS
[FreeTextEntry1] : He denies chest pain, shortness of breath, and palpitations.\par He will be seeing Dr. Kenji Roman in 6 months.

## 2019-12-07 LAB
ALBUMIN SERPL ELPH-MCNC: 5 G/DL
ALP BLD-CCNC: 110 U/L
ALT SERPL-CCNC: 25 U/L
ANION GAP SERPL CALC-SCNC: 16 MMOL/L
AST SERPL-CCNC: 28 U/L
BILIRUB SERPL-MCNC: 0.9 MG/DL
BUN SERPL-MCNC: 17 MG/DL
CALCIUM SERPL-MCNC: 9.7 MG/DL
CHLORIDE SERPL-SCNC: 99 MMOL/L
CO2 SERPL-SCNC: 26 MMOL/L
CREAT SERPL-MCNC: 0.83 MG/DL
POTASSIUM SERPL-SCNC: 4.8 MMOL/L
PROT SERPL-MCNC: 7.2 G/DL
SODIUM SERPL-SCNC: 141 MMOL/L

## 2020-02-01 ENCOUNTER — TRANSCRIPTION ENCOUNTER (OUTPATIENT)
Age: 70
End: 2020-02-01

## 2020-02-10 ENCOUNTER — OUTPATIENT (OUTPATIENT)
Dept: OUTPATIENT SERVICES | Facility: HOSPITAL | Age: 70
LOS: 1 days | Discharge: HOME | End: 2020-02-10

## 2020-02-10 DIAGNOSIS — D47.3 ESSENTIAL (HEMORRHAGIC) THROMBOCYTHEMIA: ICD-10-CM

## 2020-02-12 ENCOUNTER — RESULT REVIEW (OUTPATIENT)
Age: 70
End: 2020-02-12

## 2020-02-12 ENCOUNTER — APPOINTMENT (OUTPATIENT)
Dept: HEMATOLOGY ONCOLOGY | Facility: CLINIC | Age: 70
End: 2020-02-12
Payer: MEDICARE

## 2020-02-12 VITALS
SYSTOLIC BLOOD PRESSURE: 116 MMHG | HEART RATE: 63 BPM | RESPIRATION RATE: 15 BRPM | OXYGEN SATURATION: 100 % | WEIGHT: 192.9 LBS | TEMPERATURE: 97.7 F | DIASTOLIC BLOOD PRESSURE: 76 MMHG | BODY MASS INDEX: 28.49 KG/M2

## 2020-02-12 LAB
BASOPHILS # BLD AUTO: 0.1 K/UL — SIGNIFICANT CHANGE UP (ref 0–0.2)
BASOPHILS NFR BLD AUTO: 1 % — SIGNIFICANT CHANGE UP (ref 0–2)
EOSINOPHIL # BLD AUTO: 0.1 K/UL — SIGNIFICANT CHANGE UP (ref 0–0.5)
HCT VFR BLD CALC: 40.3 % — SIGNIFICANT CHANGE UP (ref 39–50)
HGB BLD-MCNC: 13.1 G/DL — SIGNIFICANT CHANGE UP (ref 13–17)
LYMPHOCYTES # BLD AUTO: 1.9 K/UL — SIGNIFICANT CHANGE UP (ref 1–3.3)
LYMPHOCYTES # BLD AUTO: 17 % — SIGNIFICANT CHANGE UP (ref 13–44)
MCHC RBC-ENTMCNC: 28.6 PG — SIGNIFICANT CHANGE UP (ref 27–34)
MCHC RBC-ENTMCNC: 32.5 G/DL — SIGNIFICANT CHANGE UP (ref 32–36)
MCV RBC AUTO: 88 FL — SIGNIFICANT CHANGE UP (ref 80–100)
MONOCYTES # BLD AUTO: 2.8 K/UL — HIGH (ref 0–0.9)
MONOCYTES NFR BLD AUTO: 24 % — HIGH (ref 2–14)
NEUTROPHILS # BLD AUTO: 7.6 K/UL — HIGH (ref 1.8–7.4)
NEUTROPHILS NFR BLD AUTO: 57 % — SIGNIFICANT CHANGE UP (ref 43–77)
NEUTS BAND # BLD: 1 % — SIGNIFICANT CHANGE UP (ref 0–8)
PLAT MORPH BLD: NORMAL — SIGNIFICANT CHANGE UP
PLATELET # BLD AUTO: 253 K/UL — SIGNIFICANT CHANGE UP (ref 150–400)
RBC # BLD: 4.58 M/UL — SIGNIFICANT CHANGE UP (ref 4.2–5.8)
RBC # FLD: 14.7 % — HIGH (ref 10.3–14.5)
RBC BLD AUTO: SIGNIFICANT CHANGE UP
WBC # BLD: 12.5 K/UL — HIGH (ref 3.8–10.5)
WBC # FLD AUTO: 12.5 K/UL — HIGH (ref 3.8–10.5)

## 2020-02-12 PROCEDURE — 99214 OFFICE O/P EST MOD 30 MIN: CPT

## 2020-02-12 NOTE — HISTORY OF PRESENT ILLNESS
[Disease:__________________________] : Disease: [unfilled] [de-identified] : 4/2019 Secondary myelofibrosis with monocytosis , fibrosis grade 2-3/3; 46,XY, del(13)(q12q14); NGS + MPL, TET2 [de-identified] : JAK2, bcr abl, calreticulin negative\par mpl exon 10 + [de-identified] : He feels well. Recently treated with antibiotics for sinus infection. Now resolving. His pruritus has been tolerable, limited and stable. Vague mild left abdominal pain occurring after ingestion of food.No early satiety. No other complaints. He notes no shortness of breath, chest pain, headaches, visual problems, abdominal pain, bleeding, bruising, leg pain/swelling, foot pain, BRBPR, melena. Reports prostate biopsy was normal. Received Flu vaccine 2019.

## 2020-02-12 NOTE — PHYSICAL EXAM
[Fully active, able to carry on all pre-disease performance without restriction] : Status 0 - Fully active, able to carry on all pre-disease performance without restriction [Normal] : affect appropriate [de-identified] : BS normal. Soft, nontender. Spleen palpable at [facility]. No hepatomegaly, masses. [de-identified] : 3 x 3 cm lipoma on posterior left upper extremity

## 2020-02-12 NOTE — RESULTS/DATA
[FreeTextEntry1] : WBC 12,500, Hgb 13.1, Hct 40.3, MCV 88.0, Plts 253,000, Diff  57 P, 17 L, 24 M, 1 Ba, 1 Band, ANC 7,600\par \par 12/07/19\par CMP: BUN 17, Creatinine 0.83, eGFR 90\par

## 2020-02-12 NOTE — ADDENDUM
[FreeTextEntry1] : Documented by Rigo Cerna acting as a scribe for Dr. Noam Coker on 02/12/2020 \par \par All medical record entries made by the Scribe were at my, Dr. Noam Coker's, direction and personally dictated by me on 02/12/2020. I have reviewed the chart and agree that the record accurately reflects my personal performance of the history, physical exam, results, assessment and plan. I have also personally directed, reviewed, and agree with the discharge instructions.

## 2020-02-12 NOTE — REASON FOR VISIT
[Myeloproliferative Disorder] : myeloproliferative disorder [Thrombocytosis] : thrombocytosis [Follow-Up Visit] : a follow-up visit for

## 2020-02-12 NOTE — ASSESSMENT
[Palliative Care Plan] : not applicable at this time [FreeTextEntry1] : 70 year old male with mpl+ ET who has now evolved secondary myelofibrosis with monocytosis. 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 He now has mild splenomegaly. He is otherwise clinically stable and no intervention is required. His myelofibrosis is too advanced to treat with alpha interferon. He was also found to have a small ulceration in his terminal ileum that may be related to aspirin. I believe the benefit of aspirin outweighs the risk and favor continuing it.\par \par Plan:\par Observe\par ASA 81 mg daily - to use enteric coated\par RTC 3 months\par \par

## 2020-02-26 ENCOUNTER — RX RENEWAL (OUTPATIENT)
Age: 70
End: 2020-02-26

## 2020-03-03 ENCOUNTER — APPOINTMENT (OUTPATIENT)
Dept: CARDIOLOGY | Facility: CLINIC | Age: 70
End: 2020-03-03
Payer: MEDICARE

## 2020-03-03 DIAGNOSIS — J32.9 CHRONIC SINUSITIS, UNSPECIFIED: ICD-10-CM

## 2020-03-03 PROCEDURE — 93351 STRESS TTE COMPLETE: CPT

## 2020-03-03 PROCEDURE — 99214 OFFICE O/P EST MOD 30 MIN: CPT

## 2020-03-30 ENCOUNTER — TRANSCRIPTION ENCOUNTER (OUTPATIENT)
Age: 70
End: 2020-03-30

## 2020-04-29 ENCOUNTER — OUTPATIENT (OUTPATIENT)
Dept: OUTPATIENT SERVICES | Facility: HOSPITAL | Age: 70
LOS: 1 days | Discharge: HOME | End: 2020-04-29

## 2020-04-29 DIAGNOSIS — D47.3 ESSENTIAL (HEMORRHAGIC) THROMBOCYTHEMIA: ICD-10-CM

## 2020-05-02 ENCOUNTER — LABORATORY RESULT (OUTPATIENT)
Age: 70
End: 2020-05-02

## 2020-05-05 ENCOUNTER — APPOINTMENT (OUTPATIENT)
Dept: HEMATOLOGY ONCOLOGY | Facility: CLINIC | Age: 70
End: 2020-05-05
Payer: MEDICARE

## 2020-05-05 ENCOUNTER — APPOINTMENT (OUTPATIENT)
Dept: HEMATOLOGY ONCOLOGY | Facility: CLINIC | Age: 70
End: 2020-05-05

## 2020-05-05 PROCEDURE — 99214 OFFICE O/P EST MOD 30 MIN: CPT | Mod: 95

## 2020-05-05 RX ORDER — POLYETHYLENE GLYCOL-3350, SODIUM CHLORIDE, POTASSIUM CHLORIDE AND SODIUM BICARBONATE 420; 11.2; 5.72; 1.48 G/438.4G; G/438.4G; G/438.4G; G/438.4G
420 POWDER, FOR SOLUTION ORAL
Qty: 1 | Refills: 0 | Status: DISCONTINUED | COMMUNITY
Start: 2019-06-06 | End: 2020-05-05

## 2020-05-05 NOTE — REVIEW OF SYSTEMS
[Recent Change In Weight] : ~T recent weight change [Negative] : Allergic/Immunologic [de-identified] : Pruritus

## 2020-05-05 NOTE — RESULTS/DATA
[FreeTextEntry1] : 5/2/20:\par WBC 18,800 Hgb 11.3 Hct 37.6 Platelets 371.000   7 blasts, 2 promyelo, 7 myelo, 10 meta, 8B, 36P, 17L, 10M, 1 Eo, 2 Ba, 4 NRBC/100 WBC

## 2020-05-05 NOTE — HISTORY OF PRESENT ILLNESS
[Home] : at home, [unfilled] , at the time of the visit. [Patient] : the patient [Medical Office: ([facility])___] : at the medical office located in  [Self] : self [Disease:__________________________] : Disease: [unfilled] [de-identified] : 4/2019 Secondary myelofibrosis with monocytosis , fibrosis grade 2-3/3; 46,XY, del(13)(q12q14); NGS + MPL, TET2 [de-identified] : JAK2, bcr abl, calreticulin negative\par mpl exon 10 + [de-identified] : He feels well now. Recently had a couple of months of cough, sneezing, achiness, constipation and maybe low grade fevers. Now resolved. Declines COVID testing. His spleen varies in size. Lost 4 lbs on a diet. No abdominal pain, early satiety. His pruritus has been tolerable, limited and stable.. No other complaints. He notes no night sweats, shortness of breath, chest pain, headaches, visual problems, abdominal pain, bleeding, bruising, leg pain/swelling, foot pain, BRBPR, melena.

## 2020-05-05 NOTE — ASSESSMENT
[FreeTextEntry1] : 70 year old male with mpl+ ET who has now evolved secondary myelofibrosis with monocytosis. 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 His WBC is rising with a drop in his hgb and rising blast count. Monocytosis has resolved. I reviewed with him that these may reflect worsening myelofibrosis or transformation to AML and the implications of either. He understands. I suggested repeat marrow exam. He is reluctant to come in due to COVID. We agreed to repeat a CBC in 1 month and then talk. \par \par Plan:\par Observe\par ASA 81 mg daily - to use enteric coated\par CBC in 1 month\par Call prn any problems\par \par  [Palliative Care Plan] : not applicable at this time

## 2020-05-06 ENCOUNTER — TRANSCRIPTION ENCOUNTER (OUTPATIENT)
Age: 70
End: 2020-05-06

## 2020-05-11 ENCOUNTER — TRANSCRIPTION ENCOUNTER (OUTPATIENT)
Age: 70
End: 2020-05-11

## 2020-05-15 ENCOUNTER — OUTPATIENT (OUTPATIENT)
Dept: OUTPATIENT SERVICES | Facility: HOSPITAL | Age: 70
LOS: 1 days | End: 2020-05-15
Payer: MEDICARE

## 2020-05-15 DIAGNOSIS — D47.3 ESSENTIAL (HEMORRHAGIC) THROMBOCYTHEMIA: ICD-10-CM

## 2020-05-19 ENCOUNTER — RESULT REVIEW (OUTPATIENT)
Age: 70
End: 2020-05-19

## 2020-05-19 ENCOUNTER — APPOINTMENT (OUTPATIENT)
Dept: HEMATOLOGY ONCOLOGY | Facility: CLINIC | Age: 70
End: 2020-05-19
Payer: MEDICARE

## 2020-05-19 VITALS
BODY MASS INDEX: 27.67 KG/M2 | WEIGHT: 187.39 LBS | TEMPERATURE: 98.6 F | SYSTOLIC BLOOD PRESSURE: 112 MMHG | DIASTOLIC BLOOD PRESSURE: 76 MMHG | HEART RATE: 104 BPM | RESPIRATION RATE: 18 BRPM | OXYGEN SATURATION: 100 %

## 2020-05-19 LAB
ANISOCYTOSIS BLD QL: SLIGHT — SIGNIFICANT CHANGE UP
BASOPHILS # BLD AUTO: 0.52 K/UL — HIGH (ref 0–0.2)
BASOPHILS NFR BLD AUTO: 2 % — SIGNIFICANT CHANGE UP (ref 0–2)
BLASTS # FLD: 18 % — HIGH (ref 0–0)
DACRYOCYTES BLD QL SMEAR: SLIGHT — SIGNIFICANT CHANGE UP
ELLIPTOCYTES BLD QL SMEAR: SLIGHT — SIGNIFICANT CHANGE UP
EOSINOPHIL # BLD AUTO: 0 K/UL — SIGNIFICANT CHANGE UP (ref 0–0.5)
EOSINOPHIL NFR BLD AUTO: 0 % — SIGNIFICANT CHANGE UP (ref 0–6)
GIANT PLATELETS BLD QL SMEAR: PRESENT — SIGNIFICANT CHANGE UP
HCT VFR BLD CALC: 37.7 % — LOW (ref 39–50)
HGB BLD-MCNC: 12 G/DL — LOW (ref 13–17)
LG PLATELETS BLD QL AUTO: SIGNIFICANT CHANGE UP
LYMPHOCYTES # BLD AUTO: 1.82 K/UL — SIGNIFICANT CHANGE UP (ref 1–3.3)
LYMPHOCYTES # BLD AUTO: 7 % — LOW (ref 13–44)
MACROCYTES BLD QL: SLIGHT — SIGNIFICANT CHANGE UP
MCHC RBC-ENTMCNC: 27.5 PG — SIGNIFICANT CHANGE UP (ref 27–34)
MCHC RBC-ENTMCNC: 31.8 GM/DL — LOW (ref 32–36)
MCV RBC AUTO: 86.3 FL — SIGNIFICANT CHANGE UP (ref 80–100)
METAMYELOCYTES # FLD: 5 % — HIGH (ref 0–0)
MICROCYTES BLD QL: SLIGHT — SIGNIFICANT CHANGE UP
MONOCYTES # BLD AUTO: 3.9 K/UL — HIGH (ref 0–0.9)
MONOCYTES NFR BLD AUTO: 15 % — HIGH (ref 2–14)
MYELOCYTES NFR BLD: 10 % — HIGH (ref 0–0)
NEUTROPHILS # BLD AUTO: 11.19 K/UL — HIGH (ref 1.8–7.4)
NEUTROPHILS NFR BLD AUTO: 41 % — LOW (ref 43–77)
NEUTS BAND # BLD: 2 % — SIGNIFICANT CHANGE UP (ref 0–8)
NRBC # BLD: 3 /100 — HIGH (ref 0–0)
NRBC # BLD: SIGNIFICANT CHANGE UP /100 WBCS (ref 0–0)
PLAT MORPH BLD: ABNORMAL
PLATELET # BLD AUTO: 255 K/UL — SIGNIFICANT CHANGE UP (ref 150–400)
POIKILOCYTOSIS BLD QL AUTO: SLIGHT — SIGNIFICANT CHANGE UP
POLYCHROMASIA BLD QL SMEAR: SLIGHT — SIGNIFICANT CHANGE UP
RBC # BLD: 4.37 M/UL — SIGNIFICANT CHANGE UP (ref 4.2–5.8)
RBC # FLD: 16.7 % — HIGH (ref 10.3–14.5)
RBC BLD AUTO: ABNORMAL
WBC # BLD: 26.02 K/UL — HIGH (ref 3.8–10.5)
WBC # FLD AUTO: 26.02 K/UL — HIGH (ref 3.8–10.5)

## 2020-05-19 PROCEDURE — 88341 IMHCHEM/IMCYTCHM EA ADD ANTB: CPT

## 2020-05-19 PROCEDURE — 88305 TISSUE EXAM BY PATHOLOGIST: CPT | Mod: 26

## 2020-05-19 PROCEDURE — 88342 IMHCHEM/IMCYTCHM 1ST ANTB: CPT | Mod: 26

## 2020-05-19 PROCEDURE — 85097 BONE MARROW INTERPRETATION: CPT

## 2020-05-19 PROCEDURE — 88313 SPECIAL STAINS GROUP 2: CPT | Mod: 26

## 2020-05-19 PROCEDURE — 88189 FLOWCYTOMETRY/READ 16 & >: CPT | Mod: 59

## 2020-05-19 PROCEDURE — 88341 IMHCHEM/IMCYTCHM EA ADD ANTB: CPT | Mod: 26

## 2020-05-19 PROCEDURE — 88184 FLOWCYTOMETRY/ TC 1 MARKER: CPT

## 2020-05-19 PROCEDURE — 87205 SMEAR GRAM STAIN: CPT

## 2020-05-19 PROCEDURE — 88185 FLOWCYTOMETRY/TC ADD-ON: CPT

## 2020-05-19 PROCEDURE — 38222 DX BONE MARROW BX & ASPIR: CPT | Mod: RT

## 2020-05-19 PROCEDURE — 88313 SPECIAL STAINS GROUP 2: CPT

## 2020-05-19 PROCEDURE — 88305 TISSUE EXAM BY PATHOLOGIST: CPT

## 2020-05-27 NOTE — PROCEDURE
[Bone Marrow Biopsy] : bone marrow biopsy [Patient] : the patient [Bone Marrow Aspiration] : bone marrow aspiration  [Laterality verified and correct site marked] : laterality verified and correct site marked [Patient identification verified] : patient identification verified [Procedure verified and consent obtained] : procedure verified and consent obtained [Correct positioning] : correct positioning [Left lateral decibitus position] : left lateral decibitus position [Right] : site: right [Superior iliac spine was identified] : the superior iliac spine was identified. [The right posterior iliac crest was prepped with betadine and draped, using sterile technique.] : The right posterior iliac crest was prepped with betadine and draped, using sterile technique. [Lidocaine was injected and into the periosteum overlying the site.] : Lidocaine was injected and into the periosteum overlying the site. [Aspirate] : aspirate [Cytogenetics] : cytogenetics [FISH] : FISH [Flow Cytometry] : flow cytometry [Other ___] : [unfilled] [Biopsy] : biopsy [] : The patient was instructed to remove the bandage the following AM. The patient may bathe. Acetaminophen may be taken for discomfort, as per package directions.If there are any other problems, the patient was instructed to call the office. The patient verbalized understanding, and is aware of the office contact numbers. [FreeTextEntry1] : ET, secondary myelofibrosis, monocytosis, increasing blasts, R/O MDS/AML [FreeTextEntry2] : Medications and allergies reviewed. CBC reviewed.  Increasing blasts on peripheral smear. Patient could not lie prone due to back pain.  Patient placed in left lateral decub position. 9cc of 1 percent lidocaine injected to achieve analgesia.  Aspirate and biopsy obtained. Aspirate was difficult to obtain- only 2-3cc obtained.  Hemostasis achieved.  DSD placed. Patient tolerated procedure well.  Labels reviewed. Patient advised to call in 1 week for test results.\par

## 2020-06-01 LAB
CHROM ANALY OVERALL INTERP SPEC-IMP: SIGNIFICANT CHANGE UP
HEMATOPATHOLOGY REPORT: SIGNIFICANT CHANGE UP
TM INTERPRETATION: SIGNIFICANT CHANGE UP

## 2020-06-02 ENCOUNTER — LABORATORY RESULT (OUTPATIENT)
Age: 70
End: 2020-06-02

## 2020-06-09 ENCOUNTER — LABORATORY RESULT (OUTPATIENT)
Age: 70
End: 2020-06-09

## 2020-06-09 ENCOUNTER — APPOINTMENT (OUTPATIENT)
Dept: CARDIOLOGY | Facility: CLINIC | Age: 70
End: 2020-06-09
Payer: MEDICARE

## 2020-06-09 ENCOUNTER — NON-APPOINTMENT (OUTPATIENT)
Age: 70
End: 2020-06-09

## 2020-06-09 VITALS
SYSTOLIC BLOOD PRESSURE: 118 MMHG | HEART RATE: 83 BPM | WEIGHT: 180 LBS | TEMPERATURE: 96.9 F | OXYGEN SATURATION: 97 % | DIASTOLIC BLOOD PRESSURE: 80 MMHG | BODY MASS INDEX: 26.58 KG/M2

## 2020-06-09 VITALS — DIASTOLIC BLOOD PRESSURE: 80 MMHG | SYSTOLIC BLOOD PRESSURE: 120 MMHG

## 2020-06-09 DIAGNOSIS — Z00.00 ENCOUNTER FOR GENERAL ADULT MEDICAL EXAMINATION W/OUT ABNORMAL FINDINGS: ICD-10-CM

## 2020-06-09 DIAGNOSIS — R97.20 ELEVATED PROSTATE, SPECIFIC ANTIGEN [PSA]: ICD-10-CM

## 2020-06-09 PROCEDURE — 93000 ELECTROCARDIOGRAM COMPLETE: CPT

## 2020-06-09 PROCEDURE — 93040 RHYTHM ECG WITH REPORT: CPT | Mod: 59

## 2020-06-09 PROCEDURE — 99214 OFFICE O/P EST MOD 30 MIN: CPT

## 2020-06-09 PROCEDURE — 36415 COLL VENOUS BLD VENIPUNCTURE: CPT

## 2020-06-09 NOTE — REASON FOR VISIT
[Coronary Artery Disease] : coronary artery disease [Follow-Up - Clinic] : a clinic follow-up of [Chest Pain] : chest pain [Palpitations] : palpitations [FreeTextEntry1] : tachycardia

## 2020-06-09 NOTE — REASON FOR VISIT
[Follow-Up - Clinic] : a clinic follow-up of [Coronary Artery Disease] : coronary artery disease [Hypertension] : hypertension [Palpitations] : palpitations

## 2020-06-10 ENCOUNTER — NON-APPOINTMENT (OUTPATIENT)
Age: 70
End: 2020-06-10

## 2020-06-11 ENCOUNTER — RX RENEWAL (OUTPATIENT)
Age: 70
End: 2020-06-11

## 2020-06-12 ENCOUNTER — TRANSCRIPTION ENCOUNTER (OUTPATIENT)
Age: 70
End: 2020-06-12

## 2020-06-12 LAB
ALBUMIN SERPL ELPH-MCNC: 5 G/DL
ALP BLD-CCNC: 163 U/L
ALT SERPL-CCNC: 39 U/L
ANION GAP SERPL CALC-SCNC: 19 MMOL/L
AST SERPL-CCNC: 51 U/L
BASOPHILS # BLD AUTO: 0.6 K/UL
BASOPHILS NFR BLD AUTO: 1.8 %
BILIRUB SERPL-MCNC: 1.1 MG/DL
BUN SERPL-MCNC: 16 MG/DL
CALCIUM SERPL-MCNC: 10.1 MG/DL
CHLORIDE SERPL-SCNC: 99 MMOL/L
CHOLEST SERPL-MCNC: 105 MG/DL
CHOLEST/HDLC SERPL: 4 RATIO
CK SERPL-CCNC: 346 U/L
CO2 SERPL-SCNC: 26 MMOL/L
CREAT SERPL-MCNC: 0.93 MG/DL
EOSINOPHIL # BLD AUTO: 0 K/UL
EOSINOPHIL NFR BLD AUTO: 0 %
ESTIMATED AVERAGE GLUCOSE: 120 MG/DL
GLUCOSE SERPL-MCNC: 106 MG/DL
HBA1C MFR BLD HPLC: 5.8 %
HCT VFR BLD CALC: 38.3 %
HDLC SERPL-MCNC: 27 MG/DL
HGB BLD-MCNC: 11.8 G/DL
LDLC SERPL CALC-MCNC: 48 MG/DL
LDLC SERPL DIRECT ASSAY-MCNC: 49 MG/DL
LYMPHOCYTES # BLD AUTO: 2.68 K/UL
LYMPHOCYTES NFR BLD AUTO: 8 %
MAN DIFF?: NORMAL
MCHC RBC-ENTMCNC: 27.8 PG
MCHC RBC-ENTMCNC: 30.8 GM/DL
MCV RBC AUTO: 90.1 FL
MONOCYTES # BLD AUTO: 5.92 K/UL
MONOCYTES NFR BLD AUTO: 17.7 %
NEUTROPHILS # BLD AUTO: 16.6 K/UL
NEUTROPHILS NFR BLD AUTO: 49.6 %
PLATELET # BLD AUTO: 314 K/UL
POTASSIUM SERPL-SCNC: 4.7 MMOL/L
PROT SERPL-MCNC: 7.2 G/DL
RBC # BLD: 4.25 M/UL
RBC # FLD: 17 %
SARS-COV-2 IGG SERPL IA-ACNC: 0.31 INDEX
SARS-COV-2 IGG SERPL QL IA: NEGATIVE
SODIUM SERPL-SCNC: 143 MMOL/L
T3 SERPL-MCNC: 117 NG/DL
T3RU NFR SERPL: 1.1 TBI
T4 FREE SERPL-MCNC: 1.3 NG/DL
T4 SERPL-MCNC: 8.8 UG/DL
TRIGL SERPL-MCNC: 153 MG/DL
TSH SERPL-ACNC: 2.81 UIU/ML
WBC # FLD AUTO: 33.46 K/UL

## 2020-06-15 ENCOUNTER — TRANSCRIPTION ENCOUNTER (OUTPATIENT)
Age: 70
End: 2020-06-15

## 2020-06-26 ENCOUNTER — OUTPATIENT (OUTPATIENT)
Dept: OUTPATIENT SERVICES | Facility: HOSPITAL | Age: 70
LOS: 1 days | Discharge: HOME | End: 2020-06-26

## 2020-06-26 DIAGNOSIS — D47.3 ESSENTIAL (HEMORRHAGIC) THROMBOCYTHEMIA: ICD-10-CM

## 2020-06-27 NOTE — HISTORY OF PRESENT ILLNESS
[FreeTextEntry1] : Has occasional chest discomfort described as a tightness, central emptiness, hunger, dizziness across his chest, unrelated to exertion, eating, position, or breathing, and not associated with shortness of breath, palpitations, or dizziness.  It typically lasts several minutes to an hour.\par His heart rates that ranged from the mid 80s to high 90s and occasionally go up to 115.\par Notes rare rare pounding in his chest. \par His PSA was 19; he is seeing Dr. Roman.\par He has leg cramps nightly.

## 2020-06-30 ENCOUNTER — RESULT REVIEW (OUTPATIENT)
Age: 70
End: 2020-06-30

## 2020-06-30 ENCOUNTER — APPOINTMENT (OUTPATIENT)
Dept: HEMATOLOGY ONCOLOGY | Facility: CLINIC | Age: 70
End: 2020-06-30
Payer: MEDICARE

## 2020-06-30 VITALS
RESPIRATION RATE: 16 BRPM | TEMPERATURE: 99.6 F | WEIGHT: 183.84 LBS | DIASTOLIC BLOOD PRESSURE: 82 MMHG | BODY MASS INDEX: 27.15 KG/M2 | OXYGEN SATURATION: 98 % | SYSTOLIC BLOOD PRESSURE: 116 MMHG | HEART RATE: 88 BPM

## 2020-06-30 LAB
ANISOCYTOSIS BLD QL: SLIGHT — SIGNIFICANT CHANGE UP
BASOPHILS # BLD AUTO: 0.87 K/UL — HIGH (ref 0–0.2)
BASOPHILS NFR BLD AUTO: 3 % — HIGH (ref 0–2)
BLASTS # FLD: 5 % — HIGH (ref 0–0)
DACRYOCYTES BLD QL SMEAR: SLIGHT — SIGNIFICANT CHANGE UP
ELLIPTOCYTES BLD QL SMEAR: SLIGHT — SIGNIFICANT CHANGE UP
EOSINOPHIL # BLD AUTO: 0.29 K/UL — SIGNIFICANT CHANGE UP (ref 0–0.5)
EOSINOPHIL NFR BLD AUTO: 1 % — SIGNIFICANT CHANGE UP (ref 0–6)
HCT VFR BLD CALC: 35.1 % — LOW (ref 39–50)
HGB BLD-MCNC: 11.1 G/DL — LOW (ref 13–17)
HYPOCHROMIA BLD QL: SLIGHT — SIGNIFICANT CHANGE UP
LYMPHOCYTES # BLD AUTO: 2.61 K/UL — SIGNIFICANT CHANGE UP (ref 1–3.3)
LYMPHOCYTES # BLD AUTO: 9 % — LOW (ref 13–44)
MCHC RBC-ENTMCNC: 27.7 PG — SIGNIFICANT CHANGE UP (ref 27–34)
MCHC RBC-ENTMCNC: 31.6 GM/DL — LOW (ref 32–36)
MCV RBC AUTO: 87.5 FL — SIGNIFICANT CHANGE UP (ref 80–100)
METAMYELOCYTES # FLD: 17 % — HIGH (ref 0–0)
MICROCYTES BLD QL: SLIGHT — SIGNIFICANT CHANGE UP
MONOCYTES # BLD AUTO: 4.35 K/UL — HIGH (ref 0–0.9)
MONOCYTES NFR BLD AUTO: 15 % — HIGH (ref 2–14)
MYELOCYTES NFR BLD: 13 % — HIGH (ref 0–0)
NEUTROPHILS # BLD AUTO: 10.45 K/UL — HIGH (ref 1.8–7.4)
NEUTROPHILS NFR BLD AUTO: 24 % — LOW (ref 43–77)
NEUTS BAND # BLD: 12 % — HIGH (ref 0–8)
NRBC # BLD: 5 /100 — HIGH (ref 0–0)
NRBC # BLD: SIGNIFICANT CHANGE UP /100 WBCS (ref 0–0)
PLAT MORPH BLD: NORMAL — SIGNIFICANT CHANGE UP
PLATELET # BLD AUTO: 257 K/UL — SIGNIFICANT CHANGE UP (ref 150–400)
POIKILOCYTOSIS BLD QL AUTO: SLIGHT — SIGNIFICANT CHANGE UP
POLYCHROMASIA BLD QL SMEAR: SLIGHT — SIGNIFICANT CHANGE UP
PROMYELOCYTES # FLD: 1 % — HIGH (ref 0–0)
RBC # BLD: 4.01 M/UL — LOW (ref 4.2–5.8)
RBC # FLD: 18 % — HIGH (ref 10.3–14.5)
RBC BLD AUTO: ABNORMAL
WBC # BLD: 29.03 K/UL — HIGH (ref 3.8–10.5)
WBC # FLD AUTO: 29.03 K/UL — HIGH (ref 3.8–10.5)

## 2020-06-30 PROCEDURE — 99214 OFFICE O/P EST MOD 30 MIN: CPT

## 2020-06-30 NOTE — HISTORY OF PRESENT ILLNESS
[Disease:__________________________] : Disease: [unfilled] [de-identified] : 4/2019 Secondary myelofibrosis with monocytosis , fibrosis grade 2-3/3; 46,XY, del(13)(q12q14); NGS + MPL, TET2\par 5/20 MBUS + dim lambda, CD 5, 19, 20, 23, 38\par 5/20 Marrow NGS + TET2, MPL, CHEK2, CUX1, PHF6 [de-identified] : Has diffuse mild myalgias and arthralgias for past 2 weeks. Advil gives relief. Atorvastatin held.  His spleen varies in size. No abdominal pain, early satiety. His pruritus has been tolerable, limited and stable.. No other complaints. He notes no night sweats, shortness of breath, chest pain, headaches, visual problems, abdominal pain, bleeding, bruising, leg pain/swelling, foot pain, BRBPR, melena, weight loss..  [de-identified] : JAK2, bcr abl, calreticulin negative\par mpl exon 10 +

## 2020-06-30 NOTE — ASSESSMENT
[FreeTextEntry1] : 70 year old male with mpl+ ET who has now evolved secondary myelofibrosis with monocytosis. 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 He has clinically stabilized. 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 The latter is not clinically significant.\par \par Plan:\par Observe\par ASA 81 mg daily - to use enteric coated\par Minimize Advil use and separate from ASA by at least 2 hours\par RTC 2 months\par \par  [Palliative Care Plan] : not applicable at this time

## 2020-06-30 NOTE — PHYSICAL EXAM
[Restricted in physically strenuous activity but ambulatory and able to carry out work of a light or sedentary nature] : Status 1- Restricted in physically strenuous activity but ambulatory and able to carry out work of a light or sedentary nature, e.g., light house work, office work [Normal] : affect appropriate [de-identified] : BS normal. Soft, nontender. Spleen palpable 6 cm below LCM-AAL. No hepatomegaly, masses. [de-identified] : 3 x 3 cm lipoma on posterior left upper extremity

## 2020-06-30 NOTE — REVIEW OF SYSTEMS
[Muscle Pain] : muscle pain [Joint Pain] : joint pain [Negative] : Allergic/Immunologic [de-identified] : Pruritus

## 2020-07-01 LAB — CK SERPL-CCNC: 201 U/L

## 2020-07-07 ENCOUNTER — RECORD ABSTRACTING (OUTPATIENT)
Age: 70
End: 2020-07-07

## 2020-07-08 ENCOUNTER — TRANSCRIPTION ENCOUNTER (OUTPATIENT)
Age: 70
End: 2020-07-08

## 2020-07-09 ENCOUNTER — APPOINTMENT (OUTPATIENT)
Dept: GASTROENTEROLOGY | Facility: CLINIC | Age: 70
End: 2020-07-09
Payer: MEDICARE

## 2020-07-09 VITALS
HEIGHT: 69 IN | DIASTOLIC BLOOD PRESSURE: 48 MMHG | WEIGHT: 179 LBS | BODY MASS INDEX: 26.51 KG/M2 | TEMPERATURE: 98.7 F | HEART RATE: 82 BPM | SYSTOLIC BLOOD PRESSURE: 138 MMHG

## 2020-07-09 DIAGNOSIS — R19.4 CHANGE IN BOWEL HABIT: ICD-10-CM

## 2020-07-09 DIAGNOSIS — R19.8 OTHER SPECIFIED SYMPTOMS AND SIGNS INVOLVING THE DIGESTIVE SYSTEM AND ABDOMEN: ICD-10-CM

## 2020-07-09 DIAGNOSIS — K58.9 IRRITABLE BOWEL SYNDROME W/OUT DIARRHEA: ICD-10-CM

## 2020-07-09 DIAGNOSIS — K63.3 ULCER OF INTESTINE: ICD-10-CM

## 2020-07-09 PROCEDURE — 36415 COLL VENOUS BLD VENIPUNCTURE: CPT

## 2020-07-09 PROCEDURE — 82274 ASSAY TEST FOR BLOOD FECAL: CPT | Mod: QW

## 2020-07-09 PROCEDURE — 99215 OFFICE O/P EST HI 40 MIN: CPT | Mod: 25

## 2020-07-10 PROBLEM — K63.3 ULCER OF ILEUM: Status: ACTIVE | Noted: 2019-07-30

## 2020-07-10 PROBLEM — R19.8 TENESMUS (RECTAL): Status: ACTIVE | Noted: 2019-06-06

## 2020-07-10 PROBLEM — R19.4 CHANGE IN BOWEL HABITS: Status: ACTIVE | Noted: 2019-06-06

## 2020-07-10 NOTE — REVIEW OF SYSTEMS
[Feeling Poorly] : feeling poorly [Recent Weight Loss (___ Lbs)] : recent [unfilled] ~Ulb weight loss [Feeling Tired] : feeling tired [Loss Of Hearing] : hearing loss [Nasal Discharge] : nasal discharge [PND] : PND [Abdominal Pain] : abdominal pain [Vomiting] : vomiting [Heartburn] : heartburn [Constipation] : constipation [Joint Pain] : joint pain [Joint Stiffness] : joint stiffness [Limb Pain] : limb pain [Negative] : Heme/Lymph [As Noted in HPI] : as noted in HPI

## 2020-07-10 NOTE — HISTORY OF PRESENT ILLNESS
[Heartburn] : denies heartburn [Nausea] : stable nausea [Diarrhea] : stable diarrhea [Vomiting] : stable vomiting [Abdominal Pain] : stable abdominal pain [Yellow Skin Or Eyes (Jaundice)] : denies jaundice [Constipation] : stable constipation [Abdominal Swelling] : denies abdominal swelling [Rectal Pain] : denies rectal pain [_________] : Performed [unfilled] [de-identified] : Lincoln presents to the office today for follow up with complaints of fever, abdominal pain, and nausea.  He was last seen in the office in 2019 when he underwent a colonoscopy with Dr. Riley.\par \par Since his last visit, he was hospitalized last fall for sepsis secondary to Klebsiella bacteremia from suspected prostatitis.  He reports having a "bad cold" this past Spring with a productive cough which lasted fro 2 days.  The symptoms started right before the coronavirus quarantine.  During the quarantine, he has been very careful to isolate himself.  However, he has been having episodic fevers, muscle and joint pains, and tightness in his chest, almost monthly which last for several days.  The symptoms get better when he takes Advil.  His bowel movements have been erratic and vary from day to day.  He still experiences tenesmus and constipation at times.  His last episode of fevers lasted for 3 weeks and includes sweats and rigors.  He also experienced loss of appetite, abdominal "hunger pangs" that was worse when he ate, and bloating.  He would also have nausea and retching while trying to have BMs.  The past two days, the abdominal pain has started to improve and for the first time in 3 weeks, he has been able to advance his diet.  Over the last 2 days, his stools have been a normal color but loose.  The patient attributes the change in BMs to being able to finally eat.  He denies any new medications, but did stop his statin 2 weeks ago due to fever and myalgias.  His recent labs revealed leukocytosis and mild anemia.  A recent bone marrow biopsy in May 2020 revealed myeloproliferative neoplasm with severe myelofibrosis, monocytosis and increasing blasts worrisome for disease progression.  On his colonoscopy in July 2019 (for rectal bleeding) a small ileal ulcer was seen with biopsies showing acute inflammation without chronicity. [de-identified] : cholelithiasis, splenomegaly [de-identified] : normal [de-identified] : ileal ulcer, diverticulosis

## 2020-07-10 NOTE — ASSESSMENT
[FreeTextEntry1] : 1. Fever, rigors, sweats, myalgias, chest discomfort, abdominal discomfort, nausea, irregular bowel habits.  Symptoms improving with NSAIDs.  Differential is broad and includes hematologic progression, infectious etiology, autoimmune/rheumatologic disorder.  GI symptoms may be exacerbated by NSAID use.  New onset IBD unlikely.\par 2. History of colonic polyps, with ileal ulcer (suspect aspirin-induced), pandiverticulosis and hemorrhoids at repeat colonoscopy July 2019; family history of colon cancer (grandfather) & colon polyps (mother, uncle).\par 3. Chronic GERD, with reflux esophagitis and Schatzki ring at last EGD November 2014.\par 4. Essential thrombocytosis, myeloproliferative disease, followed by Dr. Coker; maintained on ASA.\par 5. Status post surgery 1998 for right acoustic neuroma.\par 6. Overweight.\par 7. Hypertension.\par 8. Hyperlipidemia.\par 9. Mitral regurgitation, mild.\par 10. History of gout.\par 11. Status post lumbar discectomy, T&A, lateral patellar tendon repairs, vasectomy.\par 12. Allergic to codeine.\par \par Plan:\par - Recent labs reviewed.\par - CMP, ESR, CRP, Ferritin, autoimmune markers drawn today.\par - Check abdominal US.\par - Given improvement in symptoms over the past several days, continue to advance diet and monitor symptoms.

## 2020-07-10 NOTE — REASON FOR VISIT
[Follow-Up: _____] : a [unfilled] follow-up visit [FreeTextEntry1] : mild abdominal pain, nausea, vomiting x long term

## 2020-07-10 NOTE — PHYSICAL EXAM
[General Appearance - Alert] : alert [General Appearance - In No Acute Distress] : in no acute distress [General Appearance - Well Nourished] : well nourished [Sclera] : the sclera and conjunctiva were normal [Outer Ear] : the ears and nose were normal in appearance [Oropharynx] : the oropharynx was normal [Neck Appearance] : the appearance of the neck was normal [Neck Cervical Mass (___cm)] : no neck mass was observed [Jugular Venous Distention Increased] : there was no jugular-venous distention [Auscultation Breath Sounds / Voice Sounds] : lungs were clear to auscultation bilaterally [Heart Rate And Rhythm] : heart rate was normal and rhythm regular [Heart Sounds] : normal S1 and S2 [Heart Sounds Pericardial Friction Rub] : no pericardial rub [Edema] : there was no peripheral edema [Bowel Sounds] : normal bowel sounds [Abdomen Soft] : soft [Abdomen Tenderness] : non-tender [Abdomen Mass (___ Cm)] : no abdominal mass palpated [Abdomen Hernia] : no hernia was discovered [Normal Sphincter Tone] : normal sphincter tone [No Rectal Mass] : no rectal mass [Internal Hemorrhoid] : internal hemorrhoids [Prostate Size___ (Scale 0-4)] : prostate size was [unfilled] on a scale of 0-4 [Cervical Lymph Nodes Enlarged Posterior Bilaterally] : posterior cervical [Cervical Lymph Nodes Enlarged Anterior Bilaterally] : anterior cervical [Supraclavicular Lymph Nodes Enlarged Bilaterally] : supraclavicular [Inguinal Lymph Nodes Enlarged Bilaterally] : inguinal [Axillary Lymph Nodes Enlarged Bilaterally] : axillary [Femoral Lymph Nodes Enlarged Bilaterally] : femoral [No CVA Tenderness] : no ~M costovertebral angle tenderness [No Spinal Tenderness] : no spinal tenderness [Abnormal Walk] : normal gait [Musculoskeletal - Swelling] : no joint swelling seen [Skin Color & Pigmentation] : normal skin color and pigmentation [] : no rash [Oriented To Time, Place, And Person] : oriented to person, place, and time [Impaired Insight] : insight and judgment were intact [Affect] : the affect was normal [Extraocular Movements] : extraocular movements were intact [No Focal Deficits] : no focal deficits [External Hemorrhoid] : no external hemorrhoids [Occult Blood Positive] : stool was negative for occult blood [Prostate Tenderness] : was not tender [FreeTextEntry1] : lumbar discectomy

## 2020-07-11 ENCOUNTER — OUTPATIENT (OUTPATIENT)
Dept: OUTPATIENT SERVICES | Facility: HOSPITAL | Age: 70
LOS: 1 days | End: 2020-07-11
Payer: MEDICARE

## 2020-07-11 ENCOUNTER — APPOINTMENT (OUTPATIENT)
Dept: ULTRASOUND IMAGING | Facility: CLINIC | Age: 70
End: 2020-07-11
Payer: MEDICARE

## 2020-07-11 DIAGNOSIS — R10.9 UNSPECIFIED ABDOMINAL PAIN: ICD-10-CM

## 2020-07-11 DIAGNOSIS — Z00.8 ENCOUNTER FOR OTHER GENERAL EXAMINATION: ICD-10-CM

## 2020-07-11 PROCEDURE — 76700 US EXAM ABDOM COMPLETE: CPT | Mod: 26

## 2020-07-11 PROCEDURE — 76700 US EXAM ABDOM COMPLETE: CPT

## 2020-07-16 ENCOUNTER — TRANSCRIPTION ENCOUNTER (OUTPATIENT)
Age: 70
End: 2020-07-16

## 2020-07-16 DIAGNOSIS — R79.89 OTHER SPECIFIED ABNORMAL FINDINGS OF BLOOD CHEMISTRY: ICD-10-CM

## 2020-07-16 LAB
ALBUMIN SERPL ELPH-MCNC: 4.5 G/DL
ALP BLD-CCNC: 166 U/L
ALT SERPL-CCNC: 42 U/L
ANA SER IF-ACNC: NEGATIVE
ANION GAP SERPL CALC-SCNC: 19 MMOL/L
AST SERPL-CCNC: 54 U/L
BILIRUB SERPL-MCNC: 0.6 MG/DL
BUN SERPL-MCNC: 34 MG/DL
CALCIUM SERPL-MCNC: 9.5 MG/DL
CHLORIDE SERPL-SCNC: 103 MMOL/L
CO2 SERPL-SCNC: 21 MMOL/L
CREAT SERPL-MCNC: 1.63 MG/DL
CRP SERPL-MCNC: 6.19 MG/DL
ERYTHROCYTE [SEDIMENTATION RATE] IN BLOOD BY WESTERGREN METHOD: 79 MM/HR
FERRITIN SERPL-MCNC: 1396 NG/ML
GLUCOSE SERPL-MCNC: 117 MG/DL
POTASSIUM SERPL-SCNC: 4.4 MMOL/L
PROT SERPL-MCNC: 7 G/DL
RHEUMATOID FACT SER QL: 12 IU/ML
SODIUM SERPL-SCNC: 143 MMOL/L

## 2020-07-29 ENCOUNTER — LABORATORY RESULT (OUTPATIENT)
Age: 70
End: 2020-07-29

## 2020-08-03 LAB
ALBUMIN SERPL ELPH-MCNC: 4.8 G/DL
ALP BLD-CCNC: 149 U/L
ALT SERPL-CCNC: 27 U/L
ANION GAP SERPL CALC-SCNC: 14 MMOL/L
AST SERPL-CCNC: 43 U/L
BASOPHILS # BLD AUTO: 1.05 K/UL
BASOPHILS NFR BLD AUTO: 2.7 %
BILIRUB SERPL-MCNC: 0.9 MG/DL
BUN SERPL-MCNC: 14 MG/DL
CALCIUM SERPL-MCNC: 9.6 MG/DL
CHLORIDE SERPL-SCNC: 102 MMOL/L
CO2 SERPL-SCNC: 27 MMOL/L
CREAT SERPL-MCNC: 1.05 MG/DL
CRP SERPL-MCNC: 1.66 MG/DL
EOSINOPHIL # BLD AUTO: 0 K/UL
EOSINOPHIL NFR BLD AUTO: 0 %
ERYTHROCYTE [SEDIMENTATION RATE] IN BLOOD BY WESTERGREN METHOD: 55 MM/HR
FERRITIN SERPL-MCNC: 443 NG/ML
GGT SERPL-CCNC: 41 U/L
GLUCOSE SERPL-MCNC: 102 MG/DL
HBV CORE IGG+IGM SER QL: NONREACTIVE
HBV SURFACE AB SER QL: NONREACTIVE
HBV SURFACE AG SER QL: NONREACTIVE
HCT VFR BLD CALC: 37.2 %
HGB BLD-MCNC: 10.1 G/DL
IRON SATN MFR SERPL: 17 %
IRON SERPL-MCNC: 51 UG/DL
LYMPHOCYTES # BLD AUTO: 6.6 K/UL
LYMPHOCYTES NFR BLD AUTO: 17 %
MAN DIFF?: NORMAL
MCHC RBC-ENTMCNC: 26.9 PG
MCHC RBC-ENTMCNC: 27.2 GM/DL
MCV RBC AUTO: 99.2 FL
MITOCHONDRIA AB SER IF-ACNC: NORMAL
MONOCYTES # BLD AUTO: 9 K/UL
MONOCYTES NFR BLD AUTO: 23.2 %
NEUTROPHILS # BLD AUTO: 13.16 K/UL
NEUTROPHILS NFR BLD AUTO: 25 %
PLATELET # BLD AUTO: 367 K/UL
POTASSIUM SERPL-SCNC: 4.9 MMOL/L
PROT SERPL-MCNC: 6.5 G/DL
RBC # BLD: 3.75 M/UL
RBC # FLD: 20.2 %
SMOOTH MUSCLE AB SER QL IF: NORMAL
SODIUM SERPL-SCNC: 143 MMOL/L
TIBC SERPL-MCNC: 298 UG/DL
UIBC SERPL-MCNC: 246 UG/DL
WBC # FLD AUTO: 38.81 K/UL

## 2020-08-04 ENCOUNTER — TRANSCRIPTION ENCOUNTER (OUTPATIENT)
Age: 70
End: 2020-08-04

## 2020-08-18 ENCOUNTER — OUTPATIENT (OUTPATIENT)
Dept: OUTPATIENT SERVICES | Facility: HOSPITAL | Age: 70
LOS: 1 days | Discharge: HOME | End: 2020-08-18

## 2020-08-18 DIAGNOSIS — D47.3 ESSENTIAL (HEMORRHAGIC) THROMBOCYTHEMIA: ICD-10-CM

## 2020-08-25 ENCOUNTER — RESULT REVIEW (OUTPATIENT)
Age: 70
End: 2020-08-25

## 2020-08-25 ENCOUNTER — APPOINTMENT (OUTPATIENT)
Dept: HEMATOLOGY ONCOLOGY | Facility: CLINIC | Age: 70
End: 2020-08-25
Payer: MEDICARE

## 2020-08-25 VITALS
HEART RATE: 84 BPM | SYSTOLIC BLOOD PRESSURE: 133 MMHG | WEIGHT: 174.82 LBS | BODY MASS INDEX: 25.89 KG/M2 | DIASTOLIC BLOOD PRESSURE: 83 MMHG | HEIGHT: 68.82 IN | RESPIRATION RATE: 16 BRPM | TEMPERATURE: 98.3 F | OXYGEN SATURATION: 99 %

## 2020-08-25 LAB
ANISOCYTOSIS BLD QL: SLIGHT — SIGNIFICANT CHANGE UP
BASOPHILS # BLD AUTO: 0 K/UL — SIGNIFICANT CHANGE UP (ref 0–0.2)
BASOPHILS NFR BLD AUTO: 0 % — SIGNIFICANT CHANGE UP (ref 0–2)
BLASTS # FLD: 9 % — HIGH (ref 0–0)
DACRYOCYTES BLD QL SMEAR: SLIGHT — SIGNIFICANT CHANGE UP
ELLIPTOCYTES BLD QL SMEAR: SLIGHT — SIGNIFICANT CHANGE UP
EOSINOPHIL # BLD AUTO: 0 K/UL — SIGNIFICANT CHANGE UP (ref 0–0.5)
EOSINOPHIL NFR BLD AUTO: 0 % — SIGNIFICANT CHANGE UP (ref 0–6)
HCT VFR BLD CALC: 31.5 % — LOW (ref 39–50)
HGB BLD-MCNC: 9.7 G/DL — LOW (ref 13–17)
HYPOCHROMIA BLD QL: SLIGHT — SIGNIFICANT CHANGE UP
LYMPHOCYTES # BLD AUTO: 11.56 K/UL — HIGH (ref 1–3.3)
LYMPHOCYTES # BLD AUTO: 16 % — SIGNIFICANT CHANGE UP (ref 13–44)
MCHC RBC-ENTMCNC: 27.5 PG — SIGNIFICANT CHANGE UP (ref 27–34)
MCHC RBC-ENTMCNC: 30.8 GM/DL — LOW (ref 32–36)
MCV RBC AUTO: 89.2 FL — SIGNIFICANT CHANGE UP (ref 80–100)
METAMYELOCYTES # FLD: 1 % — HIGH (ref 0–0)
MICROCYTES BLD QL: SLIGHT — SIGNIFICANT CHANGE UP
MONOCYTES # BLD AUTO: 5.78 K/UL — HIGH (ref 0–0.9)
MONOCYTES NFR BLD AUTO: 8 % — SIGNIFICANT CHANGE UP (ref 2–14)
MYELOCYTES NFR BLD: 4 % — HIGH (ref 0–0)
NEUTROPHILS # BLD AUTO: 43.34 K/UL — HIGH (ref 1.8–7.4)
NEUTROPHILS NFR BLD AUTO: 54 % — SIGNIFICANT CHANGE UP (ref 43–77)
NEUTS BAND # BLD: 6 % — SIGNIFICANT CHANGE UP (ref 0–8)
NRBC # BLD: 7 /100 — HIGH (ref 0–0)
NRBC # BLD: SIGNIFICANT CHANGE UP /100 WBCS (ref 0–0)
PLAT MORPH BLD: NORMAL — SIGNIFICANT CHANGE UP
PLATELET # BLD AUTO: 449 K/UL — HIGH (ref 150–400)
POIKILOCYTOSIS BLD QL AUTO: SLIGHT — SIGNIFICANT CHANGE UP
POLYCHROMASIA BLD QL SMEAR: SLIGHT — SIGNIFICANT CHANGE UP
PROMYELOCYTES # FLD: 2 % — HIGH (ref 0–0)
RBC # BLD: 3.53 M/UL — LOW (ref 4.2–5.8)
RBC # FLD: 18.4 % — HIGH (ref 10.3–14.5)
RBC BLD AUTO: ABNORMAL
WBC # BLD: 72.24 K/UL — CRITICAL HIGH (ref 3.8–10.5)
WBC # FLD AUTO: 72.24 K/UL — CRITICAL HIGH (ref 3.8–10.5)

## 2020-08-25 PROCEDURE — 99214 OFFICE O/P EST MOD 30 MIN: CPT

## 2020-08-26 NOTE — HISTORY OF PRESENT ILLNESS
[Disease:__________________________] : Disease: [unfilled] [de-identified] : JAK2, bcr abl, calreticulin negative\par mpl exon 10 + [de-identified] : 4/2019 Secondary myelofibrosis with monocytosis , fibrosis grade 2-3/3; 46,XY, del(13)(q12q14); NGS + MPL, TET2\par 5/20 MBUS + dim lambda, CD 5, 19, 20, 23, 38\par 5/20 Marrow NGS + TET2, MPL, CHEK2, CUX1, PHF6 [de-identified] : Pt states arthralgias have improved slightly and he has had to take less Advil.  Has had lower abdominal pain-moves bowels about 5 times per day but denies diarrhea or loose stools. Pt has persistent tenesmus.  Had seen GI-workup was negative.  His spleen size has increased slightly.  His pruritus persists but is tolerable.  He notes no night sweats, shortness of breath, chest pain, headaches, visual problems, abdominal pain, bleeding, bruising, leg pain/swelling, foot pain, BRBPR, melena.  Has had weight loss of about 20 lbs in last 6 months.  Reports good appetite and po intake.

## 2020-08-26 NOTE — ASSESSMENT
[Palliative Care Plan] : not applicable at this time [FreeTextEntry1] : 70 year old male with mpl+ ET who has now evolved secondary myelofibrosis with monocytosis. 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 He has clinically stabilized. 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 The latter is not clinically significant.  \par \par Weight loss, worsening anemia-12->11.8->11.1->10.1->9.7 since 5/20, increased (9%) blasts in peripheral blood, increased WBC (72.24)  indicate progression to more aggressive disease that warrants treatment. DIPSS score-6, high risk category  Treatment options explored with pt including Jakafi, SCT and clinical trials.  If pt choses SCT option, process should be started soon.    \par \par Plan:\par CBC, CMP, LDH, Mag, Phophate-done today \par Minimize Advil use and separate from ASA by at least 2 hours\par Will reach out to Dr Gonzales at [facility] about possible clinical trial options; also to transplant team at [facility].\par Referral made to nutrition.\par Dr Coker will follow up with pt once Dr Gonzales and SCT team at [facility] are contacted to explore treatment options.\par \par

## 2020-08-26 NOTE — PHYSICAL EXAM
[Restricted in physically strenuous activity but ambulatory and able to carry out work of a light or sedentary nature] : Status 1- Restricted in physically strenuous activity but ambulatory and able to carry out work of a light or sedentary nature, e.g., light house work, office work [Normal] : affect appropriate [de-identified] : BS normal. Soft, nontender. Spleen palpable 7 cm below LCM-AAL. No hepatomegaly, masses. [de-identified] : 3 x 3 cm lipoma on posterior left upper extremity

## 2020-08-26 NOTE — RESULTS/DATA
[FreeTextEntry1] : CBC 8/25/20 WBC 72.24 Hgb 9.7 Hct 31.5 Plts 449 Blasts 9%\par \par 5/19/20\par BM asp/bx: MPN with severe myelofibrosis, monocytosis. 3% blasts. Small MBUS population.\par

## 2020-08-26 NOTE — REVIEW OF SYSTEMS
[Joint Pain] : joint pain [Muscle Pain] : muscle pain [Negative] : Allergic/Immunologic [Recent Change In Weight] : ~T recent weight change [FreeTextEntry2] : 20 lb weight loss in last 6 months  [FreeTextEntry9] : arthralgias are slightly improved [de-identified] : Pruritus

## 2020-09-01 ENCOUNTER — NON-APPOINTMENT (OUTPATIENT)
Age: 70
End: 2020-09-01

## 2020-09-01 ENCOUNTER — APPOINTMENT (OUTPATIENT)
Dept: CARDIOLOGY | Facility: CLINIC | Age: 70
End: 2020-09-01
Payer: MEDICARE

## 2020-09-01 VITALS
SYSTOLIC BLOOD PRESSURE: 138 MMHG | TEMPERATURE: 97.1 F | DIASTOLIC BLOOD PRESSURE: 80 MMHG | OXYGEN SATURATION: 99 % | HEART RATE: 79 BPM

## 2020-09-01 VITALS — BODY MASS INDEX: 25.83 KG/M2 | WEIGHT: 174 LBS

## 2020-09-01 DIAGNOSIS — R73.09 OTHER ABNORMAL GLUCOSE: ICD-10-CM

## 2020-09-01 PROCEDURE — 93000 ELECTROCARDIOGRAM COMPLETE: CPT

## 2020-09-01 PROCEDURE — 99214 OFFICE O/P EST MOD 30 MIN: CPT

## 2020-09-01 PROCEDURE — 36415 COLL VENOUS BLD VENIPUNCTURE: CPT

## 2020-09-01 PROCEDURE — G0008: CPT

## 2020-09-01 PROCEDURE — 90662 IIV NO PRSV INCREASED AG IM: CPT

## 2020-09-02 ENCOUNTER — RESULT REVIEW (OUTPATIENT)
Age: 70
End: 2020-09-02

## 2020-09-02 ENCOUNTER — APPOINTMENT (OUTPATIENT)
Dept: HEMATOLOGY ONCOLOGY | Facility: CLINIC | Age: 70
End: 2020-09-02
Payer: MEDICARE

## 2020-09-02 VITALS
BODY MASS INDEX: 25.8 KG/M2 | DIASTOLIC BLOOD PRESSURE: 74 MMHG | RESPIRATION RATE: 15 BRPM | SYSTOLIC BLOOD PRESSURE: 115 MMHG | TEMPERATURE: 98.5 F | OXYGEN SATURATION: 98 % | HEART RATE: 82 BPM | HEIGHT: 68.82 IN | WEIGHT: 174.16 LBS

## 2020-09-02 LAB
ALBUMIN SERPL ELPH-MCNC: 4.3 G/DL
ALBUMIN SERPL ELPH-MCNC: 4.6 G/DL
ALP BLD-CCNC: 138 U/L
ALP BLD-CCNC: 164 U/L
ALT SERPL-CCNC: 22 U/L
ALT SERPL-CCNC: 30 U/L
ANION GAP SERPL CALC-SCNC: 14 MMOL/L
ANION GAP SERPL CALC-SCNC: 14 MMOL/L
ANISOCYTOSIS BLD QL: SLIGHT — SIGNIFICANT CHANGE UP
AST SERPL-CCNC: 40 U/L
AST SERPL-CCNC: 48 U/L
BASOPHILS # BLD AUTO: 2.22 K/UL — HIGH (ref 0–0.2)
BASOPHILS NFR BLD AUTO: 5 % — HIGH (ref 0–2)
BILIRUB SERPL-MCNC: 0.6 MG/DL
BILIRUB SERPL-MCNC: 0.9 MG/DL
BLASTS # FLD: 5 % — HIGH (ref 0–0)
BUN SERPL-MCNC: 15 MG/DL
BUN SERPL-MCNC: 15 MG/DL
CALCIUM SERPL-MCNC: 9.3 MG/DL
CALCIUM SERPL-MCNC: 9.6 MG/DL
CHLORIDE SERPL-SCNC: 102 MMOL/L
CHLORIDE SERPL-SCNC: 103 MMOL/L
CK SERPL-CCNC: 107 U/L
CK SERPL-CCNC: 135 U/L
CO2 SERPL-SCNC: 26 MMOL/L
CO2 SERPL-SCNC: 27 MMOL/L
CREAT SERPL-MCNC: 0.93 MG/DL
CREAT SERPL-MCNC: 0.95 MG/DL
DACRYOCYTES BLD QL SMEAR: SLIGHT — SIGNIFICANT CHANGE UP
ELLIPTOCYTES BLD QL SMEAR: SLIGHT — SIGNIFICANT CHANGE UP
EOSINOPHIL # BLD AUTO: 0.44 K/UL — SIGNIFICANT CHANGE UP (ref 0–0.5)
EOSINOPHIL NFR BLD AUTO: 1 % — SIGNIFICANT CHANGE UP (ref 0–6)
GLUCOSE SERPL-MCNC: 102 MG/DL
GLUCOSE SERPL-MCNC: 113 MG/DL
HCT VFR BLD CALC: 32.4 % — LOW (ref 39–50)
HGB BLD-MCNC: 9.7 G/DL — LOW (ref 13–17)
HYPOCHROMIA BLD QL: SLIGHT — SIGNIFICANT CHANGE UP
LDH SERPL-CCNC: 1361 U/L
LDH SERPL-CCNC: 998 U/L
LYMPHOCYTES # BLD AUTO: 17 % — SIGNIFICANT CHANGE UP (ref 13–44)
LYMPHOCYTES # BLD AUTO: 7.56 K/UL — HIGH (ref 1–3.3)
MCHC RBC-ENTMCNC: 26.7 PG — LOW (ref 27–34)
MCHC RBC-ENTMCNC: 29.9 GM/DL — LOW (ref 32–36)
MCV RBC AUTO: 89.3 FL — SIGNIFICANT CHANGE UP (ref 80–100)
METAMYELOCYTES # FLD: 1 % — HIGH (ref 0–0)
MICROCYTES BLD QL: SLIGHT — SIGNIFICANT CHANGE UP
MONOCYTES # BLD AUTO: 4.45 K/UL — HIGH (ref 0–0.9)
MONOCYTES NFR BLD AUTO: 10 % — SIGNIFICANT CHANGE UP (ref 2–14)
MYELOCYTES NFR BLD: 3 % — HIGH (ref 0–0)
NEUTROPHILS # BLD AUTO: 25.34 K/UL — HIGH (ref 1.8–7.4)
NEUTROPHILS NFR BLD AUTO: 57 % — SIGNIFICANT CHANGE UP (ref 43–77)
NRBC # BLD: 1 /100 — HIGH (ref 0–0)
NRBC # BLD: SIGNIFICANT CHANGE UP /100 WBCS (ref 0–0)
PHOSPHATE SERPL-MCNC: 4.1 MG/DL
PLAT MORPH BLD: NORMAL — SIGNIFICANT CHANGE UP
PLATELET # BLD AUTO: 402 K/UL — HIGH (ref 150–400)
POIKILOCYTOSIS BLD QL AUTO: SLIGHT — SIGNIFICANT CHANGE UP
POLYCHROMASIA BLD QL SMEAR: SLIGHT — SIGNIFICANT CHANGE UP
POTASSIUM SERPL-SCNC: 4.7 MMOL/L
POTASSIUM SERPL-SCNC: 4.8 MMOL/L
PROMYELOCYTES # FLD: 1 % — HIGH (ref 0–0)
PROT SERPL-MCNC: 6.2 G/DL
PROT SERPL-MCNC: 6.4 G/DL
RBC # BLD: 3.63 M/UL — LOW (ref 4.2–5.8)
RBC # FLD: 18.3 % — HIGH (ref 10.3–14.5)
RBC BLD AUTO: ABNORMAL
SODIUM SERPL-SCNC: 142 MMOL/L
SODIUM SERPL-SCNC: 144 MMOL/L
URATE SERPL-MCNC: 6.8 MG/DL
URATE SERPL-MCNC: 6.9 MG/DL
WBC # BLD: 44.46 K/UL — CRITICAL HIGH (ref 3.8–10.5)
WBC # FLD AUTO: 44.46 K/UL — CRITICAL HIGH (ref 3.8–10.5)

## 2020-09-02 PROCEDURE — 99213 OFFICE O/P EST LOW 20 MIN: CPT

## 2020-09-02 RX ORDER — IBUPROFEN 200 MG/1
200 TABLET, COATED ORAL
Refills: 0 | Status: DISCONTINUED | COMMUNITY
Start: 2020-06-30 | End: 2020-09-02

## 2020-09-02 NOTE — HISTORY OF PRESENT ILLNESS
[de-identified] : 4/2019 Secondary myelofibrosis with monocytosis , fibrosis grade 2-3/3; 46,XY, del(13)(q12q14); NGS + MPL, TET2\par 5/20 MBUS + dim lambda, CD 5, 19, 20, 23, 38\par 5/20 Marrow NGS + TET2, MPL, CHEK2, CUX1, PHF6 [Disease:__________________________] : Disease: [unfilled] [de-identified] : Has been constipated. Feels fullness in LUQ with occasional early satiety. Scattered bony aches persist.  His pruritus has been tolerable, limited and stable.. No other complaints. He notes no night sweats, shortness of breath, chest pain, headaches, visual problems, bleeding, bruising, leg pain/swelling, foot pain, BRBPR, melena, recent weight loss. Had lost 20 lbs in past 6 months. [de-identified] : JAK2, bcr abl, calreticulin negative\par mpl exon 10 +

## 2020-09-02 NOTE — REVIEW OF SYSTEMS
[Recent Change In Weight] : ~T recent weight change [Abdominal Pain] : abdominal pain [Constipation] : constipation [Joint Pain] : joint pain [Negative] : Allergic/Immunologic

## 2020-09-02 NOTE — REASON FOR VISIT
[Follow-Up Visit] : a follow-up visit for [Myeloproliferative Disorder] : myeloproliferative disorder [Thrombocytosis] : thrombocytosis [Family Member] : family member

## 2020-09-02 NOTE — RESULTS/DATA
[FreeTextEntry1] : WBC 44,500 Hgb 9.7 Hct 32.4 Platelets 402,000  5 blasts, 1 pro, 3 myelo, 1 meta, 57P, 17L, 10M, 1 Eo, 5 Ba, 1 NRBC/100 WBC

## 2020-09-02 NOTE — ASSESSMENT
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 The latter is not clinically significant. \par \par Weight loss, worsening anemia-12->11.8->11.1->10.1->9.7 since 5/20, increased blasts in peripheral blood, increased WBC indicate progression to more aggressive disease that warrants treatment. DIPSS score-6, high risk category.  Treatment options explored with pt including Jakafi, alloSCT and clinical trials. Arrangements made for consultation at [facility] with Dr. Patrick Gonzales for potential clinical trial.\par \par Plan:\par Consultation with Dr Gonzales at [facility] about possible clinical trial options\par RTC to be arranged if needed\par

## 2020-09-12 LAB
ESTIMATED AVERAGE GLUCOSE: 114 MG/DL
HBA1C MFR BLD HPLC: 5.6 %

## 2020-09-13 ENCOUNTER — NON-APPOINTMENT (OUTPATIENT)
Age: 70
End: 2020-09-13

## 2020-09-19 ENCOUNTER — RX RENEWAL (OUTPATIENT)
Age: 70
End: 2020-09-19

## 2020-09-20 ENCOUNTER — RESULT CHARGE (OUTPATIENT)
Age: 70
End: 2020-09-20

## 2020-09-23 ENCOUNTER — RESULT REVIEW (OUTPATIENT)
Age: 70
End: 2020-09-23

## 2020-09-23 ENCOUNTER — OUTPATIENT (OUTPATIENT)
Dept: OUTPATIENT SERVICES | Facility: HOSPITAL | Age: 70
LOS: 1 days | Discharge: HOME | End: 2020-09-23

## 2020-09-23 ENCOUNTER — OUTPATIENT (OUTPATIENT)
Dept: OUTPATIENT SERVICES | Facility: HOSPITAL | Age: 70
LOS: 1 days | End: 2020-09-23
Payer: MEDICARE

## 2020-09-23 ENCOUNTER — APPOINTMENT (OUTPATIENT)
Dept: HEMATOLOGY ONCOLOGY | Facility: CLINIC | Age: 70
End: 2020-09-23
Payer: MEDICARE

## 2020-09-23 VITALS
SYSTOLIC BLOOD PRESSURE: 124 MMHG | HEART RATE: 91 BPM | BODY MASS INDEX: 25.47 KG/M2 | OXYGEN SATURATION: 96 % | TEMPERATURE: 97.5 F | DIASTOLIC BLOOD PRESSURE: 74 MMHG | WEIGHT: 171.96 LBS | HEIGHT: 68.82 IN | RESPIRATION RATE: 16 BRPM

## 2020-09-23 DIAGNOSIS — D64.9 ANEMIA, UNSPECIFIED: ICD-10-CM

## 2020-09-23 DIAGNOSIS — D47.3 ESSENTIAL (HEMORRHAGIC) THROMBOCYTHEMIA: ICD-10-CM

## 2020-09-23 PROCEDURE — 88341 IMHCHEM/IMCYTCHM EA ADD ANTB: CPT | Mod: 26,59

## 2020-09-23 PROCEDURE — 88313 SPECIAL STAINS GROUP 2: CPT | Mod: 26

## 2020-09-23 PROCEDURE — 38222 DX BONE MARROW BX & ASPIR: CPT | Mod: LT

## 2020-09-23 PROCEDURE — 88342 IMHCHEM/IMCYTCHM 1ST ANTB: CPT | Mod: 26,59

## 2020-09-23 PROCEDURE — 88189 FLOWCYTOMETRY/READ 16 & >: CPT

## 2020-09-23 PROCEDURE — 85097 BONE MARROW INTERPRETATION: CPT

## 2020-09-23 PROCEDURE — 88360 TUMOR IMMUNOHISTOCHEM/MANUAL: CPT | Mod: 26

## 2020-09-23 PROCEDURE — 88305 TISSUE EXAM BY PATHOLOGIST: CPT | Mod: 26

## 2020-09-23 NOTE — PROCEDURE
[Bone Marrow Biopsy] : bone marrow biopsy [Bone Marrow Aspiration] : bone marrow aspiration  [Patient] : the patient [Patient identification verified] : patient identification verified [Correct positioning] : correct positioning [Correct implant and/ or special equipment obtained] : correct impact and/ or special equipment obtained [Prone] : prone [The left posterior iliac crest was prepped with betadine and draped, using sterile technique.] : The left posterior iliac crest was prepped with betadine and draped, using sterile technique. [Lidocaine was injected and into the periosteum overlying the site.] : Lidocaine was injected and into the periosteum overlying the site. [Aspirate] : aspirate [Cytogenetics] : cytogenetics [FISH] : FISH [Other ___] : [unfilled] [Biopsy] : biopsy [Flow Cytometry] : flow cytometry [] : The patient was instructed to remove the bandage the following AM. The patient may bathe. Acetaminophen may be taken for discomfort, as per package directions.If there are any other problems, the patient was instructed to call the office. The patient verbalized understanding, and is aware of the office contact numbers. [FreeTextEntry1] : ET, R/O AML [FreeTextEntry2] : CBC done on 9/2.\par WBC 44.46\par Hgb 9.7\par Hct 32.4\par Plts 402\par BM bx and aspirate done.  Samples sent to [facility] [place]. \par \par

## 2020-09-23 NOTE — REASON FOR VISIT
[Bone Marrow Biopsy] : bone marrow biopsy [Bone Marrow Aspiration] : bone marrow aspiration [FreeTextEntry2] : ET, R/O AML

## 2020-10-08 ENCOUNTER — TRANSCRIPTION ENCOUNTER (OUTPATIENT)
Age: 70
End: 2020-10-08

## 2020-10-09 ENCOUNTER — RESULT REVIEW (OUTPATIENT)
Age: 70
End: 2020-10-09

## 2020-10-09 ENCOUNTER — APPOINTMENT (OUTPATIENT)
Dept: HEMATOLOGY ONCOLOGY | Facility: CLINIC | Age: 70
End: 2020-10-09
Payer: MEDICARE

## 2020-10-09 VITALS
RESPIRATION RATE: 16 BRPM | HEART RATE: 83 BPM | DIASTOLIC BLOOD PRESSURE: 89 MMHG | OXYGEN SATURATION: 99 % | WEIGHT: 170.86 LBS | TEMPERATURE: 87.4 F | SYSTOLIC BLOOD PRESSURE: 145 MMHG | BODY MASS INDEX: 25.36 KG/M2

## 2020-10-09 LAB
ANISOCYTOSIS BLD QL: SLIGHT — SIGNIFICANT CHANGE UP
BASOPHILS # BLD AUTO: 0 K/UL — SIGNIFICANT CHANGE UP (ref 0–0.2)
BASOPHILS NFR BLD AUTO: 0 % — SIGNIFICANT CHANGE UP (ref 0–2)
BLASTS # FLD: 7 % — HIGH (ref 0–0)
DACRYOCYTES BLD QL SMEAR: SLIGHT — SIGNIFICANT CHANGE UP
ELLIPTOCYTES BLD QL SMEAR: SLIGHT — SIGNIFICANT CHANGE UP
EOSINOPHIL # BLD AUTO: 0 K/UL — SIGNIFICANT CHANGE UP (ref 0–0.5)
EOSINOPHIL NFR BLD AUTO: 0 % — SIGNIFICANT CHANGE UP (ref 0–6)
GIANT PLATELETS BLD QL SMEAR: PRESENT — SIGNIFICANT CHANGE UP
HCT VFR BLD CALC: 31.9 % — LOW (ref 39–50)
HGB BLD-MCNC: 9.9 G/DL — LOW (ref 13–17)
LG PLATELETS BLD QL AUTO: SLIGHT — SIGNIFICANT CHANGE UP
LYMPHOCYTES # BLD AUTO: 12.67 K/UL — HIGH (ref 1–3.3)
LYMPHOCYTES # BLD AUTO: 14 % — SIGNIFICANT CHANGE UP (ref 13–44)
MCHC RBC-ENTMCNC: 27.3 PG — SIGNIFICANT CHANGE UP (ref 27–34)
MCHC RBC-ENTMCNC: 31 G/DL — LOW (ref 32–36)
MCV RBC AUTO: 87.9 FL — SIGNIFICANT CHANGE UP (ref 80–100)
METAMYELOCYTES # FLD: 4 % — HIGH (ref 0–0)
MICROCYTES BLD QL: SLIGHT — SIGNIFICANT CHANGE UP
MONOCYTES # BLD AUTO: 8.14 K/UL — HIGH (ref 0–0.9)
MONOCYTES NFR BLD AUTO: 9 % — SIGNIFICANT CHANGE UP (ref 2–14)
MYELOCYTES NFR BLD: 5 % — HIGH (ref 0–0)
NEUTROPHILS # BLD AUTO: 54.28 K/UL — HIGH (ref 1.8–7.4)
NEUTROPHILS NFR BLD AUTO: 53 % — SIGNIFICANT CHANGE UP (ref 43–77)
NEUTS BAND # BLD: 7 % — SIGNIFICANT CHANGE UP (ref 0–8)
NRBC # BLD: 3 /100 — HIGH (ref 0–0)
NRBC # BLD: SIGNIFICANT CHANGE UP /100 WBCS (ref 0–0)
PLAT MORPH BLD: NORMAL — SIGNIFICANT CHANGE UP
PLATELET # BLD AUTO: 553 K/UL — HIGH (ref 150–400)
POIKILOCYTOSIS BLD QL AUTO: SLIGHT — SIGNIFICANT CHANGE UP
PROMYELOCYTES # FLD: 1 % — HIGH (ref 0–0)
RBC # BLD: 3.63 M/UL — LOW (ref 4.2–5.8)
RBC # FLD: 18.1 % — HIGH (ref 10.3–14.5)
RBC BLD AUTO: ABNORMAL
WBC # BLD: 90.47 K/UL — CRITICAL HIGH (ref 3.8–10.5)
WBC # FLD AUTO: 90.47 K/UL — CRITICAL HIGH (ref 3.8–10.5)

## 2020-10-09 PROCEDURE — 99215 OFFICE O/P EST HI 40 MIN: CPT

## 2020-10-09 RX ORDER — NEOMYCIN AND POLYMYXIN B SULFATES AND DEXAMETHASONE 3.5; 10000; 1 MG/G; [IU]/G; MG/G
3.5-10000-0.1 OINTMENT OPHTHALMIC
Qty: 3 | Refills: 0 | Status: DISCONTINUED | COMMUNITY
Start: 2020-01-22 | End: 2020-10-09

## 2020-10-09 NOTE — RESULTS/DATA
[FreeTextEntry1] : WBC 90,500 Hgb 9.9 Hct 31.9 Platelets 553,000 7 blasts, 1 pro, 5 myelo, 7B, 53P, 14L, 9M, 3 NRBC/100WBC\par \par 9/23/20\par BM asp/biopsy: MPN with secondary myelofibrosis, 5% blasts, minute MBUS population. 46, XY, del 13 (q12q14)

## 2020-10-09 NOTE — HISTORY OF PRESENT ILLNESS
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 Has been constipated. Feels fullness in LUQ with occasional early satiety. Scattered bony aches persist.  His pruritus has been tolerable, limited and stable.. No other complaints. He notes no night sweats, shortness of breath, chest pain, headaches, visual problems, bleeding, bruising, leg pain/swelling, foot pain, BRBPR, melena, recent weight loss. Had lost 20 lbs in past 6 months. Had flu vaccine.

## 2020-10-09 NOTE — REVIEW OF SYSTEMS
[Fatigue] : fatigue [Abdominal Pain] : abdominal pain [Constipation] : constipation [Joint Pain] : joint pain [Negative] : Allergic/Immunologic

## 2020-10-09 NOTE — ASSESSMENT
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 The latter is not clinically significant. \par \par Weight loss, worsening anemia, increased blasts in peripheral blood and marrow, increased WBC indicate progression to more aggressive disease that warrants treatment. DIPSS score-6, high risk category. Treatment options explored with pt including Jakafi, alloSCT and clinical trials. Consultation at [facility] with Dr. Patrick Gonzales completed. Will begin therapy with Dacogen cycle 1 and then dacoge + Jakafi beginning cycle 2 while awaiting SCT.  Reviewed recommendation, discussed alternatives and toxicities in detail. All questions answered. Informed consent obtained. Informed consent also obtained for the Human Tissue Cell Bank.\par \par Plan:\par Dacogen 20 mg/m2 IV daily x 5 every 4 weeks. Start Jakafi 10 mg daily cycle 2.\par Allopurinol\par Reglan 10 po q6h prn nausea\par CMP, LDH, urate, PO4\par COVID swab\par Obtain recent hepatitis panel results\par 4-10 ml lavendar and 1 - 7 ml red to Tissue Bank.\par  [Palliative Care Plan] : not applicable at this time

## 2020-10-09 NOTE — CONSULT LETTER
[Dear  ___] : Dear ~PEDRO, [Courtesy Letter:] : I had the pleasure of seeing your patient, [unfilled], in my office today. [Sincerely,] : Sincerely, [FreeTextEntry2] : Patrick Gonzales MD [FreeTextEntry3] : Nilo\par Noam Coker M.D., FACP\par Professor of Medicine\par [employer]\par Associate Chief, Division of Hematology\par [facility]\par [facility]\par [address]\[place] [address]\par [phone]\par \par \par \par   [DrGuille  ___] : Dr. MONTES

## 2020-10-09 NOTE — PHYSICAL EXAM
[Restricted in physically strenuous activity but ambulatory and able to carry out work of a light or sedentary nature] : Status 1- Restricted in physically strenuous activity but ambulatory and able to carry out work of a light or sedentary nature, e.g., light house work, office work [Normal] : affect appropriate [de-identified] : BS normal. Soft, nontender. Spleen palpable 8 cm below LCM-AAL. No hepatomegaly, masses. [de-identified] : 3 x 3 cm lipoma on posterior left upper extremity

## 2020-10-12 ENCOUNTER — TRANSCRIPTION ENCOUNTER (OUTPATIENT)
Age: 70
End: 2020-10-12

## 2020-10-13 ENCOUNTER — RESULT REVIEW (OUTPATIENT)
Age: 70
End: 2020-10-13

## 2020-10-13 ENCOUNTER — APPOINTMENT (OUTPATIENT)
Dept: INFUSION THERAPY | Facility: HOSPITAL | Age: 70
End: 2020-10-13

## 2020-10-13 DIAGNOSIS — Z51.11 ENCOUNTER FOR ANTINEOPLASTIC CHEMOTHERAPY: ICD-10-CM

## 2020-10-13 DIAGNOSIS — R11.2 NAUSEA WITH VOMITING, UNSPECIFIED: ICD-10-CM

## 2020-10-13 LAB
ANISOCYTOSIS BLD QL: SLIGHT — SIGNIFICANT CHANGE UP
BASOPHILS # BLD AUTO: 1.5 K/UL — HIGH (ref 0–0.2)
BASOPHILS NFR BLD AUTO: 2 % — SIGNIFICANT CHANGE UP (ref 0–2)
BLASTS # FLD: 4 % — HIGH (ref 0–0)
DACRYOCYTES BLD QL SMEAR: SLIGHT — SIGNIFICANT CHANGE UP
ELLIPTOCYTES BLD QL SMEAR: SLIGHT — SIGNIFICANT CHANGE UP
EOSINOPHIL # BLD AUTO: 0 K/UL — SIGNIFICANT CHANGE UP (ref 0–0.5)
EOSINOPHIL NFR BLD AUTO: 0 % — SIGNIFICANT CHANGE UP (ref 0–6)
GIANT PLATELETS BLD QL SMEAR: PRESENT — SIGNIFICANT CHANGE UP
HCT VFR BLD CALC: 31.5 % — LOW (ref 39–50)
HGB BLD-MCNC: 9.7 G/DL — LOW (ref 13–17)
LG PLATELETS BLD QL AUTO: SLIGHT — SIGNIFICANT CHANGE UP
LYMPHOCYTES # BLD AUTO: 11 % — LOW (ref 13–44)
LYMPHOCYTES # BLD AUTO: 8.25 K/UL — HIGH (ref 1–3.3)
MCHC RBC-ENTMCNC: 27.2 PG — SIGNIFICANT CHANGE UP (ref 27–34)
MCHC RBC-ENTMCNC: 30.8 G/DL — LOW (ref 32–36)
MCV RBC AUTO: 88.5 FL — SIGNIFICANT CHANGE UP (ref 80–100)
METAMYELOCYTES # FLD: 4 % — HIGH (ref 0–0)
MICROCYTES BLD QL: SLIGHT — SIGNIFICANT CHANGE UP
MONOCYTES # BLD AUTO: 5.25 K/UL — HIGH (ref 0–0.9)
MONOCYTES NFR BLD AUTO: 7 % — SIGNIFICANT CHANGE UP (ref 2–14)
MYELOCYTES NFR BLD: 18 % — HIGH (ref 0–0)
NEUTROPHILS # BLD AUTO: 39.77 K/UL — HIGH (ref 1.8–7.4)
NEUTROPHILS NFR BLD AUTO: 45 % — SIGNIFICANT CHANGE UP (ref 43–77)
NEUTS BAND # BLD: 8 % — SIGNIFICANT CHANGE UP (ref 0–8)
NRBC # BLD: 6 /100 — HIGH (ref 0–0)
NRBC # BLD: SIGNIFICANT CHANGE UP /100 WBCS (ref 0–0)
PLAT MORPH BLD: NORMAL — SIGNIFICANT CHANGE UP
PLATELET # BLD AUTO: 403 K/UL — HIGH (ref 150–400)
POIKILOCYTOSIS BLD QL AUTO: SLIGHT — SIGNIFICANT CHANGE UP
PROMYELOCYTES # FLD: 1 % — HIGH (ref 0–0)
RBC # BLD: 3.56 M/UL — LOW (ref 4.2–5.8)
RBC # FLD: 18.3 % — HIGH (ref 10.3–14.5)
RBC BLD AUTO: ABNORMAL
SMUDGE CELLS # BLD: PRESENT — SIGNIFICANT CHANGE UP
WBC # BLD: 75.03 K/UL — CRITICAL HIGH (ref 3.8–10.5)
WBC # FLD AUTO: 75.03 K/UL — CRITICAL HIGH (ref 3.8–10.5)

## 2020-10-14 ENCOUNTER — APPOINTMENT (OUTPATIENT)
Dept: INFUSION THERAPY | Facility: HOSPITAL | Age: 70
End: 2020-10-14

## 2020-10-14 ENCOUNTER — APPOINTMENT (OUTPATIENT)
Dept: HEMATOLOGY ONCOLOGY | Facility: CLINIC | Age: 70
End: 2020-10-14
Payer: MEDICARE

## 2020-10-14 PROCEDURE — 99441: CPT | Mod: 95

## 2020-10-15 ENCOUNTER — APPOINTMENT (OUTPATIENT)
Dept: INFUSION THERAPY | Facility: HOSPITAL | Age: 70
End: 2020-10-15

## 2020-10-15 LAB — HEMATOPATHOLOGY REPORT: SIGNIFICANT CHANGE UP

## 2020-10-16 ENCOUNTER — APPOINTMENT (OUTPATIENT)
Dept: INFUSION THERAPY | Facility: HOSPITAL | Age: 70
End: 2020-10-16

## 2020-10-17 ENCOUNTER — RESULT REVIEW (OUTPATIENT)
Age: 70
End: 2020-10-17

## 2020-10-17 ENCOUNTER — APPOINTMENT (OUTPATIENT)
Dept: INFUSION THERAPY | Facility: HOSPITAL | Age: 70
End: 2020-10-17

## 2020-10-17 LAB
BASOPHILS # BLD AUTO: 0 K/UL — SIGNIFICANT CHANGE UP (ref 0–0.2)
BASOPHILS NFR BLD AUTO: 0 % — SIGNIFICANT CHANGE UP (ref 0–2)
BLASTS # FLD: 3 % — HIGH (ref 0–0)
EOSINOPHIL # BLD AUTO: 0.61 K/UL — HIGH (ref 0–0.5)
EOSINOPHIL NFR BLD AUTO: 1 % — SIGNIFICANT CHANGE UP (ref 0–6)
GIANT PLATELETS BLD QL SMEAR: PRESENT — SIGNIFICANT CHANGE UP
HCT VFR BLD CALC: 33.2 % — LOW (ref 39–50)
HGB BLD-MCNC: 9.9 G/DL — LOW (ref 13–17)
LG PLATELETS BLD QL AUTO: SLIGHT — SIGNIFICANT CHANGE UP
LYMPHOCYTES # BLD AUTO: 4.24 K/UL — HIGH (ref 1–3.3)
LYMPHOCYTES # BLD AUTO: 7 % — LOW (ref 13–44)
MCHC RBC-ENTMCNC: 27.1 PG — SIGNIFICANT CHANGE UP (ref 27–34)
MCHC RBC-ENTMCNC: 29.8 G/DL — LOW (ref 32–36)
MCV RBC AUTO: 91 FL — SIGNIFICANT CHANGE UP (ref 80–100)
METAMYELOCYTES # FLD: 7 % — HIGH (ref 0–0)
MONOCYTES # BLD AUTO: 3.63 K/UL — HIGH (ref 0–0.9)
MONOCYTES NFR BLD AUTO: 6 % — SIGNIFICANT CHANGE UP (ref 2–14)
MYELOCYTES NFR BLD: 6 % — HIGH (ref 0–0)
NEUTROPHILS # BLD AUTO: 39.36 K/UL — HIGH (ref 1.8–7.4)
NEUTROPHILS NFR BLD AUTO: 55 % — SIGNIFICANT CHANGE UP (ref 43–77)
NEUTS BAND # BLD: 10 % — HIGH (ref 0–8)
NRBC # BLD: 0 /100 — SIGNIFICANT CHANGE UP (ref 0–0)
NRBC # BLD: SIGNIFICANT CHANGE UP /100 WBCS (ref 0–0)
PLAT MORPH BLD: NORMAL — SIGNIFICANT CHANGE UP
PLATELET # BLD AUTO: 292 K/UL — SIGNIFICANT CHANGE UP (ref 150–400)
PROMYELOCYTES # FLD: 5 % — HIGH (ref 0–0)
RBC # BLD: 3.65 M/UL — LOW (ref 4.2–5.8)
RBC # FLD: 18.3 % — HIGH (ref 10.3–14.5)
RBC BLD AUTO: SIGNIFICANT CHANGE UP
WBC # BLD: 60.56 K/UL — CRITICAL HIGH (ref 3.8–10.5)
WBC # FLD AUTO: 60.56 K/UL — CRITICAL HIGH (ref 3.8–10.5)

## 2020-10-19 ENCOUNTER — NON-APPOINTMENT (OUTPATIENT)
Age: 70
End: 2020-10-19

## 2020-10-20 ENCOUNTER — NON-APPOINTMENT (OUTPATIENT)
Age: 70
End: 2020-10-20

## 2020-10-20 ENCOUNTER — RESULT REVIEW (OUTPATIENT)
Age: 70
End: 2020-10-20

## 2020-10-20 ENCOUNTER — APPOINTMENT (OUTPATIENT)
Dept: HEMATOLOGY ONCOLOGY | Facility: CLINIC | Age: 70
End: 2020-10-20
Payer: MEDICARE

## 2020-10-20 VITALS
TEMPERATURE: 97.2 F | BODY MASS INDEX: 25.34 KG/M2 | WEIGHT: 171.08 LBS | DIASTOLIC BLOOD PRESSURE: 78 MMHG | HEIGHT: 68.82 IN | SYSTOLIC BLOOD PRESSURE: 120 MMHG | RESPIRATION RATE: 17 BRPM | HEART RATE: 70 BPM | OXYGEN SATURATION: 98 %

## 2020-10-20 LAB
BASOPHILS # BLD AUTO: 0.5 K/UL — HIGH (ref 0–0.2)
BASOPHILS NFR BLD AUTO: 1 % — SIGNIFICANT CHANGE UP (ref 0–2)
BLASTS # FLD: 4 % — HIGH (ref 0–0)
EOSINOPHIL # BLD AUTO: 0 K/UL — SIGNIFICANT CHANGE UP (ref 0–0.5)
EOSINOPHIL NFR BLD AUTO: 0 % — SIGNIFICANT CHANGE UP (ref 0–6)
HCT VFR BLD CALC: 31.7 % — LOW (ref 39–50)
HGB BLD-MCNC: 9.6 G/DL — LOW (ref 13–17)
LYMPHOCYTES # BLD AUTO: 19 % — SIGNIFICANT CHANGE UP (ref 13–44)
LYMPHOCYTES # BLD AUTO: 9.51 K/UL — HIGH (ref 1–3.3)
MCHC RBC-ENTMCNC: 27.4 PG — SIGNIFICANT CHANGE UP (ref 27–34)
MCHC RBC-ENTMCNC: 30.3 G/DL — LOW (ref 32–36)
MCV RBC AUTO: 90.6 FL — SIGNIFICANT CHANGE UP (ref 80–100)
METAMYELOCYTES # FLD: 1 % — HIGH (ref 0–0)
MONOCYTES # BLD AUTO: 4.5 K/UL — HIGH (ref 0–0.9)
MONOCYTES NFR BLD AUTO: 9 % — SIGNIFICANT CHANGE UP (ref 2–14)
MYELOCYTES NFR BLD: 6 % — HIGH (ref 0–0)
NEUTROPHILS # BLD AUTO: 30.02 K/UL — HIGH (ref 1.8–7.4)
NEUTROPHILS NFR BLD AUTO: 58 % — SIGNIFICANT CHANGE UP (ref 43–77)
NEUTS BAND # BLD: 2 % — SIGNIFICANT CHANGE UP (ref 0–8)
NRBC # BLD: 0 /100 — SIGNIFICANT CHANGE UP (ref 0–0)
NRBC # BLD: SIGNIFICANT CHANGE UP /100 WBCS (ref 0–0)
PLAT MORPH BLD: NORMAL — SIGNIFICANT CHANGE UP
PLATELET # BLD AUTO: 222 K/UL — SIGNIFICANT CHANGE UP (ref 150–400)
RBC # BLD: 3.5 M/UL — LOW (ref 4.2–5.8)
RBC # FLD: 18.4 % — HIGH (ref 10.3–14.5)
RBC BLD AUTO: SIGNIFICANT CHANGE UP
WBC # BLD: 50.03 K/UL — CRITICAL HIGH (ref 3.8–10.5)
WBC # FLD AUTO: 50.03 K/UL — CRITICAL HIGH (ref 3.8–10.5)

## 2020-10-20 PROCEDURE — 88360 TUMOR IMMUNOHISTOCHEM/MANUAL: CPT

## 2020-10-20 PROCEDURE — 88185 FLOWCYTOMETRY/TC ADD-ON: CPT

## 2020-10-20 PROCEDURE — 88280 CHROMOSOME KARYOTYPE STUDY: CPT

## 2020-10-20 PROCEDURE — 88342 IMHCHEM/IMCYTCHM 1ST ANTB: CPT

## 2020-10-20 PROCEDURE — 86901 BLOOD TYPING SEROLOGIC RH(D): CPT

## 2020-10-20 PROCEDURE — 86900 BLOOD TYPING SEROLOGIC ABO: CPT

## 2020-10-20 PROCEDURE — 86850 RBC ANTIBODY SCREEN: CPT

## 2020-10-20 PROCEDURE — 88184 FLOWCYTOMETRY/ TC 1 MARKER: CPT

## 2020-10-20 PROCEDURE — 85097 BONE MARROW INTERPRETATION: CPT

## 2020-10-20 PROCEDURE — 87205 SMEAR GRAM STAIN: CPT

## 2020-10-20 PROCEDURE — 88264 CHROMOSOME ANALYSIS 20-25: CPT

## 2020-10-20 PROCEDURE — 88305 TISSUE EXAM BY PATHOLOGIST: CPT

## 2020-10-20 PROCEDURE — 88341 IMHCHEM/IMCYTCHM EA ADD ANTB: CPT

## 2020-10-20 PROCEDURE — 88237 TISSUE CULTURE BONE MARROW: CPT

## 2020-10-20 PROCEDURE — 99214 OFFICE O/P EST MOD 30 MIN: CPT

## 2020-10-20 PROCEDURE — 88313 SPECIAL STAINS GROUP 2: CPT

## 2020-10-20 NOTE — CONSULT LETTER
[Dear  ___] : Dear ~PEDRO, [Sincerely,] : Sincerely, [Courtesy Letter:] : I had the pleasure of seeing your patient, [unfilled], in my office today. [DrGuille  ___] : Dr. MONTES [FreeTextEntry2] : Patrick Gonzales MD [FreeTextEntry3] : Nilo\par Noam Coker M.D., FACP\par Professor of Medicine\par [employer]\par Associate Chief, Division of Hematology\par [facility]\par [facility]\par [address]\[place] [address]\par [phone]\par \par \par \par

## 2020-10-20 NOTE — HISTORY OF PRESENT ILLNESS
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  Denies fevers, chills, cough, abdominal pain, BRBPR, rash, bleeding, bruising, SOB.  Had some constipation after treatment-took extra doses of Dulcolax for 2 days with relief.  Reports night sweats which is unchanged.   Fatigue remains the same.  Weight is stable.  Reports early satiety but is trying to maintain adequate oral intake.

## 2020-10-20 NOTE — RESULTS/DATA
[FreeTextEntry1] : WBC 23756 Hgb 9.6 Hct 31.7 Platelets 222,000 4 blasts\par \par 9/23/20\par BM asp/biopsy: MPN with secondary myelofibrosis, 5% blasts, minute MBUS population. 46, XY, del 13 (q12q14)

## 2020-10-20 NOTE — PHYSICAL EXAM
[Restricted in physically strenuous activity but ambulatory and able to carry out work of a light or sedentary nature] : Status 1- Restricted in physically strenuous activity but ambulatory and able to carry out work of a light or sedentary nature, e.g., light house work, office work [Normal] : normal appearance, no rash, nodules, vesicles, ulcers, erythema [de-identified] : BS normal. Soft, nontender. Spleen palpable 8 cm below LCM-AAL. No hepatomegaly, masses. [de-identified] : 3 x 3 cm lipoma on posterior left upper extremity

## 2020-10-20 NOTE — ASSESSMENT
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 The latter is not clinically significant. \par \par Weight loss, worsening anemia, increased blasts in peripheral blood and marrow, increased WBC indicate progression to more aggressive disease that warrants treatment. DIPSS score-6, high risk category. Treatment options explored with pt including Jakafi, alloSCT and clinical trials. Consultation at [facility] with Dr. Patrick Gonzales completed. Started Dacogen on 10/12/20; dacogen + Jakafi will begin cycle 2 while awaiting SCT.  \par \par Plan:\par Dacogen 20 mg/m2 IV daily x 5 every 4 weeks-today is day 9. Start Jakafi 10 mg daily cycle 2.\par Allopurinol-pt is taking this for gout. \par Reglan 10 po q6h prn nausea\par CMP, LDH, urate, PO4\par Obtain recent hepatitis panel results\par \par

## 2020-10-20 NOTE — REVIEW OF SYSTEMS
[Fatigue] : fatigue [Abdominal Pain] : abdominal pain [Constipation] : constipation [Joint Pain] : joint pain [Negative] : Allergic/Immunologic [FreeTextEntry8] : constipation, took Dulcolax with relief

## 2020-10-23 ENCOUNTER — RESULT REVIEW (OUTPATIENT)
Age: 70
End: 2020-10-23

## 2020-10-23 ENCOUNTER — APPOINTMENT (OUTPATIENT)
Dept: HEMATOLOGY ONCOLOGY | Facility: CLINIC | Age: 70
End: 2020-10-23
Payer: MEDICARE

## 2020-10-23 VITALS
HEIGHT: 68.82 IN | WEIGHT: 169.73 LBS | SYSTOLIC BLOOD PRESSURE: 110 MMHG | HEART RATE: 85 BPM | OXYGEN SATURATION: 99 % | DIASTOLIC BLOOD PRESSURE: 75 MMHG | TEMPERATURE: 98.4 F | RESPIRATION RATE: 16 BRPM | BODY MASS INDEX: 25.14 KG/M2

## 2020-10-23 LAB
BASOPHILS # BLD AUTO: 1.58 K/UL — HIGH (ref 0–0.2)
BASOPHILS NFR BLD AUTO: 4 % — HIGH (ref 0–2)
BLASTS # FLD: 3 % — HIGH (ref 0–0)
EOSINOPHIL # BLD AUTO: 0 K/UL — SIGNIFICANT CHANGE UP (ref 0–0.5)
EOSINOPHIL NFR BLD AUTO: 0 % — SIGNIFICANT CHANGE UP (ref 0–6)
HCT VFR BLD CALC: 29.7 % — LOW (ref 39–50)
HGB BLD-MCNC: 9.3 G/DL — LOW (ref 13–17)
LYMPHOCYTES # BLD AUTO: 12 % — LOW (ref 13–44)
LYMPHOCYTES # BLD AUTO: 4.73 K/UL — HIGH (ref 1–3.3)
MCHC RBC-ENTMCNC: 27.9 PG — SIGNIFICANT CHANGE UP (ref 27–34)
MCHC RBC-ENTMCNC: 31.3 G/DL — LOW (ref 32–36)
MCV RBC AUTO: 89.2 FL — SIGNIFICANT CHANGE UP (ref 80–100)
METAMYELOCYTES # FLD: 4 % — HIGH (ref 0–0)
MONOCYTES # BLD AUTO: 3.95 K/UL — HIGH (ref 0–0.9)
MONOCYTES NFR BLD AUTO: 10 % — SIGNIFICANT CHANGE UP (ref 2–14)
MYELOCYTES NFR BLD: 5 % — HIGH (ref 0–0)
NEUTROPHILS # BLD AUTO: 22.49 K/UL — HIGH (ref 1.8–7.4)
NEUTROPHILS NFR BLD AUTO: 52 % — SIGNIFICANT CHANGE UP (ref 43–77)
NEUTS BAND # BLD: 5 % — SIGNIFICANT CHANGE UP (ref 0–8)
NRBC # BLD: 1 /100 — HIGH (ref 0–0)
NRBC # BLD: SIGNIFICANT CHANGE UP /100 WBCS (ref 0–0)
PLAT MORPH BLD: NORMAL — SIGNIFICANT CHANGE UP
PLATELET # BLD AUTO: 166 K/UL — SIGNIFICANT CHANGE UP (ref 150–400)
PROMYELOCYTES # FLD: 5 % — HIGH (ref 0–0)
RBC # BLD: 3.33 M/UL — LOW (ref 4.2–5.8)
RBC # FLD: 18.6 % — HIGH (ref 10.3–14.5)
RBC BLD AUTO: SIGNIFICANT CHANGE UP
WBC # BLD: 39.45 K/UL — HIGH (ref 3.8–10.5)
WBC # FLD AUTO: 39.45 K/UL — HIGH (ref 3.8–10.5)

## 2020-10-23 PROCEDURE — 99214 OFFICE O/P EST MOD 30 MIN: CPT

## 2020-10-24 ENCOUNTER — APPOINTMENT (OUTPATIENT)
Dept: INFUSION THERAPY | Facility: HOSPITAL | Age: 70
End: 2020-10-24

## 2020-10-26 ENCOUNTER — OUTPATIENT (OUTPATIENT)
Dept: OUTPATIENT SERVICES | Facility: HOSPITAL | Age: 70
LOS: 1 days | Discharge: HOME | End: 2020-10-26

## 2020-10-26 DIAGNOSIS — D47.3 ESSENTIAL (HEMORRHAGIC) THROMBOCYTHEMIA: ICD-10-CM

## 2020-10-27 ENCOUNTER — RESULT REVIEW (OUTPATIENT)
Age: 70
End: 2020-10-27

## 2020-10-27 ENCOUNTER — APPOINTMENT (OUTPATIENT)
Dept: HEMATOLOGY ONCOLOGY | Facility: CLINIC | Age: 70
End: 2020-10-27
Payer: MEDICARE

## 2020-10-27 VITALS
SYSTOLIC BLOOD PRESSURE: 111 MMHG | OXYGEN SATURATION: 98 % | BODY MASS INDEX: 25.53 KG/M2 | HEART RATE: 84 BPM | RESPIRATION RATE: 17 BRPM | TEMPERATURE: 97.3 F | WEIGHT: 172.4 LBS | HEIGHT: 68.82 IN | DIASTOLIC BLOOD PRESSURE: 75 MMHG

## 2020-10-27 LAB
BASOPHILS # BLD AUTO: 0 K/UL — SIGNIFICANT CHANGE UP (ref 0–0.2)
BASOPHILS NFR BLD AUTO: 0 % — SIGNIFICANT CHANGE UP (ref 0–2)
BLASTS # FLD: 6 % — HIGH (ref 0–0)
ELLIPTOCYTES BLD QL SMEAR: SLIGHT — SIGNIFICANT CHANGE UP
EOSINOPHIL # BLD AUTO: 0 K/UL — SIGNIFICANT CHANGE UP (ref 0–0.5)
EOSINOPHIL NFR BLD AUTO: 0 % — SIGNIFICANT CHANGE UP (ref 0–6)
HCT VFR BLD CALC: 30.9 % — LOW (ref 39–50)
HGB BLD-MCNC: 9.4 G/DL — LOW (ref 13–17)
HYPOCHROMIA BLD QL: SLIGHT — SIGNIFICANT CHANGE UP
LYMPHOCYTES # BLD AUTO: 14 % — SIGNIFICANT CHANGE UP (ref 13–44)
LYMPHOCYTES # BLD AUTO: 4.23 K/UL — HIGH (ref 1–3.3)
MCHC RBC-ENTMCNC: 27.6 PG — SIGNIFICANT CHANGE UP (ref 27–34)
MCHC RBC-ENTMCNC: 30.4 G/DL — LOW (ref 32–36)
MCV RBC AUTO: 90.9 FL — SIGNIFICANT CHANGE UP (ref 80–100)
METAMYELOCYTES # FLD: 1 % — HIGH (ref 0–0)
MONOCYTES # BLD AUTO: 2.72 K/UL — HIGH (ref 0–0.9)
MONOCYTES NFR BLD AUTO: 9 % — SIGNIFICANT CHANGE UP (ref 2–14)
MYELOCYTES NFR BLD: 3 % — HIGH (ref 0–0)
NEUTROPHILS # BLD AUTO: 19.03 K/UL — HIGH (ref 1.8–7.4)
NEUTROPHILS NFR BLD AUTO: 63 % — SIGNIFICANT CHANGE UP (ref 43–77)
NRBC # BLD: 18 /100 — HIGH (ref 0–0)
NRBC # BLD: SIGNIFICANT CHANGE UP /100 WBCS (ref 0–0)
OVALOCYTES BLD QL SMEAR: SLIGHT — SIGNIFICANT CHANGE UP
PLAT MORPH BLD: NORMAL — SIGNIFICANT CHANGE UP
PLATELET # BLD AUTO: 235 K/UL — SIGNIFICANT CHANGE UP (ref 150–400)
POIKILOCYTOSIS BLD QL AUTO: SLIGHT — SIGNIFICANT CHANGE UP
PROMYELOCYTES # FLD: 4 % — HIGH (ref 0–0)
RBC # BLD: 3.4 M/UL — LOW (ref 4.2–5.8)
RBC # FLD: 20.4 % — HIGH (ref 10.3–14.5)
RBC BLD AUTO: ABNORMAL
SMUDGE CELLS # BLD: PRESENT — SIGNIFICANT CHANGE UP
WBC # BLD: 30.2 K/UL — HIGH (ref 3.8–10.5)
WBC # FLD AUTO: 30.2 K/UL — HIGH (ref 3.8–10.5)

## 2020-10-27 PROCEDURE — 99214 OFFICE O/P EST MOD 30 MIN: CPT

## 2020-10-28 NOTE — RESULTS/DATA
[FreeTextEntry1] : WBC 21199 Hgb 9.3 Hct 29.7 Platelets 166,000 3 % blasts\par \par 9/23/20\par BM asp/biopsy: MPN with secondary myelofibrosis, 5% blasts, minute MBUS population. 46, XY, del 13 (q12q14)

## 2020-10-28 NOTE — REVIEW OF SYSTEMS
[Fatigue] : fatigue [Abdominal Pain] : abdominal pain [Constipation] : constipation [Joint Pain] : joint pain [Negative] : Allergic/Immunologic [FreeTextEntry2] : fatigue is better [FreeTextEntry8] : constipation, took Dulcolax with relief

## 2020-10-28 NOTE — PHYSICAL EXAM
[Restricted in physically strenuous activity but ambulatory and able to carry out work of a light or sedentary nature] : Status 1- Restricted in physically strenuous activity but ambulatory and able to carry out work of a light or sedentary nature, e.g., light house work, office work [Normal] : affect appropriate [de-identified] : BS normal. Soft, nontender. Spleen palpable 8 cm below LCM-AAL. No hepatomegaly, masses. [de-identified] : 3 x 3 cm lipoma on posterior left upper extremity

## 2020-10-28 NOTE — ASSESSMENT
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 The latter is not clinically significant. \par \par Weight loss, worsening anemia, increased blasts in peripheral blood and marrow, increased WBC indicate progression to more aggressive disease that warrants treatment. DIPSS score-6, high risk category. Treatment options explored with pt including Jakafi, alloSCT and clinical trials. Consultation at [facility] with Dr. Patrick Gonzales completed. Started Dacogen on 10/13/20; dacogen + Jakafi will begin cycle 2 while awaiting SCT.  Feeling better-more energy.  \par \par Plan:\par Dacogen 20 mg/m2 IV daily x 5 every 4 weeks-today is day 9. Start Jakafi 10 mg daily cycle 2.\par Allopurinol-pt is taking this for gout. \par Reglan 10 po q6h prn nausea\par CMP, LDH, urate, PO4\par Obtain recent hepatitis panel results\par \par

## 2020-10-28 NOTE — CONSULT LETTER
[Dear  ___] : Dear ~PEDRO, [Courtesy Letter:] : I had the pleasure of seeing your patient, [unfilled], in my office today. [Sincerely,] : Sincerely, [DrGuille  ___] : Dr. MONTES [FreeTextEntry2] : Patrick Gonzales MD [FreeTextEntry3] : Nilo\par Noam Coker M.D., FACP\par Professor of Medicine\par [employer]\par Associate Chief, Division of Hematology\par [facility]\par [facility]\par [address]\[place] [address]\par [phone]\par \par \par \par

## 2020-10-28 NOTE — HISTORY OF PRESENT ILLNESS
Local Anesthesia Pre Procedure Assessment    Informed Consent:    Consent Obtained: Yes     Procedure Assessment:    Preop Diagnosis/Indications for Procedure: Pain  Planned Procedure: HIP  Planned Anesthetic: Local    Medical History/Comorbid Conditions:    Significant Medical/Surgical History: Yes (comment) (See H&P Note)  Normal Mental Status: Yes    Examination Pertinent to Procedure Being Performed:    Evaluation of Operative Site: Clean, no deformity, redness/warmth/swelling, no masses    Other Findings:    Reviewed Current Medications and Allergies: Yes    Pertinent Lab/Diagnostic Tests:    Pertinent Lab / Diagnostic Tests: Other (comment) (See Imaging section)    Shaggy Nova MD  09/17/19     [Disease:__________________________] : Disease: [unfilled] [de-identified] : 4/2019 ; 9/2020 Secondary myelofibrosis with monocytosis , 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  Denies fevers, chills, cough, abdominal pain, BRBPR, rash, bleeding, bruising, SOB.  Constipation is improved. Reports night sweats which is unchanged.   Feels better-has more energy.  Weight is stable.  Appetite has improved.

## 2020-10-30 ENCOUNTER — RESULT REVIEW (OUTPATIENT)
Age: 70
End: 2020-10-30

## 2020-10-30 ENCOUNTER — APPOINTMENT (OUTPATIENT)
Dept: INFUSION THERAPY | Facility: HOSPITAL | Age: 70
End: 2020-10-30

## 2020-10-30 LAB
BASOPHILS # BLD AUTO: 1.32 K/UL — HIGH (ref 0–0.2)
BASOPHILS NFR BLD AUTO: 6 % — HIGH (ref 0–2)
BLASTS # FLD: 4 % — HIGH (ref 0–0)
EOSINOPHIL # BLD AUTO: 0.66 K/UL — HIGH (ref 0–0.5)
EOSINOPHIL NFR BLD AUTO: 3 % — SIGNIFICANT CHANGE UP (ref 0–6)
HCT VFR BLD CALC: 31.9 % — LOW (ref 39–50)
HGB BLD-MCNC: 9.6 G/DL — LOW (ref 13–17)
LYMPHOCYTES # BLD AUTO: 17 % — SIGNIFICANT CHANGE UP (ref 13–44)
LYMPHOCYTES # BLD AUTO: 3.75 K/UL — HIGH (ref 1–3.3)
MCHC RBC-ENTMCNC: 27.7 PG — SIGNIFICANT CHANGE UP (ref 27–34)
MCHC RBC-ENTMCNC: 30.1 G/DL — LOW (ref 32–36)
MCV RBC AUTO: 91.9 FL — SIGNIFICANT CHANGE UP (ref 80–100)
METAMYELOCYTES # FLD: 5 % — HIGH (ref 0–0)
MONOCYTES # BLD AUTO: 1.77 K/UL — HIGH (ref 0–0.9)
MONOCYTES NFR BLD AUTO: 8 % — SIGNIFICANT CHANGE UP (ref 2–14)
MYELOCYTES NFR BLD: 8 % — HIGH (ref 0–0)
NEUTROPHILS # BLD AUTO: 10.38 K/UL — HIGH (ref 1.8–7.4)
NEUTROPHILS NFR BLD AUTO: 43 % — SIGNIFICANT CHANGE UP (ref 43–77)
NEUTS BAND # BLD: 4 % — SIGNIFICANT CHANGE UP (ref 0–8)
NRBC # BLD: 9 /100 — HIGH (ref 0–0)
NRBC # BLD: SIGNIFICANT CHANGE UP /100 WBCS (ref 0–0)
PLAT MORPH BLD: NORMAL — SIGNIFICANT CHANGE UP
PLATELET # BLD AUTO: 360 K/UL — SIGNIFICANT CHANGE UP (ref 150–400)
PROMYELOCYTES # FLD: 2 % — HIGH (ref 0–0)
RBC # BLD: 3.47 M/UL — LOW (ref 4.2–5.8)
RBC # FLD: 21 % — HIGH (ref 10.3–14.5)
RBC BLD AUTO: SIGNIFICANT CHANGE UP
WBC # BLD: 22.08 K/UL — HIGH (ref 3.8–10.5)
WBC # FLD AUTO: 22.08 K/UL — HIGH (ref 3.8–10.5)

## 2020-11-03 ENCOUNTER — RESULT REVIEW (OUTPATIENT)
Age: 70
End: 2020-11-03

## 2020-11-03 ENCOUNTER — APPOINTMENT (OUTPATIENT)
Dept: DISASTER EMERGENCY | Facility: CLINIC | Age: 70
End: 2020-11-03

## 2020-11-03 ENCOUNTER — APPOINTMENT (OUTPATIENT)
Dept: HEMATOLOGY ONCOLOGY | Facility: CLINIC | Age: 70
End: 2020-11-03

## 2020-11-03 ENCOUNTER — APPOINTMENT (OUTPATIENT)
Dept: INFUSION THERAPY | Facility: HOSPITAL | Age: 70
End: 2020-11-03

## 2020-11-03 LAB
BASOPHILS # BLD AUTO: 0.86 K/UL — HIGH (ref 0–0.2)
BASOPHILS NFR BLD AUTO: 4 % — HIGH (ref 0–2)
BLASTS # FLD: 6 % — HIGH (ref 0–0)
EOSINOPHIL # BLD AUTO: 0.21 K/UL — SIGNIFICANT CHANGE UP (ref 0–0.5)
EOSINOPHIL NFR BLD AUTO: 1 % — SIGNIFICANT CHANGE UP (ref 0–6)
HCT VFR BLD CALC: 33 % — LOW (ref 39–50)
HGB BLD-MCNC: 9.8 G/DL — LOW (ref 13–17)
LYMPHOCYTES # BLD AUTO: 21 % — SIGNIFICANT CHANGE UP (ref 13–44)
LYMPHOCYTES # BLD AUTO: 4.5 K/UL — HIGH (ref 1–3.3)
MCHC RBC-ENTMCNC: 27.4 PG — SIGNIFICANT CHANGE UP (ref 27–34)
MCHC RBC-ENTMCNC: 30 G/DL — LOW (ref 32–36)
MCV RBC AUTO: 91.4 FL — SIGNIFICANT CHANGE UP (ref 80–100)
METAMYELOCYTES # FLD: 5 % — HIGH (ref 0–0)
MONOCYTES # BLD AUTO: 1.5 K/UL — HIGH (ref 0–0.9)
MONOCYTES NFR BLD AUTO: 7 % — SIGNIFICANT CHANGE UP (ref 2–14)
MYELOCYTES NFR BLD: 5 % — HIGH (ref 0–0)
NEUTROPHILS # BLD AUTO: 10.92 K/UL — HIGH (ref 1.8–7.4)
NEUTROPHILS NFR BLD AUTO: 51 % — SIGNIFICANT CHANGE UP (ref 43–77)
NRBC # BLD: 1 /100 — HIGH (ref 0–0)
NRBC # BLD: SIGNIFICANT CHANGE UP /100 WBCS (ref 0–0)
PLAT MORPH BLD: NORMAL — SIGNIFICANT CHANGE UP
PLATELET # BLD AUTO: 430 K/UL — HIGH (ref 150–400)
RBC # BLD: 3.63 M/UL — LOW (ref 4.2–5.8)
RBC # FLD: 21.2 % — HIGH (ref 10.3–14.5)
RBC BLD AUTO: SIGNIFICANT CHANGE UP
WBC # BLD: 21.41 K/UL — HIGH (ref 3.8–10.5)
WBC # FLD AUTO: 21.41 K/UL — HIGH (ref 3.8–10.5)

## 2020-11-06 ENCOUNTER — RESULT REVIEW (OUTPATIENT)
Age: 70
End: 2020-11-06

## 2020-11-06 ENCOUNTER — APPOINTMENT (OUTPATIENT)
Dept: PULMONOLOGY | Facility: CLINIC | Age: 70
End: 2020-11-06
Payer: MEDICARE

## 2020-11-06 ENCOUNTER — APPOINTMENT (OUTPATIENT)
Dept: HEMATOLOGY ONCOLOGY | Facility: CLINIC | Age: 70
End: 2020-11-06
Payer: MEDICARE

## 2020-11-06 VITALS
BODY MASS INDEX: 26.12 KG/M2 | HEIGHT: 68.82 IN | SYSTOLIC BLOOD PRESSURE: 115 MMHG | DIASTOLIC BLOOD PRESSURE: 76 MMHG | RESPIRATION RATE: 17 BRPM | OXYGEN SATURATION: 99 % | HEART RATE: 90 BPM | TEMPERATURE: 97.9 F | WEIGHT: 176.37 LBS

## 2020-11-06 LAB
BASOPHILS # BLD AUTO: 0.42 K/UL — HIGH (ref 0–0.2)
BASOPHILS NFR BLD AUTO: 2 % — SIGNIFICANT CHANGE UP (ref 0–2)
BLASTS # FLD: 2 % — HIGH (ref 0–0)
EOSINOPHIL # BLD AUTO: 0.21 K/UL — SIGNIFICANT CHANGE UP (ref 0–0.5)
EOSINOPHIL NFR BLD AUTO: 1 % — SIGNIFICANT CHANGE UP (ref 0–6)
HCT VFR BLD CALC: 33.7 % — LOW (ref 39–50)
HGB BLD-MCNC: 10.2 G/DL — LOW (ref 13–17)
LYMPHOCYTES # BLD AUTO: 20 % — SIGNIFICANT CHANGE UP (ref 13–44)
LYMPHOCYTES # BLD AUTO: 4.15 K/UL — HIGH (ref 1–3.3)
MCHC RBC-ENTMCNC: 27.5 PG — SIGNIFICANT CHANGE UP (ref 27–34)
MCHC RBC-ENTMCNC: 30.3 G/DL — LOW (ref 32–36)
MCV RBC AUTO: 90.8 FL — SIGNIFICANT CHANGE UP (ref 80–100)
METAMYELOCYTES # FLD: 5 % — HIGH (ref 0–0)
MONOCYTES # BLD AUTO: 1.25 K/UL — HIGH (ref 0–0.9)
MONOCYTES NFR BLD AUTO: 6 % — SIGNIFICANT CHANGE UP (ref 2–14)
MYELOCYTES NFR BLD: 8 % — HIGH (ref 0–0)
NEUTROPHILS # BLD AUTO: 11.21 K/UL — HIGH (ref 1.8–7.4)
NEUTROPHILS NFR BLD AUTO: 51 % — SIGNIFICANT CHANGE UP (ref 43–77)
NEUTS BAND # BLD: 3 % — SIGNIFICANT CHANGE UP (ref 0–8)
NRBC # BLD: 1 /100 — HIGH (ref 0–0)
NRBC # BLD: SIGNIFICANT CHANGE UP /100 WBCS (ref 0–0)
PLAT MORPH BLD: NORMAL — SIGNIFICANT CHANGE UP
PLATELET # BLD AUTO: 456 K/UL — HIGH (ref 150–400)
PROMYELOCYTES # FLD: 2 % — HIGH (ref 0–0)
RBC # BLD: 3.71 M/UL — LOW (ref 4.2–5.8)
RBC # FLD: 21.1 % — HIGH (ref 10.3–14.5)
RBC BLD AUTO: SIGNIFICANT CHANGE UP
WBC # BLD: 20.76 K/UL — HIGH (ref 3.8–10.5)
WBC # FLD AUTO: 20.76 K/UL — HIGH (ref 3.8–10.5)

## 2020-11-06 PROCEDURE — 94726 PLETHYSMOGRAPHY LUNG VOLUMES: CPT

## 2020-11-06 PROCEDURE — 94010 BREATHING CAPACITY TEST: CPT

## 2020-11-06 PROCEDURE — 94729 DIFFUSING CAPACITY: CPT

## 2020-11-06 PROCEDURE — 99214 OFFICE O/P EST MOD 30 MIN: CPT

## 2020-11-06 NOTE — PHYSICAL EXAM
[Restricted in physically strenuous activity but ambulatory and able to carry out work of a light or sedentary nature] : Status 1- Restricted in physically strenuous activity but ambulatory and able to carry out work of a light or sedentary nature, e.g., light house work, office work [Normal] : affect appropriate [de-identified] : BS normal. Soft, nontender. Spleen palpable 6 cm below LCM. No hepatomegaly, masses. [de-identified] : 3 x 3 cm lipoma on posterior left upper extremity

## 2020-11-06 NOTE — HISTORY OF PRESENT ILLNESS
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 Spleen has shrunk. Eating well. No early satiety. Back pain has resolved. Has been constipated. Scattered bony aches resolved, but now returning.  His pruritus has been tolerable, limited and stable. No other complaints. He notes no night sweats, shortness of breath, chest pain, headaches, visual problems, bleeding, bruising, leg pain/swelling, foot pain, BRBPR, melena. Has gained 6 lbs.

## 2020-11-06 NOTE — CONSULT LETTER
[Dear  ___] : Dear ~PEDRO, [Courtesy Letter:] : I had the pleasure of seeing your patient, [unfilled], in my office today. [Sincerely,] : Sincerely, [DrGuille  ___] : Dr. MONTES [FreeTextEntry2] : Patrick Gonzales MD [FreeTextEntry3] : Nilo\par Noam Coker M.D., FACP\par Professor of Medicine\par [facility] [employer] [employer]\par Associate Chief, Division of Hematology\par [facility]\par [facility]\par [address]\[place] [address]\par [phone]\par \par \par \par

## 2020-11-06 NOTE — RESULTS/DATA
[FreeTextEntry1] : WBC 20,760 Hgb 10.2 Hct 33.7 Platelets 456,000 2 blasts, 2 pro, 8 myelo, 5 meta, 3B, 51P, 20L, 6M, 1Eo, 2 Ba, 1 NRBC/100 WBC

## 2020-11-06 NOTE — HISTORY OF PRESENT ILLNESS
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  Denies fevers, chills, cough, abdominal pain, BRBPR, rash, bleeding, bruising, SOB, constipation. Some night sweats which are unchanged.  Pt states he feels "great".  Has gained some weight.   Reports excellent appetite.

## 2020-11-06 NOTE — ASSESSMENT
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 The latter is not clinically significant. \par \par Weight loss, worsening anemia, increased blasts in peripheral blood and marrow, increased WBC indicate progression to more aggressive disease that warrants treatment. DIPSS score-6, high risk category. Treatment options explored with pt including Jakafi, alloSCT and clinical trials. Consultation at [facility] with Dr. Patrick Gonzales completed. Began therapy with Dacogen cycle 1 and then dacogen + Jakafi beginning cycle 2 while awaiting SCT. Has responded well to first cycle with symptomatic improvement and resolution of splenomegaly.\par \par Plan:\par Dacogen 20 mg/m2 IV daily x 5 every 4 weeks. Start Jakafi 10 mg daily cycle 2.\par Allopurinol\par Reglan 10 mg po q6h prn nausea\par CMP, LDH, lipid profile\par RTC 2 weeks\par  \par  [Palliative Care Plan] : not applicable at this time

## 2020-11-06 NOTE — ASSESSMENT
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 The latter is not clinically significant. \par \par Weight loss, worsening anemia, increased blasts in peripheral blood and marrow, increased WBC indicate progression to more aggressive disease that warrants treatment. DIPSS score-6, high risk category. Treatment options explored with pt including Jakafi, alloSCT and clinical trials. Saw Dr. Patrick Gonzales at [facility] for transplant. Started Dacogen on 10/13/20; dacogen + Jakafi will begin cycle 2 while awaiting SCT.  Pt feels much more energy, good appetite. Spleen size smaller.  \par \par Plan:\par Dacogen 20 mg/m2 IV daily x 5 every 4 weeks-next cycle 11/10. Start Jakafi 10 mg daily cycle 2.\par Allopurinol-pt is taking this for gout. \par Reglan 10 po q6h prn nausea\par CMP, LDH, urate, PO4\par Follow up in one week with Dr Coker.\par BW on 11/3. \par PFT's and liver US to be done.  \par \par

## 2020-11-06 NOTE — RESULTS/DATA
[FreeTextEntry1] : WBC 26344 Hgb 9.4 Plts 235,000  ANC 70458 Blasts 6 % \par \par 9/23/20\par BM asp/biopsy: MPN with secondary myelofibrosis, 5% blasts, minute MBUS population. 46, XY, del 13 (q12q14)

## 2020-11-06 NOTE — PHYSICAL EXAM
[Fully active, able to carry on all pre-disease performance without restriction] : Status 0 - Fully active, able to carry on all pre-disease performance without restriction [Normal] : affect appropriate [de-identified] : No dullness in Traube's space. [de-identified] : 3 x 3 cm lipoma on posterior left upper extremity

## 2020-11-06 NOTE — CONSULT LETTER
[Dear  ___] : Dear ~PEDRO, [Courtesy Letter:] : I had the pleasure of seeing your patient, [unfilled], in my office today. [Sincerely,] : Sincerely, [FreeTextEntry2] : Patrick Gonzales MD [FreeTextEntry3] : Nilo\par Noam Coker M.D., FACP\par Professor of Medicine\par [employer]\par Associate Chief, Division of Hematology\par [facility]\par [facility]\par [address]\[place] [address]\par [phone]\par \par \par \par   [DrGuille  ___] : Dr. MONTES

## 2020-11-06 NOTE — REVIEW OF SYSTEMS
[Abdominal Pain] : abdominal pain [Joint Pain] : joint pain [Negative] : Genitourinary [Fatigue] : no fatigue [Constipation] : no constipation [FreeTextEntry2] : fatigue is much improved.

## 2020-11-10 ENCOUNTER — APPOINTMENT (OUTPATIENT)
Dept: INFUSION THERAPY | Facility: HOSPITAL | Age: 70
End: 2020-11-10

## 2020-11-10 ENCOUNTER — RESULT REVIEW (OUTPATIENT)
Age: 70
End: 2020-11-10

## 2020-11-10 DIAGNOSIS — Z51.11 ENCOUNTER FOR ANTINEOPLASTIC CHEMOTHERAPY: ICD-10-CM

## 2020-11-10 LAB
BASOPHILS # BLD AUTO: 0.88 K/UL — HIGH (ref 0–0.2)
BASOPHILS NFR BLD AUTO: 5 % — HIGH (ref 0–2)
BLASTS # FLD: 4 % — HIGH (ref 0–0)
EOSINOPHIL # BLD AUTO: 0.18 K/UL — SIGNIFICANT CHANGE UP (ref 0–0.5)
EOSINOPHIL NFR BLD AUTO: 1 % — SIGNIFICANT CHANGE UP (ref 0–6)
HCT VFR BLD CALC: 34.8 % — LOW (ref 39–50)
HGB BLD-MCNC: 10.2 G/DL — LOW (ref 13–17)
LYMPHOCYTES # BLD AUTO: 18 % — SIGNIFICANT CHANGE UP (ref 13–44)
LYMPHOCYTES # BLD AUTO: 3.18 K/UL — SIGNIFICANT CHANGE UP (ref 1–3.3)
MCHC RBC-ENTMCNC: 27.3 PG — SIGNIFICANT CHANGE UP (ref 27–34)
MCHC RBC-ENTMCNC: 29.3 G/DL — LOW (ref 32–36)
MCV RBC AUTO: 93.3 FL — SIGNIFICANT CHANGE UP (ref 80–100)
METAMYELOCYTES # FLD: 3 % — HIGH (ref 0–0)
MONOCYTES # BLD AUTO: 2.12 K/UL — HIGH (ref 0–0.9)
MONOCYTES NFR BLD AUTO: 12 % — SIGNIFICANT CHANGE UP (ref 2–14)
MYELOCYTES NFR BLD: 7 % — HIGH (ref 0–0)
NEUTROPHILS # BLD AUTO: 8.64 K/UL — HIGH (ref 1.8–7.4)
NEUTROPHILS NFR BLD AUTO: 46 % — SIGNIFICANT CHANGE UP (ref 43–77)
NEUTS BAND # BLD: 3 % — SIGNIFICANT CHANGE UP (ref 0–8)
NRBC # BLD: 3 /100 — HIGH (ref 0–0)
NRBC # BLD: SIGNIFICANT CHANGE UP /100 WBCS (ref 0–0)
PLAT MORPH BLD: NORMAL — SIGNIFICANT CHANGE UP
PLATELET # BLD AUTO: 498 K/UL — HIGH (ref 150–400)
PROMYELOCYTES # FLD: 1 % — HIGH (ref 0–0)
RBC # BLD: 3.73 M/UL — LOW (ref 4.2–5.8)
RBC # FLD: 20.2 % — HIGH (ref 10.3–14.5)
RBC BLD AUTO: SIGNIFICANT CHANGE UP
WBC # BLD: 17.64 K/UL — HIGH (ref 3.8–10.5)
WBC # FLD AUTO: 17.64 K/UL — HIGH (ref 3.8–10.5)

## 2020-11-11 ENCOUNTER — APPOINTMENT (OUTPATIENT)
Dept: INFUSION THERAPY | Facility: HOSPITAL | Age: 70
End: 2020-11-11

## 2020-11-12 ENCOUNTER — APPOINTMENT (OUTPATIENT)
Dept: INFUSION THERAPY | Facility: HOSPITAL | Age: 70
End: 2020-11-12

## 2020-11-13 ENCOUNTER — APPOINTMENT (OUTPATIENT)
Dept: INFUSION THERAPY | Facility: HOSPITAL | Age: 70
End: 2020-11-13

## 2020-11-14 ENCOUNTER — RESULT REVIEW (OUTPATIENT)
Age: 70
End: 2020-11-14

## 2020-11-14 ENCOUNTER — APPOINTMENT (OUTPATIENT)
Dept: INFUSION THERAPY | Facility: HOSPITAL | Age: 70
End: 2020-11-14

## 2020-11-14 LAB
BASOPHILS # BLD AUTO: 0.65 K/UL — HIGH (ref 0–0.2)
BASOPHILS NFR BLD AUTO: 4 % — HIGH (ref 0–2)
BLASTS # FLD: 1 % — HIGH (ref 0–0)
EOSINOPHIL # BLD AUTO: 0.82 K/UL — HIGH (ref 0–0.5)
EOSINOPHIL NFR BLD AUTO: 5 % — SIGNIFICANT CHANGE UP (ref 0–6)
HCT VFR BLD CALC: 33.6 % — LOW (ref 39–50)
HGB BLD-MCNC: 9.9 G/DL — LOW (ref 13–17)
LYMPHOCYTES # BLD AUTO: 18 % — SIGNIFICANT CHANGE UP (ref 13–44)
LYMPHOCYTES # BLD AUTO: 2.94 K/UL — SIGNIFICANT CHANGE UP (ref 1–3.3)
MCHC RBC-ENTMCNC: 27.2 PG — SIGNIFICANT CHANGE UP (ref 27–34)
MCHC RBC-ENTMCNC: 29.5 G/DL — LOW (ref 32–36)
MCV RBC AUTO: 92.3 FL — SIGNIFICANT CHANGE UP (ref 80–100)
METAMYELOCYTES # FLD: 7 % — HIGH (ref 0–0)
MONOCYTES # BLD AUTO: 2.12 K/UL — HIGH (ref 0–0.9)
MONOCYTES NFR BLD AUTO: 13 % — SIGNIFICANT CHANGE UP (ref 2–14)
MYELOCYTES NFR BLD: 8 % — HIGH (ref 0–0)
NEUTROPHILS # BLD AUTO: 6.85 K/UL — SIGNIFICANT CHANGE UP (ref 1.8–7.4)
NEUTROPHILS NFR BLD AUTO: 29 % — LOW (ref 43–77)
NEUTS BAND # BLD: 13 % — HIGH (ref 0–8)
NRBC # BLD: 0 /100 — SIGNIFICANT CHANGE UP (ref 0–0)
NRBC # BLD: SIGNIFICANT CHANGE UP /100 WBCS (ref 0–0)
PLAT MORPH BLD: NORMAL — SIGNIFICANT CHANGE UP
PLATELET # BLD AUTO: 648 K/UL — HIGH (ref 150–400)
PROMYELOCYTES # FLD: 2 % — HIGH (ref 0–0)
RBC # BLD: 3.64 M/UL — LOW (ref 4.2–5.8)
RBC # FLD: 19.7 % — HIGH (ref 10.3–14.5)
RBC BLD AUTO: SIGNIFICANT CHANGE UP
WBC # BLD: 16.32 K/UL — HIGH (ref 3.8–10.5)
WBC # FLD AUTO: 16.32 K/UL — HIGH (ref 3.8–10.5)

## 2020-11-17 ENCOUNTER — RESULT REVIEW (OUTPATIENT)
Age: 70
End: 2020-11-17

## 2020-11-17 ENCOUNTER — APPOINTMENT (OUTPATIENT)
Dept: HEMATOLOGY ONCOLOGY | Facility: CLINIC | Age: 70
End: 2020-11-17
Payer: MEDICARE

## 2020-11-17 VITALS
HEART RATE: 76 BPM | HEIGHT: 69.41 IN | RESPIRATION RATE: 18 BRPM | WEIGHT: 174.58 LBS | TEMPERATURE: 97.5 F | BODY MASS INDEX: 25.56 KG/M2 | DIASTOLIC BLOOD PRESSURE: 83 MMHG | SYSTOLIC BLOOD PRESSURE: 133 MMHG | OXYGEN SATURATION: 98 %

## 2020-11-17 LAB
ANISOCYTOSIS BLD QL: SLIGHT — SIGNIFICANT CHANGE UP
BASOPHILS # BLD AUTO: 0.99 K/UL — HIGH (ref 0–0.2)
BASOPHILS NFR BLD AUTO: 6 % — HIGH (ref 0–2)
BLASTS # FLD: 4 % — HIGH (ref 0–0)
DACRYOCYTES BLD QL SMEAR: SLIGHT — SIGNIFICANT CHANGE UP
ELLIPTOCYTES BLD QL SMEAR: SLIGHT — SIGNIFICANT CHANGE UP
EOSINOPHIL # BLD AUTO: 0.33 K/UL — SIGNIFICANT CHANGE UP (ref 0–0.5)
EOSINOPHIL NFR BLD AUTO: 2 % — SIGNIFICANT CHANGE UP (ref 0–6)
HCT VFR BLD CALC: 34.7 % — LOW (ref 39–50)
HGB BLD-MCNC: 10.2 G/DL — LOW (ref 13–17)
HYPOCHROMIA BLD QL: SLIGHT — SIGNIFICANT CHANGE UP
LYMPHOCYTES # BLD AUTO: 20 % — SIGNIFICANT CHANGE UP (ref 13–44)
LYMPHOCYTES # BLD AUTO: 3.31 K/UL — HIGH (ref 1–3.3)
MACROCYTES BLD QL: SLIGHT — SIGNIFICANT CHANGE UP
MCHC RBC-ENTMCNC: 26.9 PG — LOW (ref 27–34)
MCHC RBC-ENTMCNC: 29.4 G/DL — LOW (ref 32–36)
MCV RBC AUTO: 91.6 FL — SIGNIFICANT CHANGE UP (ref 80–100)
MICROCYTES BLD QL: SLIGHT — SIGNIFICANT CHANGE UP
MONOCYTES # BLD AUTO: 1.99 K/UL — HIGH (ref 0–0.9)
MONOCYTES NFR BLD AUTO: 12 % — SIGNIFICANT CHANGE UP (ref 2–14)
MYELOCYTES NFR BLD: 2 % — HIGH (ref 0–0)
NEUTROPHILS # BLD AUTO: 8.94 K/UL — HIGH (ref 1.8–7.4)
NEUTROPHILS NFR BLD AUTO: 51 % — SIGNIFICANT CHANGE UP (ref 43–77)
NEUTS BAND # BLD: 3 % — SIGNIFICANT CHANGE UP (ref 0–8)
NRBC # BLD: 3 /100 — HIGH (ref 0–0)
NRBC # BLD: SIGNIFICANT CHANGE UP /100 WBCS (ref 0–0)
OVALOCYTES BLD QL SMEAR: SLIGHT — SIGNIFICANT CHANGE UP
PLAT MORPH BLD: NORMAL — SIGNIFICANT CHANGE UP
PLATELET # BLD AUTO: 618 K/UL — HIGH (ref 150–400)
POIKILOCYTOSIS BLD QL AUTO: SLIGHT — SIGNIFICANT CHANGE UP
POLYCHROMASIA BLD QL SMEAR: SLIGHT — SIGNIFICANT CHANGE UP
RBC # BLD: 3.79 M/UL — LOW (ref 4.2–5.8)
RBC # FLD: 19.1 % — HIGH (ref 10.3–14.5)
RBC BLD AUTO: ABNORMAL
WBC # BLD: 16.56 K/UL — HIGH (ref 3.8–10.5)
WBC # FLD AUTO: 16.56 K/UL — HIGH (ref 3.8–10.5)

## 2020-11-17 PROCEDURE — 99214 OFFICE O/P EST MOD 30 MIN: CPT

## 2020-11-17 NOTE — CONSULT LETTER
[Dear  ___] : Dear ~PEDRO, [Courtesy Letter:] : I had the pleasure of seeing your patient, [unfilled], in my office today. [Sincerely,] : Sincerely, [DrGuille  ___] : Dr. MONTES [FreeTextEntry2] : Patrick Gonzales MD [FreeTextEntry3] : Nilo\par Noam Coker M.D., FACP\par Professor of Medicine\par [employer]\par Associate Chief, Division of Hematology\par [facility]\par [facility]\par [address]\[place] [address]\par [phone]\par \par \par \par

## 2020-11-17 NOTE — REVIEW OF SYSTEMS
[Joint Pain] : joint pain [Negative] : Allergic/Immunologic [FreeTextEntry6] : nasal congestion [de-identified] : Pruritus on L shin only

## 2020-11-17 NOTE — ASSESSMENT
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 The latter is not clinically significant. \par \par Weight loss, worsening anemia, increased blasts in peripheral blood and marrow, increased WBC indicate progression to more aggressive disease that warrants treatment. DIPSS score-6, high risk category. Treatment options explored with pt including Jakafi, alloSCT and clinical trials. Consultation at [facility] with Dr. Patrick Gonzales completed. Began therapy with Dacogen cycle 1. Then dacogen + Jakafi beginning cycle 2 which started 11/10/20. Has continued response with symptomatic improvement and resolution of splenomegaly.  Has occasional constipation-takes Miralax, benefiber, and Dulcolax.  \par \par Plan:\par Dacogen 20 mg/m2 IV daily x 5 every 4 weeks with Jakafi 10 mg which started with cycle 2.\par Allopurinol-pt takes this for gout.\par Has planned alloSCT for end of December-may need one more cycle before this.\par Echo this week, had PFT's.  These are being done pre-transplant. \par Reglan 10 mg po q6h prn nausea\par CMP, LDH, lipid profile\par RTC one week.\par  \par

## 2020-11-17 NOTE — HISTORY OF PRESENT ILLNESS
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 Spleen is smaller. Eating well, appetite has improved.  Back pain occasionally.  Scattered bony aches have now returned-mostly in legs, but also in ribs.  His pruritus has been tolerable, limited and stable. No other complaints. He notes no night sweats, shortness of breath, chest pain, headaches, visual problems, bleeding, bruising, leg pain/swelling, foot pain, BRBPR, melena. Has gained 6 lbs.  Has chronic cough, also runny nose.  Reports some constipation-uses Miralax, Benefiber and Senokot.

## 2020-11-17 NOTE — PHYSICAL EXAM
[Fully active, able to carry on all pre-disease performance without restriction] : Status 0 - Fully active, able to carry on all pre-disease performance without restriction [Normal] : affect appropriate [de-identified] : No dullness in Traube's space. [de-identified] : 3 x 3 cm lipoma on posterior left upper extremity

## 2020-11-19 ENCOUNTER — NON-APPOINTMENT (OUTPATIENT)
Age: 70
End: 2020-11-19

## 2020-11-19 ENCOUNTER — APPOINTMENT (OUTPATIENT)
Dept: CARDIOLOGY | Facility: CLINIC | Age: 70
End: 2020-11-19
Payer: MEDICARE

## 2020-11-19 ENCOUNTER — APPOINTMENT (OUTPATIENT)
Dept: HEMATOLOGY ONCOLOGY | Facility: CLINIC | Age: 70
End: 2020-11-19

## 2020-11-19 VITALS
SYSTOLIC BLOOD PRESSURE: 120 MMHG | TEMPERATURE: 97.9 F | WEIGHT: 74 LBS | BODY MASS INDEX: 10.8 KG/M2 | OXYGEN SATURATION: 98 % | HEART RATE: 79 BPM | DIASTOLIC BLOOD PRESSURE: 80 MMHG

## 2020-11-19 PROBLEM — J32.9 SINUSITIS: Status: ACTIVE | Noted: 2020-11-19

## 2020-11-19 PROCEDURE — 93306 TTE W/DOPPLER COMPLETE: CPT

## 2020-11-19 PROCEDURE — 93000 ELECTROCARDIOGRAM COMPLETE: CPT

## 2020-11-19 PROCEDURE — 99214 OFFICE O/P EST MOD 30 MIN: CPT

## 2020-11-24 ENCOUNTER — RESULT REVIEW (OUTPATIENT)
Age: 70
End: 2020-11-24

## 2020-11-24 ENCOUNTER — APPOINTMENT (OUTPATIENT)
Dept: HEMATOLOGY ONCOLOGY | Facility: CLINIC | Age: 70
End: 2020-11-24
Payer: MEDICARE

## 2020-11-24 ENCOUNTER — APPOINTMENT (OUTPATIENT)
Dept: SURGERY | Facility: CLINIC | Age: 70
End: 2020-11-24
Payer: MEDICARE

## 2020-11-24 VITALS
TEMPERATURE: 98.1 F | SYSTOLIC BLOOD PRESSURE: 115 MMHG | WEIGHT: 170 LBS | HEIGHT: 69 IN | RESPIRATION RATE: 15 BRPM | BODY MASS INDEX: 25.18 KG/M2 | HEART RATE: 86 BPM | DIASTOLIC BLOOD PRESSURE: 74 MMHG | OXYGEN SATURATION: 97 %

## 2020-11-24 VITALS
RESPIRATION RATE: 17 BRPM | BODY MASS INDEX: 25.18 KG/M2 | HEIGHT: 69 IN | SYSTOLIC BLOOD PRESSURE: 103 MMHG | TEMPERATURE: 98.1 F | HEART RATE: 99 BPM | OXYGEN SATURATION: 98 % | WEIGHT: 170 LBS | DIASTOLIC BLOOD PRESSURE: 70 MMHG

## 2020-11-24 DIAGNOSIS — K62.89 OTHER SPECIFIED DISEASES OF ANUS AND RECTUM: ICD-10-CM

## 2020-11-24 LAB
ANISOCYTOSIS BLD QL: SLIGHT — SIGNIFICANT CHANGE UP
BASOPHILS # BLD AUTO: 0.18 K/UL — SIGNIFICANT CHANGE UP (ref 0–0.2)
BASOPHILS NFR BLD AUTO: 1 % — SIGNIFICANT CHANGE UP (ref 0–2)
BIZARRE PLATELETS BLD QL SMEAR: PRESENT — SIGNIFICANT CHANGE UP
BLASTS # FLD: 3 % — HIGH (ref 0–0)
DACRYOCYTES BLD QL SMEAR: SLIGHT — SIGNIFICANT CHANGE UP
ELLIPTOCYTES BLD QL SMEAR: SLIGHT — SIGNIFICANT CHANGE UP
EOSINOPHIL # BLD AUTO: 0.18 K/UL — SIGNIFICANT CHANGE UP (ref 0–0.5)
EOSINOPHIL NFR BLD AUTO: 1 % — SIGNIFICANT CHANGE UP (ref 0–6)
GIANT PLATELETS BLD QL SMEAR: PRESENT — SIGNIFICANT CHANGE UP
HCT VFR BLD CALC: 32.2 % — LOW (ref 39–50)
HGB BLD-MCNC: 9.7 G/DL — LOW (ref 13–17)
HYPOCHROMIA BLD QL: SLIGHT — SIGNIFICANT CHANGE UP
HYPOGRANULAR PLT: PRESENT
LG PLATELETS BLD QL AUTO: SLIGHT — SIGNIFICANT CHANGE UP
LYMPHOCYTES # BLD AUTO: 23 % — SIGNIFICANT CHANGE UP (ref 13–44)
LYMPHOCYTES # BLD AUTO: 4.15 K/UL — HIGH (ref 1–3.3)
MCHC RBC-ENTMCNC: 26.8 PG — LOW (ref 27–34)
MCHC RBC-ENTMCNC: 30.1 G/DL — LOW (ref 32–36)
MCV RBC AUTO: 89 FL — SIGNIFICANT CHANGE UP (ref 80–100)
METAMYELOCYTES # FLD: 4 % — HIGH (ref 0–0)
MONOCYTES # BLD AUTO: 1.44 K/UL — HIGH (ref 0–0.9)
MONOCYTES NFR BLD AUTO: 8 % — SIGNIFICANT CHANGE UP (ref 2–14)
MYELOCYTES NFR BLD: 10 % — HIGH (ref 0–0)
NEUTROPHILS # BLD AUTO: 9.02 K/UL — HIGH (ref 1.8–7.4)
NEUTROPHILS NFR BLD AUTO: 48 % — SIGNIFICANT CHANGE UP (ref 43–77)
NEUTS BAND # BLD: 2 % — SIGNIFICANT CHANGE UP (ref 0–8)
NRBC # BLD: 5 /100 — HIGH (ref 0–0)
NRBC # BLD: SIGNIFICANT CHANGE UP /100 WBCS (ref 0–0)
OVALOCYTES BLD QL SMEAR: SLIGHT — SIGNIFICANT CHANGE UP
PLAT MORPH BLD: NORMAL — SIGNIFICANT CHANGE UP
PLATELET # BLD AUTO: 707 K/UL — HIGH (ref 150–400)
POIKILOCYTOSIS BLD QL AUTO: SLIGHT — SIGNIFICANT CHANGE UP
POLYCHROMASIA BLD QL SMEAR: SLIGHT — SIGNIFICANT CHANGE UP
RBC # BLD: 3.62 M/UL — LOW (ref 4.2–5.8)
RBC # FLD: 18.6 % — HIGH (ref 10.3–14.5)
RBC BLD AUTO: ABNORMAL
SCHISTOCYTES BLD QL AUTO: SLIGHT — SIGNIFICANT CHANGE UP
WBC # BLD: 18.04 K/UL — HIGH (ref 3.8–10.5)
WBC # FLD AUTO: 18.04 K/UL — HIGH (ref 3.8–10.5)

## 2020-11-24 PROCEDURE — 99204 OFFICE O/P NEW MOD 45 MIN: CPT

## 2020-11-24 PROCEDURE — 99214 OFFICE O/P EST MOD 30 MIN: CPT

## 2020-11-24 PROCEDURE — 46600 DIAGNOSTIC ANOSCOPY SPX: CPT

## 2020-11-24 RX ORDER — AMOXICILLIN AND CLAVULANATE POTASSIUM 875; 125 MG/1; MG/1
875-125 TABLET, COATED ORAL TWICE DAILY
Qty: 28 | Refills: 0 | Status: DISCONTINUED | COMMUNITY
Start: 2020-11-19 | End: 2020-11-24

## 2020-11-25 ENCOUNTER — TRANSCRIPTION ENCOUNTER (OUTPATIENT)
Age: 70
End: 2020-11-25

## 2020-11-27 ENCOUNTER — RESULT CHARGE (OUTPATIENT)
Age: 70
End: 2020-11-27

## 2020-11-28 ENCOUNTER — OUTPATIENT (OUTPATIENT)
Dept: OUTPATIENT SERVICES | Facility: HOSPITAL | Age: 70
LOS: 1 days | Discharge: HOME | End: 2020-11-28

## 2020-11-28 DIAGNOSIS — D47.3 ESSENTIAL (HEMORRHAGIC) THROMBOCYTHEMIA: ICD-10-CM

## 2020-11-28 NOTE — HISTORY OF PRESENT ILLNESS
[FreeTextEntry1] : Since late February 2020, he has had occasional episodes of substernal "tightness" in his chest lasting several minutes, unrelated to exertion.  There is no associated shortness of breath or palpitations. \par He has also had occasional sharp right and left-sided sharp chest pains lasting several seconds.  \par He has had occasional racing in his chest, but denies that these are palpitations.\par Hemorrhoid flare-up\par Right pinky paronychia.\par Pressure  in head; antibiotics have helped in the past.\par Takes Aleve for back pain.

## 2020-12-01 ENCOUNTER — APPOINTMENT (OUTPATIENT)
Dept: CARDIOLOGY | Facility: CLINIC | Age: 70
End: 2020-12-01
Payer: MEDICARE

## 2020-12-01 ENCOUNTER — RESULT REVIEW (OUTPATIENT)
Age: 70
End: 2020-12-01

## 2020-12-01 ENCOUNTER — APPOINTMENT (OUTPATIENT)
Dept: HEMATOLOGY ONCOLOGY | Facility: CLINIC | Age: 70
End: 2020-12-01
Payer: MEDICARE

## 2020-12-01 VITALS
DIASTOLIC BLOOD PRESSURE: 86 MMHG | RESPIRATION RATE: 15 BRPM | HEART RATE: 85 BPM | TEMPERATURE: 97.2 F | HEIGHT: 69 IN | SYSTOLIC BLOOD PRESSURE: 136 MMHG | BODY MASS INDEX: 25.18 KG/M2 | OXYGEN SATURATION: 98 % | WEIGHT: 170 LBS

## 2020-12-01 LAB
ANISOCYTOSIS BLD QL: SLIGHT — SIGNIFICANT CHANGE UP
BASOPHILS # BLD AUTO: 0.33 K/UL — HIGH (ref 0–0.2)
BASOPHILS NFR BLD AUTO: 2 % — SIGNIFICANT CHANGE UP (ref 0–2)
BLASTS # FLD: 3 % — HIGH (ref 0–0)
DACRYOCYTES BLD QL SMEAR: SLIGHT — SIGNIFICANT CHANGE UP
ELLIPTOCYTES BLD QL SMEAR: SLIGHT — SIGNIFICANT CHANGE UP
EOSINOPHIL # BLD AUTO: 0 K/UL — SIGNIFICANT CHANGE UP (ref 0–0.5)
EOSINOPHIL NFR BLD AUTO: 0 % — SIGNIFICANT CHANGE UP (ref 0–6)
HYPOCHROMIA BLD QL: SLIGHT — SIGNIFICANT CHANGE UP
LG PLATELETS BLD QL AUTO: SLIGHT — SIGNIFICANT CHANGE UP
LYMPHOCYTES # BLD AUTO: 24 % — SIGNIFICANT CHANGE UP (ref 13–44)
LYMPHOCYTES # BLD AUTO: 4.02 K/UL — HIGH (ref 1–3.3)
METAMYELOCYTES # FLD: 3 % — HIGH (ref 0–0)
MONOCYTES # BLD AUTO: 0.67 K/UL — SIGNIFICANT CHANGE UP (ref 0–0.9)
MONOCYTES NFR BLD AUTO: 4 % — SIGNIFICANT CHANGE UP (ref 2–14)
MYELOCYTES NFR BLD: 9 % — HIGH (ref 0–0)
NEUTROPHILS # BLD AUTO: 8.87 K/UL — HIGH (ref 1.8–7.4)
NEUTROPHILS NFR BLD AUTO: 50 % — SIGNIFICANT CHANGE UP (ref 43–77)
NEUTS BAND # BLD: 3 % — SIGNIFICANT CHANGE UP (ref 0–8)
NRBC # BLD: 5 /100 — HIGH (ref 0–0)
NRBC # BLD: SIGNIFICANT CHANGE UP /100 WBCS (ref 0–0)
OVALOCYTES BLD QL SMEAR: SLIGHT — SIGNIFICANT CHANGE UP
PLAT MORPH BLD: NORMAL — SIGNIFICANT CHANGE UP
POIKILOCYTOSIS BLD QL AUTO: SLIGHT — SIGNIFICANT CHANGE UP
POLYCHROMASIA BLD QL SMEAR: SLIGHT — SIGNIFICANT CHANGE UP
PROMYELOCYTES # FLD: 2 % — HIGH (ref 0–0)
RBC BLD AUTO: ABNORMAL
SCHISTOCYTES BLD QL AUTO: SLIGHT — SIGNIFICANT CHANGE UP

## 2020-12-01 PROCEDURE — A9500: CPT

## 2020-12-01 PROCEDURE — 99214 OFFICE O/P EST MOD 30 MIN: CPT

## 2020-12-01 PROCEDURE — 93015 CV STRESS TEST SUPVJ I&R: CPT

## 2020-12-01 PROCEDURE — 78452 HT MUSCLE IMAGE SPECT MULT: CPT

## 2020-12-02 ENCOUNTER — TRANSCRIPTION ENCOUNTER (OUTPATIENT)
Age: 70
End: 2020-12-02

## 2020-12-03 ENCOUNTER — OUTPATIENT (OUTPATIENT)
Dept: OUTPATIENT SERVICES | Facility: HOSPITAL | Age: 70
LOS: 1 days | End: 2020-12-03
Payer: MEDICARE

## 2020-12-03 ENCOUNTER — NON-APPOINTMENT (OUTPATIENT)
Age: 70
End: 2020-12-03

## 2020-12-03 ENCOUNTER — APPOINTMENT (OUTPATIENT)
Dept: MRI IMAGING | Facility: CLINIC | Age: 70
End: 2020-12-03
Payer: MEDICARE

## 2020-12-03 DIAGNOSIS — K62.89 OTHER SPECIFIED DISEASES OF ANUS AND RECTUM: ICD-10-CM

## 2020-12-03 PROCEDURE — 72197 MRI PELVIS W/O & W/DYE: CPT | Mod: 26

## 2020-12-03 PROCEDURE — 72197 MRI PELVIS W/O & W/DYE: CPT

## 2020-12-03 PROCEDURE — A9585: CPT

## 2020-12-04 ENCOUNTER — NON-APPOINTMENT (OUTPATIENT)
Age: 70
End: 2020-12-04

## 2020-12-04 DIAGNOSIS — R94.39 ABNORMAL RESULT OF OTHER CARDIOVASCULAR FUNCTION STUDY: ICD-10-CM

## 2020-12-04 PROBLEM — K62.89 ANORECTAL PAIN: Status: ACTIVE | Noted: 2020-12-04

## 2020-12-04 NOTE — PHYSICAL EXAM
[FreeTextEntry1] : This is a 70 year-old well-developed male in no apparent distress.\par \par HEENT normocephalic, anicteric, external ears normal bilaterally, EOMs intact.\par \par Cardiac - regular rate and rhythm.\par \par Abdomen soft, nontender, nondistended, no masses. No hepatosplenomegaly.\par \par No inguinal lymphadenopathy bilaterally.\par \par Examination of the perineum reveals an indurated, painful and thickened area along the left anterolateral perineum. This extends for approximately 4 cm. There is no fluctuance and no ulceration. \par Digital rectal examination is painful; there is diminished tone. \par Anoscopy is very limited d/t pain, reveals darkish red-gray discharge.  \par \par Neuro-cranial nerves grossly intact. Normal gait.\par \par Psychiatric-oriented to time place and person. Good understanding of conversation.\par \par

## 2020-12-04 NOTE — CONSULT LETTER
[Dear  ___] : Dear  [unfilled], [Please see my note below.] : Please see my note below. [Sincerely,] : Sincerely, [FreeTextEntry2] : Dr Noam Coker [FreeTextEntry1] : I saw Lincoln Yoon for anorectal pain in the office today. He has a concerning anorectal exam. I will obtain a pelvic MRI. He may need an EUA.  [FreeTextEntry3] : Nayeli Rios M.D., F.A.C.S., F.JORGE.S.C.R.S.\par Assistant Professor of Surgery\par [employer] at [facility]\par \par

## 2020-12-04 NOTE — PHYSICAL EXAM
[Fully active, able to carry on all pre-disease performance without restriction] : Status 0 - Fully active, able to carry on all pre-disease performance without restriction [Normal] : affect appropriate [de-identified] : No dullness in Traube's space. [de-identified] : 3 x 3 cm lipoma on posterior left upper extremity

## 2020-12-04 NOTE — PHYSICAL EXAM
[Fully active, able to carry on all pre-disease performance without restriction] : Status 0 - Fully active, able to carry on all pre-disease performance without restriction [Normal] : affect appropriate [de-identified] : No dullness in Traube's space. [de-identified] : R first finger swelling and redness around nail bed

## 2020-12-04 NOTE — HISTORY OF PRESENT ILLNESS
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  Hemorrhoid is more inflamed, small amount of bleeding noted.   Reports feeling tired and weak.  Attempting to control constipation with Miralax, Benefiber and Senokot.  Cycle 2 of Dacogen was 11/10, started Jakafi BID this cycle.  Decreased Jakafi to once daily a week ago.  Denies fevers, chills, night sweats, bleeding, abdominal pain.  Has chronic tenesmus.  Nasal congestion has improved.  Paronychia of R hand is improving- on Dicloxacillin until Thursday.  Seeing Dr Altamirano/Pradip at [facility] for SCT in December.

## 2020-12-04 NOTE — ASSESSMENT
[FreeTextEntry1] : 69 yo male with significantly abnormal anorectal exam, differential includes but not limited to severe hemorrhoidal flare, anal margin mass. Typically would proceed with an EUA. Given he is undergoing chemotherapy and scheduled to have stem cell tx, will obtain first pelvic MRI and then decide on steps for management.

## 2020-12-04 NOTE — CONSULT LETTER
[Dear  ___] : Dear ~PEDRO, [Courtesy Letter:] : I had the pleasure of seeing your patient, [unfilled], in my office today. [Sincerely,] : Sincerely, [DrGuille  ___] : Dr. MONTES [FreeTextEntry2] : Patrick Gonzales MD [FreeTextEntry3] : Nilo\par Noam Coker M.D., FACP\par Professor of Medicine\par [employer]\par Associate Chief, Division of Hematology\par [facility]\par [facility]\par [address]\[place] [address]\par [phone]\par \par \par \par

## 2020-12-04 NOTE — ASSESSMENT
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 The latter is not clinically significant. \par \par Weight loss, worsening anemia, increased blasts in peripheral blood and marrow, increased WBC indicate progression to more aggressive disease that warrants treatment. DIPSS score-6, high risk category. Treatment options explored with pt including Jakafi, alloSCT and clinical trials. Consultation at [facility] with Dr. Patrick Gonzales completed. Began therapy with Dacogen cycle 1. Received dacogen + Jakafi cycle 2 which started 11/10/20. Has continued response with symptomatic improvement and resolution of splenomegaly. On 11/19, developed an external hemorrhoid-reports pain to area.  Is trying to manage constipation to provide relief.  Is using Sitz baths and prescription hemorrhoid suppository.   Saw Colorectal surgery and is having MRI to rule out malignancy.  R 5th finger paronychia-improving.  \par \par Plan:\par Now taking Jakafi once a day.  \par Allopurinol-pt takes this for gout.\par Has planned alloSCT for end of December-may need one more cycle before this.\par Painful external hemorrhoid-is seeing Colorectal surgery.  Has MRI on 12/3/20 to rule out malignancy.\par Reglan 10 mg po q6h prn nausea\par CMP, LDH, lipid profile\par RTC one week.\par  \par

## 2020-12-04 NOTE — HISTORY OF PRESENT ILLNESS
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  Hemorrhoid is not bleeding.   Reports feeling tired and weak.  Attempting to control constipation with Miralax, Benefiber and Senokot.  Cycle 2 of Dacogen was 11/10, started Jakafi BID this cycle.  Decreased Jakafi to once daily yesterday.  Denies fevers, chills, night sweats, bleeding, abdominal pain.  Has chronic tenesmus.  Nasal congestion has improved.  R first finger paronychia with redness and swelling noted around nail bed.

## 2020-12-04 NOTE — HISTORY OF PRESENT ILLNESS
[FreeTextEntry1] : The patient is a 70 year old male here for anorectal pain and a painful perianal lump. Today, pt reports rectal pain and pressure that began last Wednesday 11/18. Patient has dx of myeloproliferative disorder and is s/p 2 rounds of Chemo in preparation of stem cell transplant ( tentative surgery in December.) Pt experienced developed constipation following second round of chemo. He is using Hydrocortisone cream with little results. He reports difficulty urinating and evacuating stool. He was incontinent the last day or two. Last BM this morning, soft. Reports occasional BRBPR noted on stool.\par Reports no external perianal growth before last week. Reports he had another episode of worse perianal swelling few months ago that eventually resolved.  \par Pt was last seen 3/9/2015 for hemorrhoidal disease and had RBL.  \par Last colonoscopy was 7/10/19 by Dr. Noam Riley. Findings demonstrate non-bleeding external and internal hemorrhoids found during retroflexion and during perianal exam. Many diverticula were found in the sigmoid, descending colon, transverse colon, ascending colon and cecum. There was a sharp turn in the mid-sigmoid, making passage of the scope difficult. The terminal ileum contained a single non-bleeding 3 mm ulcer. Biopsies were taken with cold forceps for histology to r/o microscopic colitis. \par \par \par

## 2020-12-04 NOTE — REVIEW OF SYSTEMS
[Abdominal Pain] : abdominal pain [Constipation] : constipation [Joint Pain] : joint pain [Negative] : Allergic/Immunologic [Skin Wound] : skin wound [FreeTextEntry7] : external hemorrhoid [de-identified] : Pruritus on L shin only, R first finger paronychia improved.

## 2020-12-04 NOTE — CONSULT LETTER
[Dear  ___] : Dear ~PEDRO, [Courtesy Letter:] : I had the pleasure of seeing your patient, [unfilled], in my office today. [Sincerely,] : Sincerely, [DrGuille  ___] : Dr. MONTES [FreeTextEntry2] : Patrick Gonzales MD [FreeTextEntry3] : Nilo\par Noam Coker M.D., FACP\par Professor of Medicine\par [employer] [facility] [employer]\par Associate Chief, Division of Hematology\par [facility]\par [facility]\par [address]\[place] [address]\par [phone]\par \par \par \par

## 2020-12-04 NOTE — REVIEW OF SYSTEMS
[Joint Pain] : joint pain [Negative] : Allergic/Immunologic [Abdominal Pain] : abdominal pain [Constipation] : constipation [FreeTextEntry7] : external hemorrhoid [de-identified] : Pruritus on L shin only

## 2020-12-04 NOTE — ASSESSMENT
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 The latter is not clinically significant. \par \par Weight loss, worsening anemia, increased blasts in peripheral blood and marrow, increased WBC indicate progression to more aggressive disease that warrants treatment. DIPSS score-6, high risk category. Treatment options explored with pt including Jakafi, alloSCT and clinical trials. Consultation at [facility] with Dr. Patrick Gonzales completed. Began therapy with Dacogen cycle 1. Received dacogen + Jakafi cycle 2 which started 11/10/20. Has continued response with symptomatic improvement and resolution of splenomegaly. On 11/19, developed an external hemorrhoid-reports pain to area.  Is trying to manage constipation to provide relief.  Is using Sitz baths and prescription hemorrhoid suppository.   Saw Colorectal surgery and is having MRI to rule out malignancy.  \par \par Plan:\par Dacogen 20 mg/m2 IV daily x 5 every 4 weeks with Jakafi 10 mg which started with cycle 2.\par Allopurinol-pt takes this for gout.\par Has planned alloSCT for end of December-may need one more cycle before this.\par Painful external hemorrhoid-is seeing Colorectal surgery.\par Reglan 10 mg po q6h prn nausea\par CMP, LDH, lipid profile\par RTC one week.\par  \par

## 2020-12-07 ENCOUNTER — APPOINTMENT (OUTPATIENT)
Dept: INFUSION THERAPY | Facility: HOSPITAL | Age: 70
End: 2020-12-07

## 2020-12-07 ENCOUNTER — APPOINTMENT (OUTPATIENT)
Dept: SURGERY | Facility: CLINIC | Age: 70
End: 2020-12-07
Payer: MEDICARE

## 2020-12-07 ENCOUNTER — TRANSCRIPTION ENCOUNTER (OUTPATIENT)
Age: 70
End: 2020-12-07

## 2020-12-07 VITALS
WEIGHT: 170 LBS | RESPIRATION RATE: 18 BRPM | SYSTOLIC BLOOD PRESSURE: 143 MMHG | HEART RATE: 81 BPM | TEMPERATURE: 98.2 F | DIASTOLIC BLOOD PRESSURE: 83 MMHG | HEIGHT: 69 IN | BODY MASS INDEX: 25.18 KG/M2

## 2020-12-07 PROCEDURE — 99214 OFFICE O/P EST MOD 30 MIN: CPT

## 2020-12-08 ENCOUNTER — RESULT REVIEW (OUTPATIENT)
Age: 70
End: 2020-12-08

## 2020-12-08 ENCOUNTER — RX RENEWAL (OUTPATIENT)
Age: 70
End: 2020-12-08

## 2020-12-08 ENCOUNTER — APPOINTMENT (OUTPATIENT)
Dept: HEMATOLOGY ONCOLOGY | Facility: CLINIC | Age: 70
End: 2020-12-08
Payer: MEDICARE

## 2020-12-08 ENCOUNTER — NON-APPOINTMENT (OUTPATIENT)
Age: 70
End: 2020-12-08

## 2020-12-08 ENCOUNTER — APPOINTMENT (OUTPATIENT)
Dept: CARDIOLOGY | Facility: CLINIC | Age: 70
End: 2020-12-08

## 2020-12-08 ENCOUNTER — APPOINTMENT (OUTPATIENT)
Dept: INFUSION THERAPY | Facility: HOSPITAL | Age: 70
End: 2020-12-08

## 2020-12-08 VITALS
HEART RATE: 84 BPM | TEMPERATURE: 97.3 F | BODY MASS INDEX: 25.77 KG/M2 | DIASTOLIC BLOOD PRESSURE: 80 MMHG | OXYGEN SATURATION: 100 % | HEIGHT: 69 IN | WEIGHT: 173.99 LBS | RESPIRATION RATE: 16 BRPM | SYSTOLIC BLOOD PRESSURE: 144 MMHG

## 2020-12-08 LAB
ANISOCYTOSIS BLD QL: SLIGHT — SIGNIFICANT CHANGE UP
BASOPHILS # BLD AUTO: 0.59 K/UL — HIGH (ref 0–0.2)
BASOPHILS NFR BLD AUTO: 4 % — HIGH (ref 0–2)
BLASTS # FLD: 6 % — HIGH (ref 0–0)
DACRYOCYTES BLD QL SMEAR: SLIGHT — SIGNIFICANT CHANGE UP
ELLIPTOCYTES BLD QL SMEAR: SLIGHT — SIGNIFICANT CHANGE UP
EOSINOPHIL # BLD AUTO: 0.15 K/UL — SIGNIFICANT CHANGE UP (ref 0–0.5)
EOSINOPHIL NFR BLD AUTO: 1 % — SIGNIFICANT CHANGE UP (ref 0–6)
HCT VFR BLD CALC: 33.9 % — LOW (ref 39–50)
HGB BLD-MCNC: 10.1 G/DL — LOW (ref 13–17)
HYPOCHROMIA BLD QL: SLIGHT — SIGNIFICANT CHANGE UP
LYMPHOCYTES # BLD AUTO: 23 % — SIGNIFICANT CHANGE UP (ref 13–44)
LYMPHOCYTES # BLD AUTO: 3.37 K/UL — HIGH (ref 1–3.3)
MCHC RBC-ENTMCNC: 27.2 PG — SIGNIFICANT CHANGE UP (ref 27–34)
MCHC RBC-ENTMCNC: 29.8 G/DL — LOW (ref 32–36)
MCV RBC AUTO: 91.4 FL — SIGNIFICANT CHANGE UP (ref 80–100)
METAMYELOCYTES # FLD: 2 % — HIGH (ref 0–0)
MONOCYTES # BLD AUTO: 1.76 K/UL — HIGH (ref 0–0.9)
MONOCYTES NFR BLD AUTO: 12 % — SIGNIFICANT CHANGE UP (ref 2–14)
MYELOCYTES NFR BLD: 3 % — HIGH (ref 0–0)
NEUTROPHILS # BLD AUTO: 6.89 K/UL — SIGNIFICANT CHANGE UP (ref 1.8–7.4)
NEUTROPHILS NFR BLD AUTO: 42 % — LOW (ref 43–77)
NEUTS BAND # BLD: 5 % — SIGNIFICANT CHANGE UP (ref 0–8)
NRBC # BLD: 6 /100 — HIGH (ref 0–0)
NRBC # BLD: SIGNIFICANT CHANGE UP /100 WBCS (ref 0–0)
PLAT MORPH BLD: NORMAL — SIGNIFICANT CHANGE UP
PLATELET # BLD AUTO: 402 K/UL — HIGH (ref 150–400)
POIKILOCYTOSIS BLD QL AUTO: SLIGHT — SIGNIFICANT CHANGE UP
POLYCHROMASIA BLD QL SMEAR: SLIGHT — SIGNIFICANT CHANGE UP
PROMYELOCYTES # FLD: 2 % — HIGH (ref 0–0)
RBC # BLD: 3.71 M/UL — LOW (ref 4.2–5.8)
RBC # FLD: 19.6 % — HIGH (ref 10.3–14.5)
RBC BLD AUTO: ABNORMAL
SCHISTOCYTES BLD QL AUTO: SLIGHT — SIGNIFICANT CHANGE UP
WBC # BLD: 14.65 K/UL — HIGH (ref 3.8–10.5)
WBC # FLD AUTO: 14.65 K/UL — HIGH (ref 3.8–10.5)

## 2020-12-08 PROCEDURE — 99214 OFFICE O/P EST MOD 30 MIN: CPT

## 2020-12-09 ENCOUNTER — APPOINTMENT (OUTPATIENT)
Dept: CARDIOLOGY | Facility: CLINIC | Age: 70
End: 2020-12-09

## 2020-12-09 ENCOUNTER — RESULT REVIEW (OUTPATIENT)
Age: 70
End: 2020-12-09

## 2020-12-09 ENCOUNTER — APPOINTMENT (OUTPATIENT)
Dept: HEMATOLOGY ONCOLOGY | Facility: CLINIC | Age: 70
End: 2020-12-09
Payer: MEDICARE

## 2020-12-09 ENCOUNTER — OUTPATIENT (OUTPATIENT)
Dept: OUTPATIENT SERVICES | Facility: HOSPITAL | Age: 70
LOS: 1 days | End: 2020-12-09
Payer: MEDICARE

## 2020-12-09 ENCOUNTER — NON-APPOINTMENT (OUTPATIENT)
Age: 70
End: 2020-12-09

## 2020-12-09 ENCOUNTER — APPOINTMENT (OUTPATIENT)
Dept: INFUSION THERAPY | Facility: HOSPITAL | Age: 70
End: 2020-12-09

## 2020-12-09 DIAGNOSIS — R07.9 CHEST PAIN, UNSPECIFIED: ICD-10-CM

## 2020-12-09 DIAGNOSIS — I25.10 ATHEROSCLEROTIC HEART DISEASE OF NATIVE CORONARY ARTERY WITHOUT ANGINA PECTORIS: ICD-10-CM

## 2020-12-09 DIAGNOSIS — R94.39 ABNORMAL RESULT OF OTHER CARDIOVASCULAR FUNCTION STUDY: ICD-10-CM

## 2020-12-09 PROCEDURE — 75574 CT ANGIO HRT W/3D IMAGE: CPT | Mod: 26

## 2020-12-09 PROCEDURE — 75574 CT ANGIO HRT W/3D IMAGE: CPT

## 2020-12-09 PROCEDURE — 99441: CPT | Mod: 95

## 2020-12-09 NOTE — HISTORY OF PRESENT ILLNESS
[FreeTextEntry1] : The patient is a 70 year old male here for a follow up visit for anorectal pain.  I saw him on 11/24 for the same and at the time his exam was consistent with very large external hemorrhoid with concern for malignancy.  Since then he has had an MRI of the pelvis which was noncontributory.  Today he reports anorectal pain. BRBPR on toilet paper. Reports incomplete evacuation of stool. Having 4-5 Bm;s daily.  My office is scheduling him for a preop CT of the abdomen pelvis as per Radiology recommendation and for an exam under anesthesia.\par Patient has dx of myeloproliferative disorder and is s/p 2 rounds of Chemo in preparation of stem cell transplant ( tentative surgery in December.) \par Last colonoscopy was 7/10/19 by Dr. Noam Riley. Findings demonstrate non-bleeding external and internal hemorrhoids found during retroflexion and during perianal exam. Many diverticula were found in the sigmoid, descending colon, transverse colon, ascending colon and cecum. There was a sharp turn in the mid-sigmoid, making passage of the scope difficult. The terminal ileum contained a single non-bleeding 3 mm ulcer. Biopsies were taken with cold forceps for histology to r/o microscopic colitis.\par MRI of the pelvis from 12/03 demonstrated abnormality at the posterior aspect of the proximal anal canal which may be related to involution of the rectum and rectal prolapse, however a contained perforation cannot be excluded and further evaluation is recommended with a CT abdomen/pelvis with IV and oral contrast. Additional 3.8 x 1.4 cm structure extending outside anal canal which may be related to rectal prolapse or a hemorrhoid.

## 2020-12-09 NOTE — ASSESSMENT
[FreeTextEntry1] : 69 yo male with a large perianal mass.  This is concerning for malignancy.  It certainly does not appear consistent with a severe hemorrhoidal flare-up although that would be the best case scenario for the patient. I recommended expeditious EUA with possible excision of the perianal mass, biopsies, possible "hemorrhoidectomy". I discussed the details, risks, benefits and alternatives of the procedure and expectations of recovery with the patient. \par My office is in the process of scheduling the procedure.

## 2020-12-09 NOTE — PHYSICAL EXAM
[FreeTextEntry1] : This is a 70 year-old well-developed male in no apparent distress.\par \par Examination of the perineum reveals a large left-sided perianal mass. The growth is better circumscribed in today's exam. It is indurated.  It measures 5 cm in length by 3 cm in width.  It extends up to the anus and it encompasses the entire left side of the perineum.  There is no evidence of infection/abscess. \par Digital rectal examination not done as patient is uncomfortable and an EUA is pending.\par

## 2020-12-09 NOTE — CONSULT LETTER
[Dear  ___] : Dear  [unfilled], [Please see my note below.] : Please see my note below. [Sincerely,] : Sincerely, [DrGuille ___] : Dr. MONTES [DrGuille  ___] : Dr. MONTES [FreeTextEntry2] : Dr Noam Coker [FreeTextEntry1] : I saw Lincoln Yoon in follow-up in the office today. His exam is very concerning for a malignant perianal mass. He is being scheduled for an EUA.  [FreeTextEntry3] : Nayeli Rios M.D., F.A.C.S., F.JORGE.S.C.R.S.\par Assistant Professor of Surgery\par [employer] at [facility]\par \par

## 2020-12-10 ENCOUNTER — APPOINTMENT (OUTPATIENT)
Dept: CT IMAGING | Facility: IMAGING CENTER | Age: 70
End: 2020-12-10

## 2020-12-10 ENCOUNTER — APPOINTMENT (OUTPATIENT)
Dept: INFUSION THERAPY | Facility: HOSPITAL | Age: 70
End: 2020-12-10

## 2020-12-10 NOTE — CONSULT LETTER
[Dear  ___] : Dear ~PEDRO, [Courtesy Letter:] : I had the pleasure of seeing your patient, [unfilled], in my office today. [Sincerely,] : Sincerely, [DrGuille  ___] : Dr. MONTES [FreeTextEntry2] : Patrick Gonzales MD [FreeTextEntry3] : Nilo\par Noam Coker M.D., FACP\par Professor of Medicine\par [employer]\par Associate Chief, Division of Hematology\par [facility]\par [facility]\par [address]\[place] [address]\par [phone]\par \par \par \par

## 2020-12-10 NOTE — REVIEW OF SYSTEMS
[Abdominal Pain] : abdominal pain [Constipation] : constipation [Joint Pain] : joint pain [Skin Wound] : skin wound [Negative] : Allergic/Immunologic [FreeTextEntry7] : external hemorrhoid [de-identified] : Pruritus on L shin only, R first finger paronychia improved.

## 2020-12-10 NOTE — ASSESSMENT
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 The latter is not clinically significant. \par \par Weight loss, worsening anemia, increased blasts in peripheral blood and marrow, increased WBC indicate progression to more aggressive disease that warrants treatment. DIPSS score-6, high risk category. Treatment options explored with pt including Jakafi, alloSCT and clinical trials. Consultation at [facility] with Dr. Patrick Gonzales completed. Began therapy with Dacogen cycle 1. Received dacogen + Jakafi cycle 2 which started 11/10/20. Has continued response with symptomatic improvement and resolution of splenomegaly. On 11/19, developed an external hemorrhoid-reports pain to area.  Is trying to manage constipation to provide relief.  Is using Sitz baths and prescription hemorrhoid suppository.  Pt having hemorrhoidectomy on 12/15 by Dr Rios.   R 5th finger paronychia persists-Dicloxacillin-5 more days ordered.  \par \par Plan:\par Jakafi once a day.  \par Allopurinol-pt takes this for gout.\par Having hemorrhoidectomy on 12/15. Dr Coker spoke to colorectal surgeon who will give 1 unit of plts during surgery d/t platelet dysfunction.\par Has planned alloSCT at [facility]; this is postponed until hemorrhoid surgery complete.\par Reglan 10 mg po q6h prn nausea\par CMP, LDH, lipid profile\par RTC one week.\par  \par

## 2020-12-10 NOTE — PHYSICAL EXAM
[Fully active, able to carry on all pre-disease performance without restriction] : Status 0 - Fully active, able to carry on all pre-disease performance without restriction [Normal] : affect appropriate [de-identified] : No dullness in Traube's space. [de-identified] : R first finger swelling and redness around nail bed

## 2020-12-10 NOTE — HISTORY OF PRESENT ILLNESS
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  Hemorrhoid is more inflamed, small amount of bleeding noted.   Reports feeling tired and weak.  Attempting to control constipation with Miralax, Benefiber and Senokot.  Cycle 2 of Dacogen was 11/10, started Jakafi BID this cycle.  Decreased Jakafi to once daily a week ago.  Denies fevers, chills, night sweats, bleeding, abdominal pain.  Has chronic tenesmus.  Nasal congestion has improved.  Being followed by Dr Altamirano/Pradip at [facility] for SCT in December; this will be postponed until hemorrhoid surgery has been completed.  Having hemorrhoidectomy on 12/15 with Dr Rios.  Hemorrhoid is a little less painful-less constipation.  R 5th finger paronychia persists-will order 5 more days of Dicloxacillin.   \par \par MRI of the pelvis from 12/03 demonstrated abnormality at the posterior aspect of the proximal anal canal which may be related to involution of the rectum and rectal prolapse, however a contained perforation cannot be excluded and further evaluation is recommended with a CT abdomen/pelvis with IV and oral contrast. Additional 3.8 x 1.4 cm structure extending outside anal canal which may be related to rectal prolapse or a hemorrhoid. \par  \par

## 2020-12-10 NOTE — RESULTS/DATA
[FreeTextEntry1] : WBC 19531 Hgb 10.1 Hct 33.9 Platelets 402,000 \par \par MRI of the pelvis from 12/03 demonstrated abnormality at the posterior aspect of the proximal anal canal which may be related to involution of the rectum and rectal prolapse, however a contained perforation cannot be excluded and further evaluation is recommended with a CT abdomen/pelvis with IV and oral contrast. Additional 3.8 x 1.4 cm structure extending outside anal canal which may be related to rectal prolapse or a hemorrhoid. \par  \par

## 2020-12-11 ENCOUNTER — APPOINTMENT (OUTPATIENT)
Dept: CT IMAGING | Facility: CLINIC | Age: 70
End: 2020-12-11
Payer: MEDICARE

## 2020-12-11 ENCOUNTER — OUTPATIENT (OUTPATIENT)
Dept: OUTPATIENT SERVICES | Facility: HOSPITAL | Age: 70
LOS: 1 days | End: 2020-12-11

## 2020-12-11 ENCOUNTER — OUTPATIENT (OUTPATIENT)
Dept: OUTPATIENT SERVICES | Facility: HOSPITAL | Age: 70
LOS: 1 days | End: 2020-12-11
Payer: MEDICARE

## 2020-12-11 ENCOUNTER — APPOINTMENT (OUTPATIENT)
Dept: INFUSION THERAPY | Facility: HOSPITAL | Age: 70
End: 2020-12-11

## 2020-12-11 VITALS
HEIGHT: 69 IN | RESPIRATION RATE: 18 BRPM | OXYGEN SATURATION: 99 % | TEMPERATURE: 98 F | DIASTOLIC BLOOD PRESSURE: 78 MMHG | SYSTOLIC BLOOD PRESSURE: 130 MMHG | HEART RATE: 85 BPM

## 2020-12-11 DIAGNOSIS — K62.89 OTHER SPECIFIED DISEASES OF ANUS AND RECTUM: ICD-10-CM

## 2020-12-11 DIAGNOSIS — I10 ESSENTIAL (PRIMARY) HYPERTENSION: ICD-10-CM

## 2020-12-11 DIAGNOSIS — Z01.818 ENCOUNTER FOR OTHER PREPROCEDURAL EXAMINATION: ICD-10-CM

## 2020-12-11 DIAGNOSIS — Z98.890 OTHER SPECIFIED POSTPROCEDURAL STATES: Chronic | ICD-10-CM

## 2020-12-11 DIAGNOSIS — K64.9 UNSPECIFIED HEMORRHOIDS: ICD-10-CM

## 2020-12-11 DIAGNOSIS — Z98.52 VASECTOMY STATUS: Chronic | ICD-10-CM

## 2020-12-11 LAB
BLD GP AB SCN SERPL QL: NEGATIVE — SIGNIFICANT CHANGE UP
HCT VFR BLD CALC: 34.6 % — LOW (ref 39–50)
HGB BLD-MCNC: 10.2 G/DL — LOW (ref 13–17)
MCHC RBC-ENTMCNC: 27.2 PG — SIGNIFICANT CHANGE UP (ref 27–34)
MCHC RBC-ENTMCNC: 29.5 GM/DL — LOW (ref 32–36)
MCV RBC AUTO: 92.3 FL — SIGNIFICANT CHANGE UP (ref 80–100)
NRBC # BLD: 3 /100 WBCS — HIGH (ref 0–0)
PLATELET # BLD AUTO: 307 K/UL — SIGNIFICANT CHANGE UP (ref 150–400)
RBC # BLD: 3.75 M/UL — LOW (ref 4.2–5.8)
RBC # FLD: 19.6 % — HIGH (ref 10.3–14.5)
RH IG SCN BLD-IMP: POSITIVE — SIGNIFICANT CHANGE UP
WBC # BLD: 20.02 K/UL — HIGH (ref 3.8–10.5)
WBC # FLD AUTO: 20.02 K/UL — HIGH (ref 3.8–10.5)

## 2020-12-11 PROCEDURE — 85027 COMPLETE CBC AUTOMATED: CPT

## 2020-12-11 PROCEDURE — 74177 CT ABD & PELVIS W/CONTRAST: CPT | Mod: 26

## 2020-12-11 PROCEDURE — 86901 BLOOD TYPING SEROLOGIC RH(D): CPT

## 2020-12-11 PROCEDURE — 86900 BLOOD TYPING SEROLOGIC ABO: CPT

## 2020-12-11 PROCEDURE — 86850 RBC ANTIBODY SCREEN: CPT

## 2020-12-11 PROCEDURE — G0463: CPT

## 2020-12-11 PROCEDURE — 74177 CT ABD & PELVIS W/CONTRAST: CPT

## 2020-12-11 RX ORDER — CEFOTETAN DISODIUM 1 G
2 VIAL (EA) INJECTION ONCE
Refills: 0 | Status: DISCONTINUED | OUTPATIENT
Start: 2020-12-15 | End: 2020-12-29

## 2020-12-11 NOTE — H&P PST ADULT - ABILITY TO HEAR (WITH HEARING AID OR HEARING APPLIANCE IF NORMALLY USED):
Mildly to Moderately Impaired: difficulty hearing in some environments or speaker may need to increase volume or speak distinctly/Bilateral hearing aides

## 2020-12-11 NOTE — H&P PST ADULT - LAST ECHOCARDIOGRAM
Nov 2020- Moderate to severe mitral regurgitation, severely dilated left atrium, normal LV systolic function EF 60- 65 Nov 2020- Moderate to severe mitral regurgitation, severely dilated left atrium, normal LV systolic function EF 60- 65, mild phtn

## 2020-12-11 NOTE — H&P PST ADULT - NSICDXPASTMEDICALHX_GEN_ALL_CORE_FT
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

## 2020-12-11 NOTE — H&P PST ADULT - HISTORY OF PRESENT ILLNESS
This is 70 year old male with past medical history  of HTN, HLD, luana      Report having rectal pain during defaction  x 1 month, noted occassional slight rectal bleeding    Pt scheduled fro rectal examination under anesthesia, excsion of perinanal mass, possible biopsy, possible botox injection, possible hemorrhoidectimy This is 70 year old male with past medical history  of HTN, HLD, gout, myprolofivatve disease- myelofibrosis with monocytosis.  Completed cycle 11/10-11/14  at [facility]      Currently on antibiotic therapy for right 5th finger paronychia.     Pt reports about one month ago started having rectal pain, worsening with defecation and noted occasional slight rectal bleeding. Pt is now presenting to [facility] scheduled for  rectal examination under anesthesia, excision of perianal mass, possible biopsy, possible botox injection, possible hemorrhoidectomy on 12/15/20.   Of note, pt with platelet dysfunction- plan to administer 1 single unit of platelets intraoperatively and may continue with aspirin regimen as per Dr. Rios.     This is 70 year old male with past medical history  of HTN, HLD, CAD, mitral regurgitation gout, myprolofivatve disease- myelofibrosis with monocytosis.  Completed cycle 11/10-11/14  at [facility]. Currently on antibiotic therapy for right 5th finger paronychia.   Pt reports about one month ago started having rectal pain, worsening with defecation and noted occasional slight rectal bleeding. Pt is now presenting to [facility] scheduled for  rectal examination under anesthesia, excision of perianal mass, possible biopsy, possible botox injection, possible hemorrhoidectomy on 12/15/20.     Of note, pt with platelet dysfunction- plan to administer 1 single unit of platelets intraoperatively and may continue with aspirin regimen as per Dr. Rios.    ** covid test on 12/12 @ [facility]      This is 70 year old male with past medical history  of HTN, HLD, CAD, mitral regurgitation gout, myeloproliferative disease- myelofibrosis with monocytosis.  Completed cycle 11/10-11/14  at [facility] in preparation for stem cell tranplant. Currently on antibiotic therapy for right 5th finger paronychia. Pt reports about one month ago started having rectal pain, worsening with defecation and noted occasional slight rectal bleeding. Pt currently denies any lightheadedness, dizziness, CP, palpitations or SOB at this time.    Pt is now presenting to [facility] scheduled for  rectal examination under anesthesia, excision of perianal mass, possible biopsy, possible botox injection, possible hemorrhoidectomy on 12/15/20.   Of note, pt with platelet dysfunction- plan to administer 1 single unit of platelets intraoperatively and may continue with aspirin regimen as per Dr. Riso.    ** Covid test on 12/12 @ [facility]

## 2020-12-11 NOTE — H&P PST ADULT - NSICDXPROBLEM_GEN_ALL_CORE_FT
PROBLEM DIAGNOSES  Problem: Hemorrhoids  Assessment and Plan: Scheduled for rectal examinatin under anesthesia, excision of perinanal mass, botox injectionsa dn possible hemorrhoidectomy on 12/15 with Dr. Rios  Preop instructions provided   May continue with aspirin reigmen as per Surgeon   Labs performed    Problem: HTN (hypertension)  Assessment and Plan: Pt instructed to take BP meds with sip of water on DOS       PROBLEM DIAGNOSES  Problem: Hemorrhoids  Assessment and Plan: Scheduled for rectal examinatin under anesthesia, excision of perinanal mass, botox injectionsa dn possible hemorrhoidectomy on 12/15 with Dr. Rios  Preop instructions provided   May continue with aspirin reigmen as per Surgeon   Labs performed    Problem: HTN (hypertension)  Assessment and Plan: Pt instructed to take BP meds with sip of water on DOS.  Cardiac CT scan placed on chart- will obtain recent cardiac note

## 2020-12-11 NOTE — H&P PST ADULT - NSICDXPASTSURGICALHX_GEN_ALL_CORE_FT
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

## 2020-12-11 NOTE — H&P PST ADULT - NSANTHOSAYNRD_GEN_A_CORE
neck size 15.5/No. JACQULEYN screening performed.  STOP BANG Legend: 0-2 = LOW Risk; 3-4 = INTERMEDIATE Risk; 5-8 = HIGH Risk

## 2020-12-12 ENCOUNTER — OUTPATIENT (OUTPATIENT)
Dept: OUTPATIENT SERVICES | Facility: HOSPITAL | Age: 70
LOS: 1 days | End: 2020-12-12
Payer: MEDICARE

## 2020-12-12 ENCOUNTER — APPOINTMENT (OUTPATIENT)
Dept: INFUSION THERAPY | Facility: HOSPITAL | Age: 70
End: 2020-12-12

## 2020-12-12 DIAGNOSIS — Z98.52 VASECTOMY STATUS: Chronic | ICD-10-CM

## 2020-12-12 DIAGNOSIS — Z98.890 OTHER SPECIFIED POSTPROCEDURAL STATES: Chronic | ICD-10-CM

## 2020-12-12 DIAGNOSIS — Z11.59 ENCOUNTER FOR SCREENING FOR OTHER VIRAL DISEASES: ICD-10-CM

## 2020-12-12 PROCEDURE — U0003: CPT

## 2020-12-13 LAB — SARS-COV-2 RNA SPEC QL NAA+PROBE: SIGNIFICANT CHANGE UP

## 2020-12-14 ENCOUNTER — TRANSCRIPTION ENCOUNTER (OUTPATIENT)
Age: 70
End: 2020-12-14

## 2020-12-14 ENCOUNTER — NON-APPOINTMENT (OUTPATIENT)
Age: 70
End: 2020-12-14

## 2020-12-15 ENCOUNTER — TRANSCRIPTION ENCOUNTER (OUTPATIENT)
Age: 70
End: 2020-12-15

## 2020-12-15 ENCOUNTER — OUTPATIENT (OUTPATIENT)
Dept: OUTPATIENT SERVICES | Facility: HOSPITAL | Age: 70
LOS: 1 days | Discharge: HOME | End: 2020-12-15
Payer: MEDICARE

## 2020-12-15 ENCOUNTER — RESULT REVIEW (OUTPATIENT)
Age: 70
End: 2020-12-15

## 2020-12-15 ENCOUNTER — APPOINTMENT (OUTPATIENT)
Dept: SURGERY | Facility: HOSPITAL | Age: 70
End: 2020-12-15
Payer: MEDICARE

## 2020-12-15 VITALS
SYSTOLIC BLOOD PRESSURE: 127 MMHG | TEMPERATURE: 98 F | OXYGEN SATURATION: 99 % | WEIGHT: 175.05 LBS | HEART RATE: 88 BPM | HEIGHT: 69 IN | DIASTOLIC BLOOD PRESSURE: 76 MMHG | RESPIRATION RATE: 16 BRPM

## 2020-12-15 VITALS
RESPIRATION RATE: 16 BRPM | SYSTOLIC BLOOD PRESSURE: 120 MMHG | HEART RATE: 78 BPM | OXYGEN SATURATION: 99 % | DIASTOLIC BLOOD PRESSURE: 70 MMHG

## 2020-12-15 DIAGNOSIS — Z98.52 VASECTOMY STATUS: Chronic | ICD-10-CM

## 2020-12-15 DIAGNOSIS — K62.89 OTHER SPECIFIED DISEASES OF ANUS AND RECTUM: ICD-10-CM

## 2020-12-15 DIAGNOSIS — Z98.890 OTHER SPECIFIED POSTPROCEDURAL STATES: Chronic | ICD-10-CM

## 2020-12-15 PROCEDURE — 88341 IMHCHEM/IMCYTCHM EA ADD ANTB: CPT

## 2020-12-15 PROCEDURE — 88341 IMHCHEM/IMCYTCHM EA ADD ANTB: CPT | Mod: 26

## 2020-12-15 PROCEDURE — 88331 PATH CONSLTJ SURG 1 BLK 1SPC: CPT

## 2020-12-15 PROCEDURE — 88307 TISSUE EXAM BY PATHOLOGIST: CPT

## 2020-12-15 PROCEDURE — 46922 EXCISION OF ANAL LESION(S): CPT

## 2020-12-15 PROCEDURE — 45171 EXC RECT TUM TRANSANAL PART: CPT

## 2020-12-15 PROCEDURE — 88342 IMHCHEM/IMCYTCHM 1ST ANTB: CPT | Mod: 26

## 2020-12-15 PROCEDURE — P9037: CPT

## 2020-12-15 PROCEDURE — 88305 TISSUE EXAM BY PATHOLOGIST: CPT | Mod: 26

## 2020-12-15 PROCEDURE — C1889: CPT

## 2020-12-15 PROCEDURE — 88331 PATH CONSLTJ SURG 1 BLK 1SPC: CPT | Mod: 26

## 2020-12-15 PROCEDURE — 88342 IMHCHEM/IMCYTCHM 1ST ANTB: CPT

## 2020-12-15 PROCEDURE — 46606 ANOSCOPY AND BIOPSY: CPT

## 2020-12-15 PROCEDURE — 88307 TISSUE EXAM BY PATHOLOGIST: CPT | Mod: 26

## 2020-12-15 PROCEDURE — 88305 TISSUE EXAM BY PATHOLOGIST: CPT

## 2020-12-15 PROCEDURE — 45990 SURG DX EXAM ANORECTAL: CPT | Mod: 59

## 2020-12-15 RX ORDER — HYDROMORPHONE HYDROCHLORIDE 2 MG/ML
0.5 INJECTION INTRAMUSCULAR; INTRAVENOUS; SUBCUTANEOUS
Refills: 0 | Status: DISCONTINUED | OUTPATIENT
Start: 2020-12-15 | End: 2020-12-15

## 2020-12-15 RX ORDER — PSYLLIUM SEED (WITH DEXTROSE)
5 POWDER (GRAM) ORAL
Qty: 140 | Refills: 0
Start: 2020-12-15 | End: 2020-12-28

## 2020-12-15 RX ORDER — SODIUM CHLORIDE 9 MG/ML
1000 INJECTION, SOLUTION INTRAVENOUS
Refills: 0 | Status: DISCONTINUED | OUTPATIENT
Start: 2020-12-15 | End: 2020-12-29

## 2020-12-15 RX ORDER — SODIUM CHLORIDE 9 MG/ML
3 INJECTION INTRAMUSCULAR; INTRAVENOUS; SUBCUTANEOUS EVERY 8 HOURS
Refills: 0 | Status: DISCONTINUED | OUTPATIENT
Start: 2020-12-15 | End: 2020-12-15

## 2020-12-15 RX ORDER — METOCLOPRAMIDE HCL 10 MG
10 TABLET ORAL ONCE
Refills: 0 | Status: DISCONTINUED | OUTPATIENT
Start: 2020-12-15 | End: 2020-12-15

## 2020-12-15 RX ORDER — HYDROMORPHONE HYDROCHLORIDE 2 MG/ML
0.25 INJECTION INTRAMUSCULAR; INTRAVENOUS; SUBCUTANEOUS
Refills: 0 | Status: DISCONTINUED | OUTPATIENT
Start: 2020-12-15 | End: 2020-12-15

## 2020-12-15 RX ORDER — LIDOCAINE HCL 20 MG/ML
0.2 VIAL (ML) INJECTION ONCE
Refills: 0 | Status: COMPLETED | OUTPATIENT
Start: 2020-12-15 | End: 2020-12-15

## 2020-12-15 RX ORDER — OXYCODONE HYDROCHLORIDE 5 MG/1
1 TABLET ORAL
Qty: 20 | Refills: 0
Start: 2020-12-15 | End: 2020-12-21

## 2020-12-15 RX ADMIN — SODIUM CHLORIDE 3 MILLILITER(S): 9 INJECTION INTRAMUSCULAR; INTRAVENOUS; SUBCUTANEOUS at 10:20

## 2020-12-15 RX ADMIN — SODIUM CHLORIDE 75 MILLILITER(S): 9 INJECTION, SOLUTION INTRAVENOUS at 18:04

## 2020-12-15 RX ADMIN — Medication 0.2 MILLILITER(S): at 10:20

## 2020-12-15 NOTE — ASU DISCHARGE PLAN (ADULT/PEDIATRIC) - MEDICATION INSTRUCTIONS
Take Tylenol up to 975mg every 6 hours. Use Oxycodone only for severe pain not controlled with the Tylenol.

## 2020-12-15 NOTE — PRE-ANESTHESIA EVALUATION ADULT - LAST ECHOCARDIOGRAM
Nov 2020- Moderate to severe mitral regurgitation, severely dilated left atrium, normal LV systolic function EF 60- 65, mild phtn

## 2020-12-15 NOTE — DISCHARGE NOTE NURSING/CASE MANAGEMENT/SOCIAL WORK - PATIENT PORTAL LINK FT
You can access the FollowMyHealth Patient Portal offered by [facility] by registering at the following website: [email or web address]. By joining [employer]’s FollowMyHealth portal, you will also be able to view your health information using other applications (apps) compatible with our system.

## 2020-12-15 NOTE — ASU DISCHARGE PLAN (ADULT/PEDIATRIC) - ASU DC SPECIAL INSTRUCTIONSFT
Take Augmentin 2 times per day for 7 days as prescribed  Take Metamucil and Miralax 2 times per day until your followup appointment, at which time the need for these supplemental medications will be reassessed

## 2020-12-15 NOTE — CHART NOTE - NSCHARTNOTEFT_GEN_A_CORE
POST-OPERATIVE NOTE    Patient is s/p EUA with left perianal mass biopsy and excision.    Subjective:  Patient denies pain at the incisional site, controlled with medication  Denies chest pain, shortness of breath, nausea, vomiting  Is not yet passing gas or having bowel movements  Urinating independently and ambulating independently    Vital Signs Last 24 Hrs  T(C): 36.5 (15 Dec 2020 19:00), Max: 36.7 (15 Dec 2020 10:11)  T(F): 97.7 (15 Dec 2020 19:00), Max: 98.1 (15 Dec 2020 10:11)  HR: 97 (15 Dec 2020 19:00) (69 - 97)  BP: 118/68 (15 Dec 2020 19:00) (102/58 - 127/76)  BP(mean): 87 (15 Dec 2020 19:00) (74 - 87)  RR: 16 (15 Dec 2020 19:00) (16 - 17)  SpO2: 100% (15 Dec 2020 19:00) (95% - 100%)      I&O's Detail    15 Dec 2020 07:01  -  15 Dec 2020 19:50  --------------------------------------------------------  IN:    Lactated Ringers: 225 mL    Oral Fluid: 400 mL  Total IN: 625 mL    OUT:    Voided (mL): 400 mL  Total OUT: 400 mL    Total NET: 225 mL        cefoTEtan  IVPB 2  cefoTEtan  IVPB 2    PAST MEDICAL & SURGICAL HISTORY:  Myeloproliferative disease    Back pain    Pain Disorder  severe back pain lumbar with history of lumbar epidural steroid injections    Hearing loss  right ear hearing los second to acoustic neuroma surgery with some decreased hearing in left ear also    Herniated disc  L2-3 withlower bulge    Spinal stenosis    Anemia  on slow iron    Cholesterol serum increased    GERD (gastroesophageal reflux disease)    Hypertension    Rectal bleed  bleeding hemmoroids    History of vasectomy  2001    S/P lumbar discectomy  L2-3, June 2012    Esophagus disorder  removal of cartilage ring aroud esophagus 2/03    Vasectomy status  S/P 12/00    Knee gives way  left knee arthroscopy 11/02    Knee gives way  right knee arthroscopy 1/26/01    Spinal fluid abnorm  CSF leak second to repair of acoustic neuroma had repair right Labyrinthectomy 7/17/98    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          Physical Exam:  General: NAD, resting comfortably in bed  Pulmonary: Nonlabored breathing, no respiratory distress, CTAB  Cardiovascular: NSR, no murmurs or rubs  Abdominal: soft, appropriately tender, nondistended.   : Perianal area dressed with gauze and tape, no pain on palpation or sitting  Extremities: WWP      Radiology and Additional Studies:    Assessment:  The patient is a 70y Male who is now 4 hours post-op from an EUA with left perianal mass biopsy and excision.    Plan:  - Pain control as needed, continue prescribed medications  - tolerating regular diet  - F/u as outpatient  - DC to home

## 2020-12-15 NOTE — ASU DISCHARGE PLAN (ADULT/PEDIATRIC) - CARE PROVIDER_API CALL
Nayeli Rios  COLON/RECTAL SURGERY  [address] [place] [address]  Phone: [phone]  Fax: [phone]  Follow Up Time: 1 week

## 2020-12-15 NOTE — BRIEF OPERATIVE NOTE - OPERATION/FINDINGS
Exam under anesthesia, left perianal mass biopsied including specimens from distal anal canal at the anal verge, all samples negative for malignancy on frozen section. Excision of perianal mass performed.

## 2020-12-15 NOTE — PRE-ANESTHESIA EVALUATION ADULT - NSANTHOSAYNRD_GEN_A_CORE
neck size 15.5/No. JACQUELYN screening performed.  STOP BANG Legend: 0-2 = LOW Risk; 3-4 = INTERMEDIATE Risk; 5-8 = HIGH Risk

## 2020-12-15 NOTE — ASU DISCHARGE PLAN (ADULT/PEDIATRIC) - CALL YOUR DOCTOR IF YOU HAVE ANY OF THE FOLLOWING:
Inability to tolerate liquids or foods/Pain not relieved by Medications/Numbness, tingling, color or temperature change to extremity/Excessive diarrhea/Fever greater than (need to indicate Fahrenheit or Celsius)/Increased irritability or sluggishness/Swelling that gets worse/Nausea and vomiting that does not stop/Wound/Surgical Site with redness, or foul smelling discharge or pus/Unable to urinate/Bleeding that does not stop

## 2020-12-15 NOTE — ASU PREOP CHECKLIST - NS PREOP CHK MONITOR ANESTHESIA CONSENT
Diagnosis: small cell lung cancer    Regimen: Tecentriq/carbo/etoposide  Cycle/Day: Cycle4/Day1    Dr. So is supervising clinician today.    ECOG:   ECOG [09/22/20 0804]   ECOG Performance Status 2       Review and verified Advanced Directives: No    Verified if patient has state DNR bracelet on: No; Full Code    Nursing Assessment:   A focused nursing assessment addressing the toxicity of chemotherapy was performed and the patient reports the following:  Nausea: YES, states had dry heaves last Thursday night, took a anti nausea pill and it went away.  No nausea today.    Vomiting: YES, once had dry heaves last Thursday. None since.   Fever: NO  Chills: NO  Other signs of infection: NO  Bleeding: NO  Mucositis: NO  Diarrhea: NO, \"I'm regular every morning.\"  Constipation: NO  Anorexia: YES, states it's not that good.  States Boost and food does not taste as good but trying to get the boost down and eat frequent small meals.  Soft foods are best she states.   Dysuria: NO  Urinary Bleeding: NO  Cough: YES, waking up at night coughing quite often and has brownish sputum at times.  States she will say something to her pulmonologist on Thursday.    Shortness of Breath: YES, states get SOB, INTERVENTION: 4 liters with activity and 2 liters at rest.   Fatigue/Weakness: YES, states rests when needed and that helps.   Numbness/Tingling: NO  Other Neuropathies: NO  Edema: YES, states bilateral ankles/feet with slight edema, states Dr. So aware.   Rash: NO  Hand/Foot Syndrome: NO  Anxiety/Depression/Insomnia: NO, states sleeping good and she states she is handling all this okay, \"my anxiety is better.\"  Pain: NO    Patient confirms that she has received a copy of Chemotherapy Consent and verbalizes understanding of treatment plan: Yes.     Pre-Treatment: - Treatment consent signed  - Patient has valid pre-authorization  - VS completed  - Height and weight verified  BSA independently double checked & verified by two  practitioners  - Premed orders, including hydration, are verified prior to administration  - Treatment parameters verified in patient protocol  - Lab results checked - MD notified; NA  - Chemotherapy doses independently doubled checked & verified by two practitioners  - Patient is identified by first & last name, Date of birth that has been verified with the patient chairside.  - Patient is identified by first & last name, Date of birth that has been verified by two practitioners with the patient chairside.    Treatment: Refer to LDA and MAR for line assessment and medication administration  Refer to Toxicity Assessment doc flowsheet   Chemotherapy has not ; double checked & verified by two practitioners  Appearance and physical integrity of drugs meets standard of drug monograph; double checked & verified by two practitioners  Rate set on infusion pump is in alignment with ordered rate; double checked & verified by two practitioners  Blood return confirmed before, during and after treatment administered  Infusion pump used for non-vesicant drugs    Post Treatment: Treatment tolerated well; no adverse reaction    Does the Patient have a central line?  yes:   Device: Port  Central Line Site: Right  and Chest  Central Line Site Appearance: WDL  Implanted port accessed using a 20 gauge non-coring needle primed with 0.9% sodium chloride. Good blood return obtained.  Blood obtained and sent to lab.  Site Care: Aseptic site care per protocol, Occlusive Transparent Dressing, Sterile gauze and tape dressing applied, Injection caps changed/applied and Biopatch  Central line flushed with: 20 ml 0.9 normal saline and 100 un/ml- 500 units heparin  Central line removed: no  Silva Needle: Silva needle de-accessed      Transfusion: Not needed    Integrative Medicine: No    Oral Chemotherapy: No    Education: No new instructions needed    Next appointment scheduled: 20  Patient instructed to call the office with any  questions or concerns.    Patient Discharged: patient discharged to home per self, ambulatory, with family member, to home    Pain \"0\"    (0-10 scale)    Fall risk 35  (Cabrera Fall Risk)    ECOG 2  (Performance Status)   0 - Fully active, able to carry on all predisease activites without restrictions.1 - No physically strenuous activity, but ambulatory and able to carry out light or sedentary work.2 - Ambulatory/capable of self-care, unable to perform work activities. Up & about more than 50% of the day.3 - Capable of only limited self-care, confined to bed or chair more than 50% of waking hours.4 - Completely disabled, totally confined to bed or chair. Cannot carry on any self-care.    Pysch/Social no new issues addressed.     Abuse/Neglect - No abuse/neglect concerns identified   done

## 2020-12-16 ENCOUNTER — NON-APPOINTMENT (OUTPATIENT)
Age: 70
End: 2020-12-16

## 2020-12-16 PROBLEM — D47.1 CHRONIC MYELOPROLIFERATIVE DISEASE: Chronic | Status: ACTIVE | Noted: 2020-12-11

## 2020-12-22 ENCOUNTER — NON-APPOINTMENT (OUTPATIENT)
Age: 70
End: 2020-12-22

## 2020-12-28 ENCOUNTER — OUTPATIENT (OUTPATIENT)
Dept: OUTPATIENT SERVICES | Facility: HOSPITAL | Age: 70
LOS: 1 days | Discharge: HOME | End: 2020-12-28

## 2020-12-28 DIAGNOSIS — Z98.52 VASECTOMY STATUS: Chronic | ICD-10-CM

## 2020-12-28 DIAGNOSIS — Z98.890 OTHER SPECIFIED POSTPROCEDURAL STATES: Chronic | ICD-10-CM

## 2020-12-28 DIAGNOSIS — D47.3 ESSENTIAL (HEMORRHAGIC) THROMBOCYTHEMIA: ICD-10-CM

## 2020-12-29 ENCOUNTER — RESULT REVIEW (OUTPATIENT)
Age: 70
End: 2020-12-29

## 2020-12-29 ENCOUNTER — APPOINTMENT (OUTPATIENT)
Dept: HEMATOLOGY ONCOLOGY | Facility: CLINIC | Age: 70
End: 2020-12-29
Payer: MEDICARE

## 2020-12-29 VITALS
BODY MASS INDEX: 25.23 KG/M2 | WEIGHT: 168.43 LBS | SYSTOLIC BLOOD PRESSURE: 121 MMHG | OXYGEN SATURATION: 98 % | HEART RATE: 75 BPM | DIASTOLIC BLOOD PRESSURE: 77 MMHG | RESPIRATION RATE: 16 BRPM | HEIGHT: 68.35 IN | TEMPERATURE: 97.9 F

## 2020-12-29 DIAGNOSIS — R25.2 CRAMP AND SPASM: ICD-10-CM

## 2020-12-29 DIAGNOSIS — K62.89 OTHER SPECIFIED DISEASES OF ANUS AND RECTUM: ICD-10-CM

## 2020-12-29 DIAGNOSIS — R10.9 UNSPECIFIED ABDOMINAL PAIN: ICD-10-CM

## 2020-12-29 LAB
ANISOCYTOSIS BLD QL: SLIGHT — SIGNIFICANT CHANGE UP
BASOPHILS # BLD AUTO: 0 K/UL — SIGNIFICANT CHANGE UP (ref 0–0.2)
BASOPHILS NFR BLD AUTO: 0 % — SIGNIFICANT CHANGE UP (ref 0–2)
BLASTS # FLD: 3 % — HIGH (ref 0–0)
DACRYOCYTES BLD QL SMEAR: SLIGHT — SIGNIFICANT CHANGE UP
ELLIPTOCYTES BLD QL SMEAR: SLIGHT — SIGNIFICANT CHANGE UP
EOSINOPHIL # BLD AUTO: 0 K/UL — SIGNIFICANT CHANGE UP (ref 0–0.5)
EOSINOPHIL NFR BLD AUTO: 0 % — SIGNIFICANT CHANGE UP (ref 0–6)
HCT VFR BLD CALC: 33.9 % — LOW (ref 39–50)
HGB BLD-MCNC: 10.4 G/DL — LOW (ref 13–17)
HYPOCHROMIA BLD QL: SLIGHT — SIGNIFICANT CHANGE UP
LYMPHOCYTES # BLD AUTO: 26 % — SIGNIFICANT CHANGE UP (ref 13–44)
LYMPHOCYTES # BLD AUTO: 6.16 K/UL — HIGH (ref 1–3.3)
MCHC RBC-ENTMCNC: 28.2 PG — SIGNIFICANT CHANGE UP (ref 27–34)
MCHC RBC-ENTMCNC: 30.7 G/DL — LOW (ref 32–36)
MCV RBC AUTO: 91.9 FL — SIGNIFICANT CHANGE UP (ref 80–100)
METAMYELOCYTES # FLD: 4 % — HIGH (ref 0–0)
MONOCYTES # BLD AUTO: 2.13 K/UL — HIGH (ref 0–0.9)
MONOCYTES NFR BLD AUTO: 9 % — SIGNIFICANT CHANGE UP (ref 2–14)
MYELOCYTES NFR BLD: 10 % — HIGH (ref 0–0)
NEUTROPHILS # BLD AUTO: 11.14 K/UL — HIGH (ref 1.8–7.4)
NEUTROPHILS NFR BLD AUTO: 41 % — LOW (ref 43–77)
NEUTS BAND # BLD: 6 % — SIGNIFICANT CHANGE UP (ref 0–8)
NRBC # BLD: 5 /100 — HIGH (ref 0–0)
NRBC # BLD: SIGNIFICANT CHANGE UP /100 WBCS (ref 0–0)
PLAT MORPH BLD: NORMAL — SIGNIFICANT CHANGE UP
PLATELET # BLD AUTO: 438 K/UL — HIGH (ref 150–400)
POIKILOCYTOSIS BLD QL AUTO: SLIGHT — SIGNIFICANT CHANGE UP
POLYCHROMASIA BLD QL SMEAR: SLIGHT — SIGNIFICANT CHANGE UP
PROMYELOCYTES # FLD: 1 % — HIGH (ref 0–0)
RBC # BLD: 3.69 M/UL — LOW (ref 4.2–5.8)
RBC # FLD: 19.1 % — HIGH (ref 10.3–14.5)
RBC BLD AUTO: ABNORMAL
SCHISTOCYTES BLD QL AUTO: SLIGHT — SIGNIFICANT CHANGE UP
WBC # BLD: 23.71 K/UL — HIGH (ref 3.8–10.5)
WBC # FLD AUTO: 23.71 K/UL — HIGH (ref 3.8–10.5)

## 2020-12-29 PROCEDURE — 99214 OFFICE O/P EST MOD 30 MIN: CPT

## 2020-12-29 RX ORDER — HYDROCORTISONE ACETATE, PRAMOXINE HCL 2.5; 1 G/100G; G/100G
2.5-1 CREAM TOPICAL
Qty: 30 | Refills: 1 | Status: DISCONTINUED | COMMUNITY
Start: 2020-07-16 | End: 2020-12-29

## 2020-12-29 RX ORDER — WHEAT DEXTRIN 3 G/4 G
POWDER (GRAM) ORAL
Refills: 0 | Status: DISCONTINUED | COMMUNITY
End: 2020-12-29

## 2020-12-29 RX ORDER — HYDROCORTISONE ACETATE 25 MG/1
25 SUPPOSITORY RECTAL
Qty: 5 | Refills: 0 | Status: DISCONTINUED | COMMUNITY
Start: 2020-11-24 | End: 2020-12-29

## 2020-12-29 RX ORDER — MULTIVITAMIN
TABLET ORAL
Refills: 0 | Status: DISCONTINUED | COMMUNITY
End: 2020-12-29

## 2020-12-29 RX ORDER — COLCHICINE 0.6 MG/1
0.6 TABLET ORAL
Refills: 0 | Status: DISCONTINUED | COMMUNITY
End: 2020-12-29

## 2020-12-29 RX ORDER — BETAMETHASONE DIPROPIONATE 0.5 MG/G
0.05 OINTMENT TOPICAL DAILY
Qty: 1 | Refills: 0 | Status: DISCONTINUED | COMMUNITY
Start: 2020-11-24 | End: 2020-12-29

## 2020-12-29 RX ORDER — BETAMETHASONE DIPROPIONATE 0.5 MG/G
0.05 OINTMENT, AUGMENTED TOPICAL
Qty: 50 | Refills: 0 | Status: COMPLETED | COMMUNITY
Start: 2020-11-24

## 2020-12-29 RX ORDER — DICLOXACILLIN SODIUM 250 MG/1
250 CAPSULE ORAL 4 TIMES DAILY
Qty: 20 | Refills: 0 | Status: DISCONTINUED | COMMUNITY
Start: 2020-11-24 | End: 2020-12-29

## 2020-12-29 RX ORDER — PRAMOXINE HYDROCHLORIDE 10 MG/G
1 AEROSOL, FOAM TOPICAL
Refills: 0 | Status: DISCONTINUED | COMMUNITY
End: 2020-12-29

## 2020-12-29 RX ORDER — MULTIVIT-MIN/FA/LYCOPEN/LUTEIN .4-300-25
TABLET ORAL DAILY
Refills: 0 | Status: ACTIVE | COMMUNITY

## 2020-12-29 RX ORDER — OXYCODONE 5 MG/1
5 TABLET ORAL
Qty: 20 | Refills: 0 | Status: COMPLETED | COMMUNITY
Start: 2020-12-15

## 2020-12-30 ENCOUNTER — APPOINTMENT (OUTPATIENT)
Dept: SURGERY | Facility: CLINIC | Age: 70
End: 2020-12-30
Payer: MEDICARE

## 2020-12-30 VITALS
OXYGEN SATURATION: 97 % | RESPIRATION RATE: 17 BRPM | TEMPERATURE: 98 F | DIASTOLIC BLOOD PRESSURE: 77 MMHG | HEART RATE: 89 BPM | SYSTOLIC BLOOD PRESSURE: 125 MMHG

## 2020-12-30 PROCEDURE — 99024 POSTOP FOLLOW-UP VISIT: CPT

## 2020-12-30 NOTE — CONSULT LETTER
[Dear  ___] : Dear  [unfilled], [Courtesy Letter:] : I had the pleasure of seeing your patient, [unfilled], in my office today. [Please see my note below.] : Please see my note below. [Sincerely,] : Sincerely, [FreeTextEntry2] : Dr Noam Coker [FreeTextEntry3] : Nayeli Rios M.D., F.A.C.S., F.JORGE.S.C.R.S.\par Assistant Professor of Surgery\par [employer] at [facility]\par \par  [DrGuille  ___] : Dr. MONTES [DrGuille ___] : Dr. MONTES

## 2020-12-30 NOTE — ASSESSMENT
[FreeTextEntry1] : Appropriate recovery so far.\par Path pending.  Frozen biopsies were negative for malignancy.\par He has a relatively small flare-up of right-sided hemorrhoids which should resolve within the next few weeks.\par Try to wean off Miralax as tolerated.  Continue fiber supplements.\par I will call the patient with the path results.\par I expect that he will need at least 4 more weeks without chemotherapy if possible to allow for healing of the large perianal wound.\par RTO in 3 weeks.\par \par

## 2020-12-30 NOTE — PHYSICAL EXAM
[FreeTextEntry1] : This is a 70 year-old well-developed male in no apparent distress.\par Examination of the perineum reveals a superficial, large left-sided open wound. The wound is clean and appears healthy.  \par There is an edematous right posterolateral prolapsing hemorrhoid - new since the surgery.

## 2020-12-30 NOTE — HISTORY OF PRESENT ILLNESS
[FreeTextEntry1] : Today the patient reports the postoperative pain has been improving. Not taking any narcotics.  Occasionally takes an ibuprofen for his chronic pain.. Endorses being "aware" of when he has to have a BM. Reports occasionally seeing stool in adults depends. Unsure if he is incontinent to gas and stool.  Has 2-7 formed, soft BMs a day. BMs are substantial, formed and soft. Takes Metamucil once a day and Miralax BID. Denies rectal bleeding.  He feels there is still a perianal lump.

## 2021-01-04 LAB — SURGICAL PATHOLOGY STUDY: SIGNIFICANT CHANGE UP

## 2021-01-11 ENCOUNTER — APPOINTMENT (OUTPATIENT)
Dept: SURGERY | Facility: CLINIC | Age: 71
End: 2021-01-11
Payer: MEDICARE

## 2021-01-11 VITALS
HEART RATE: 82 BPM | DIASTOLIC BLOOD PRESSURE: 82 MMHG | OXYGEN SATURATION: 98 % | SYSTOLIC BLOOD PRESSURE: 151 MMHG | TEMPERATURE: 97 F | RESPIRATION RATE: 16 BRPM

## 2021-01-11 DIAGNOSIS — K62.89 OTHER SPECIFIED DISEASES OF ANUS AND RECTUM: ICD-10-CM

## 2021-01-11 PROCEDURE — 99213 OFFICE O/P EST LOW 20 MIN: CPT

## 2021-01-12 PROBLEM — K62.89 PERIANAL MASS: Status: RESOLVED | Noted: 2020-12-08 | Resolved: 2021-01-12

## 2021-01-12 NOTE — ASSESSMENT
[FreeTextEntry1] : Doing well postop.\par Path had been d/w him over the phone. No malignancy. Some stains still pending. \par Try to wean off Miralax as tolerated.  Continue fiber supplements.\par I would like to have 3 more weeks without chemotherapy - if possible- to allow for further healing of the large perianal wound.\par RTO in 3 weeks.\par \par

## 2021-01-12 NOTE — PHYSICAL EXAM
[FreeTextEntry1] : This is a 70 year-old well-developed male in no apparent distress.\par Examination of the perineum reveals the left-sided perianal open wound is superficial and pink and healing nicely. \par The right posterolateral hemorrhoid that was noted to be edematous in the first postop visit has shrunk significantly.

## 2021-01-12 NOTE — CONSULT LETTER
[Dear  ___] : Dear  [unfilled], [Courtesy Letter:] : I had the pleasure of seeing your patient, [unfilled], in my office today. [Sincerely,] : Sincerely, [Please see my note below.] : Please see my note below. [DrGuille  ___] : Dr. MONTES [DrGuille ___] : Dr. MONTES [FreeTextEntry2] : Dr Noam Coker [FreeTextEntry3] : Nayeli Rios M.D., F.A.C.S., F.JORGE.S.C.R.S.\par Assistant Professor of Surgery\par [employer] at [facility]\par \par

## 2021-01-12 NOTE — HISTORY OF PRESENT ILLNESS
[FreeTextEntry1] : Lincoln is a 71 y/o male here for a postop visit.\par Today the patient reports feeling much better. Pain is minimal. Has better control of feces and gas compared to preop and denies episodes of FI. He is currently taking MiraLAX once a day, decrease from BID previously. He continues to take Metamucil daily. He has 3-8 formed BMs daily. States the right sided hemorrhoid that flared up postop has shrunk.

## 2021-01-20 ENCOUNTER — APPOINTMENT (OUTPATIENT)
Dept: CARDIOLOGY | Facility: CLINIC | Age: 71
End: 2021-01-20
Payer: MEDICARE

## 2021-01-20 ENCOUNTER — APPOINTMENT (OUTPATIENT)
Dept: HEMATOLOGY ONCOLOGY | Facility: CLINIC | Age: 71
End: 2021-01-20
Payer: MEDICARE

## 2021-01-20 ENCOUNTER — NON-APPOINTMENT (OUTPATIENT)
Age: 71
End: 2021-01-20

## 2021-01-20 ENCOUNTER — RESULT REVIEW (OUTPATIENT)
Age: 71
End: 2021-01-20

## 2021-01-20 VITALS
HEART RATE: 70 BPM | OXYGEN SATURATION: 98 % | RESPIRATION RATE: 16 BRPM | TEMPERATURE: 97.5 F | DIASTOLIC BLOOD PRESSURE: 76 MMHG | BODY MASS INDEX: 25.7 KG/M2 | HEIGHT: 68.35 IN | WEIGHT: 171.52 LBS | SYSTOLIC BLOOD PRESSURE: 132 MMHG

## 2021-01-20 VITALS
OXYGEN SATURATION: 98 % | RESPIRATION RATE: 17 BRPM | TEMPERATURE: 97.8 F | HEIGHT: 68 IN | BODY MASS INDEX: 25.91 KG/M2 | SYSTOLIC BLOOD PRESSURE: 118 MMHG | DIASTOLIC BLOOD PRESSURE: 70 MMHG | HEART RATE: 77 BPM | WEIGHT: 171 LBS

## 2021-01-20 LAB
BASOPHILS # BLD AUTO: 0 K/UL — SIGNIFICANT CHANGE UP (ref 0–0.2)
BASOPHILS NFR BLD AUTO: 0 % — SIGNIFICANT CHANGE UP (ref 0–2)
BLASTS # FLD: 2 % — HIGH (ref 0–0)
DACRYOCYTES BLD QL SMEAR: SLIGHT — SIGNIFICANT CHANGE UP
ELLIPTOCYTES BLD QL SMEAR: SLIGHT — SIGNIFICANT CHANGE UP
EOSINOPHIL # BLD AUTO: 1.01 K/UL — HIGH (ref 0–0.5)
EOSINOPHIL NFR BLD AUTO: 2 % — SIGNIFICANT CHANGE UP (ref 0–6)
HCT VFR BLD CALC: 32.3 % — LOW (ref 39–50)
HGB BLD-MCNC: 10.2 G/DL — LOW (ref 13–17)
LYMPHOCYTES # BLD AUTO: 10.13 K/UL — HIGH (ref 1–3.3)
LYMPHOCYTES # BLD AUTO: 20 % — SIGNIFICANT CHANGE UP (ref 13–44)
MCHC RBC-ENTMCNC: 28.4 PG — SIGNIFICANT CHANGE UP (ref 27–34)
MCHC RBC-ENTMCNC: 31.6 G/DL — LOW (ref 32–36)
MCV RBC AUTO: 90 FL — SIGNIFICANT CHANGE UP (ref 80–100)
METAMYELOCYTES # FLD: 3 % — HIGH (ref 0–0)
MONOCYTES # BLD AUTO: 4.56 K/UL — HIGH (ref 0–0.9)
MONOCYTES NFR BLD AUTO: 9 % — SIGNIFICANT CHANGE UP (ref 2–14)
MYELOCYTES NFR BLD: 5 % — HIGH (ref 0–0)
NEUTROPHILS # BLD AUTO: 29.88 K/UL — HIGH (ref 1.8–7.4)
NEUTROPHILS NFR BLD AUTO: 59 % — SIGNIFICANT CHANGE UP (ref 43–77)
NRBC # BLD: 5 /100 — HIGH (ref 0–0)
NRBC # BLD: SIGNIFICANT CHANGE UP /100 WBCS (ref 0–0)
PLAT MORPH BLD: NORMAL — SIGNIFICANT CHANGE UP
PLATELET # BLD AUTO: 470 K/UL — HIGH (ref 150–400)
POIKILOCYTOSIS BLD QL AUTO: SLIGHT — SIGNIFICANT CHANGE UP
POLYCHROMASIA BLD QL SMEAR: SLIGHT — SIGNIFICANT CHANGE UP
RBC # BLD: 3.59 M/UL — LOW (ref 4.2–5.8)
RBC # FLD: 18.5 % — HIGH (ref 10.3–14.5)
RBC BLD AUTO: ABNORMAL
WBC # BLD: 50.64 K/UL — CRITICAL HIGH (ref 3.8–10.5)
WBC # FLD AUTO: 50.64 K/UL — CRITICAL HIGH (ref 3.8–10.5)

## 2021-01-20 PROCEDURE — 93040 RHYTHM ECG WITH REPORT: CPT | Mod: 59

## 2021-01-20 PROCEDURE — 99214 OFFICE O/P EST MOD 30 MIN: CPT

## 2021-01-20 PROCEDURE — 93000 ELECTROCARDIOGRAM COMPLETE: CPT

## 2021-01-22 NOTE — PHYSICAL EXAM
[Fully active, able to carry on all pre-disease performance without restriction] : Status 0 - Fully active, able to carry on all pre-disease performance without restriction [Normal] : affect appropriate [de-identified] : No dullness in Traube's space.  [de-identified] : R first finger swelling and redness around nail bed

## 2021-01-22 NOTE — ASSESSMENT
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 The latter is not clinically significant. \par \par Weight loss, worsening anemia, increased blasts in peripheral blood and marrow, increased WBC indicate progression to more aggressive disease that warrants treatment. DIPSS score-6, high risk category. Treatment options explored with pt including Jakafi, alloSCT and clinical trials. Consultation at [facility] with Dr. Patrick Gonzales completed. Began therapy with Dacogen cycle 1. Received dacogen + Jakafi C 2began 11/10/20; pt had symptomatic improvement and resolution of splenomegaly. On 11/19, developed an external hemorrhoid-reports pain to area. Pt had hemorrhoidectomy on 12/15 by Dr Rios; was found to be non-malignant.   \par \par Plan:\par Jakafi twice a day.  \par Allopurinol-pt takes this for gout.\par Waiting for clearance from Dr Huang post hemorrhoidectomy.\par Will plan for another cycle of Dacogen week of 2/8, then SCT at [facility].\par CMP, LDH, lipid profile\par RTC one week.\par  \par

## 2021-01-22 NOTE — HISTORY OF PRESENT ILLNESS
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  Has chronic tenesmus.  Being followed by Dr Altamirano/Pradip at [facility] for SCT, tentatively planned for March; this had been postponed d/t hemorrhoid surgery.   Had hemorrhoidectomy on 12/15 with Dr Rios; site healing well, denies pain to area.  Feeling well with improved energy.  Had a little constipation this morning-is monitoring and adjusting stool softeners as needed.  Started Jakafi 2 tabs daily on 1/6/21.    \par \par \par

## 2021-01-22 NOTE — RESULTS/DATA
[FreeTextEntry1] : WBC 10542 Hgb 10.2 Hct 32.3 Platelets 470,000 59P 20L 9M 2Eos 0 Bas  \par \par Blasts 2%  \par  \par Final Diagnosis\par 1. Anal canal mass, biopsy:\par - Fibroadipose tissue with active inflammation, focal\par abscess formation, necrosis and\par inflamed granulation tissue\par - No epithelium present\par \par 2. Anal canal mass, excisional biopsy:\par - Inflamed granulation tissue\par - No epithelium present\par \par 3. Anal canal mass, excisional biopsy:\par - Squamous mucosa with active inflammation, inflamed\par granulation tissue and dense\par fibrosis\par - Negative for carcinoma\par \par 4. Anal canal mass, excisional biopsy:\par - Squamous tissue with active inflammation, focal abscess\par formation, necrosis and\par inflamed granulation tissue\par - Negative for carcinoma\par \par 5. Anal canal mass, resection:\par - Squamous tissue with active inflammation, focal abscess\par formation, necrosis and\par inflamed granulation tissue\par - Squamous epithelium with reactive changes, Negative for\par carcinoma\par

## 2021-01-22 NOTE — REVIEW OF SYSTEMS
[Abdominal Pain] : abdominal pain [Constipation] : constipation [Joint Pain] : joint pain [Negative] : Integumentary [Skin Wound] : no skin wound [FreeTextEntry7] : intermittent constipation

## 2021-01-25 PROBLEM — R25.2 MUSCLE CRAMPS: Status: ACTIVE | Noted: 2020-06-09

## 2021-01-25 PROBLEM — R10.9 ABDOMINAL PAIN: Status: ACTIVE | Noted: 2020-07-09

## 2021-01-25 PROBLEM — K62.89 RECTAL PAIN: Status: ACTIVE | Noted: 2020-11-24

## 2021-01-27 ENCOUNTER — APPOINTMENT (OUTPATIENT)
Dept: HEMATOLOGY ONCOLOGY | Facility: CLINIC | Age: 71
End: 2021-01-27

## 2021-01-27 ENCOUNTER — APPOINTMENT (OUTPATIENT)
Dept: CARDIOLOGY | Facility: CLINIC | Age: 71
End: 2021-01-27
Payer: MEDICARE

## 2021-01-27 ENCOUNTER — RESULT REVIEW (OUTPATIENT)
Age: 71
End: 2021-01-27

## 2021-01-27 ENCOUNTER — NON-APPOINTMENT (OUTPATIENT)
Age: 71
End: 2021-01-27

## 2021-01-27 ENCOUNTER — APPOINTMENT (OUTPATIENT)
Dept: INFUSION THERAPY | Facility: HOSPITAL | Age: 71
End: 2021-01-27

## 2021-01-27 VITALS
BODY MASS INDEX: 26.76 KG/M2 | TEMPERATURE: 97.8 F | OXYGEN SATURATION: 98 % | SYSTOLIC BLOOD PRESSURE: 116 MMHG | HEART RATE: 72 BPM | DIASTOLIC BLOOD PRESSURE: 70 MMHG | WEIGHT: 176 LBS

## 2021-01-27 LAB
BASOPHILS # BLD AUTO: 0.33 K/UL — HIGH (ref 0–0.2)
BASOPHILS NFR BLD AUTO: 1 % — SIGNIFICANT CHANGE UP (ref 0–2)
BLASTS # FLD: 3 % — HIGH (ref 0–0)
DACRYOCYTES BLD QL SMEAR: SLIGHT — SIGNIFICANT CHANGE UP
ELLIPTOCYTES BLD QL SMEAR: SLIGHT — SIGNIFICANT CHANGE UP
EOSINOPHIL # BLD AUTO: 0 K/UL — SIGNIFICANT CHANGE UP (ref 0–0.5)
EOSINOPHIL NFR BLD AUTO: 0 % — SIGNIFICANT CHANGE UP (ref 0–6)
HCT VFR BLD CALC: 32.9 % — LOW (ref 39–50)
HGB BLD-MCNC: 10.2 G/DL — LOW (ref 13–17)
LYMPHOCYTES # BLD AUTO: 18 % — SIGNIFICANT CHANGE UP (ref 13–44)
LYMPHOCYTES # BLD AUTO: 5.96 K/UL — HIGH (ref 1–3.3)
MCHC RBC-ENTMCNC: 27.8 PG — SIGNIFICANT CHANGE UP (ref 27–34)
MCHC RBC-ENTMCNC: 31 G/DL — LOW (ref 32–36)
MCV RBC AUTO: 89.6 FL — SIGNIFICANT CHANGE UP (ref 80–100)
METAMYELOCYTES # FLD: 3 % — HIGH (ref 0–0)
MONOCYTES # BLD AUTO: 5.63 K/UL — HIGH (ref 0–0.9)
MONOCYTES NFR BLD AUTO: 17 % — HIGH (ref 2–14)
MYELOCYTES NFR BLD: 6 % — HIGH (ref 0–0)
NEUTROPHILS # BLD AUTO: 17.23 K/UL — HIGH (ref 1.8–7.4)
NEUTROPHILS NFR BLD AUTO: 52 % — SIGNIFICANT CHANGE UP (ref 43–77)
NRBC # BLD: 4 /100 — HIGH (ref 0–0)
NRBC # BLD: SIGNIFICANT CHANGE UP /100 WBCS (ref 0–0)
PLAT MORPH BLD: NORMAL — SIGNIFICANT CHANGE UP
PLATELET # BLD AUTO: 247 K/UL — SIGNIFICANT CHANGE UP (ref 150–400)
POIKILOCYTOSIS BLD QL AUTO: SLIGHT — SIGNIFICANT CHANGE UP
POLYCHROMASIA BLD QL SMEAR: SLIGHT — SIGNIFICANT CHANGE UP
RBC # BLD: 3.67 M/UL — LOW (ref 4.2–5.8)
RBC # FLD: 18.7 % — HIGH (ref 10.3–14.5)
RBC BLD AUTO: ABNORMAL
WBC # BLD: 33.13 K/UL — HIGH (ref 3.8–10.5)
WBC # FLD AUTO: 33.13 K/UL — HIGH (ref 3.8–10.5)

## 2021-01-27 PROCEDURE — 93000 ELECTROCARDIOGRAM COMPLETE: CPT

## 2021-01-27 PROCEDURE — 99214 OFFICE O/P EST MOD 30 MIN: CPT

## 2021-01-27 NOTE — ASSESSMENT
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 The latter is not clinically significant. \par \par Weight loss, worsening anemia, increased blasts in peripheral blood and marrow, increased WBC indicate progression to more aggressive disease that warrants treatment. DIPSS score-6, high risk category. Treatment options explored with pt including Jakafi, alloSCT and clinical trials. Consultation at [facility] with Dr. Patrick Gonzales completed. Began therapy with Dacogen cycle 1. Received dacogen + Jakafi cycle 2 which started 11/10/20. Has continued response with symptomatic improvement and resolution of splenomegaly. On 11/19, developed an external hemorrhoid and underwent hemorrhoidectomy/perianal mass excision on 12/15 by Dr Rios.   Pathology report is pending. R 5th finger paronychia improved. \par \par Plan:\par Continue Jakafi once a day.  \par Allopurinol- pt takes this for gout.\par Follow-up with colorectal surgery tomorrow as scheduled\par Has planned alloSCT at [facility]; this is postponed until healing from surgery is complete and pathology is reviewed. Will follow-up with [facility] appt on 1/13/2021\par Continue Reglan 10 mg po q6h prn nausea\par CMP, LDH, lipid profile\par Call next week after pathology report is back.\par Discussed with Dr. Coker. \par  \par

## 2021-01-27 NOTE — HISTORY OF PRESENT ILLNESS
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  Cycle 1 Dacogen only.  Cycle 2 Dacogen + Jakafi 11/10-11/14/2020.  [de-identified] : 12/29/2020 Office Visit:\par Here for follow-up for MPD\par \par Patient underwent perianal mass excision on 12/15/2020.  Pathology results pending.  Patient reports that he has some pain with bowel movements, standing and walking.   He c/o occas LLQ abdominal pain.  He has been having soft formed BM with occas spotting of blood on toilet tissue.  He denies any black stools.   He is not taking any pain meds currently. He has follow-up with colorectal surgeon tomorrow.  He c/o bone pain in the right ulnar, long bones, ribs and shoulder.  He is taking Aleve as needed with some relief in pain.  He reports some fatigue and 4-5 pounds of recent weight loss.   Cycle 2 of Dacogen was 11/10- 11/14/2020. He started Jakafi BID this cycle but was decreased to once daily.  He remains on Jakafi 10mg po daily- tolerating well.   He denies any fevers, chills, night sweats,chest pain, SOB, or dizziness. Nasal congestion/sinusitis has resolved.   Being followed by Dr Altamirano/Pradip at [facility] for SCT in December; this will be postponed until pathology returns on perinanal mass.  R 5th finger paronychia has improved.

## 2021-01-27 NOTE — PHYSICAL EXAM
[Fully active, able to carry on all pre-disease performance without restriction] : Status 0 - Fully active, able to carry on all pre-disease performance without restriction [Normal] : affect appropriate [de-identified] : R first finger swelling and redness around nail bed improved [de-identified] : No cervical, axillary or inguinal LAD noted

## 2021-01-27 NOTE — REVIEW OF SYSTEMS
[Abdominal Pain] : abdominal pain [Constipation] : constipation [Joint Pain] : joint pain [Skin Wound] : skin wound [Negative] : Allergic/Immunologic [Fatigue] : fatigue [Recent Change In Weight] : ~T recent weight change [FreeTextEntry7] : perianal pain  [FreeTextEntry9] : occas back pain  [de-identified] : R first finger paronychia improved.

## 2021-01-27 NOTE — RESULTS/DATA
[FreeTextEntry1] : CBC today\par WBC 23.71\par HGB 10.4\par HCT 33.9\par ,000\par 3% blasts\par 41% neutrophils \par

## 2021-01-30 ENCOUNTER — RESULT CHARGE (OUTPATIENT)
Age: 71
End: 2021-01-30

## 2021-01-31 ENCOUNTER — NON-APPOINTMENT (OUTPATIENT)
Age: 71
End: 2021-01-31

## 2021-02-02 ENCOUNTER — OUTPATIENT (OUTPATIENT)
Dept: OUTPATIENT SERVICES | Facility: HOSPITAL | Age: 71
LOS: 1 days | Discharge: HOME | End: 2021-02-02

## 2021-02-02 DIAGNOSIS — Z98.52 VASECTOMY STATUS: Chronic | ICD-10-CM

## 2021-02-02 DIAGNOSIS — D47.3 ESSENTIAL (HEMORRHAGIC) THROMBOCYTHEMIA: ICD-10-CM

## 2021-02-02 DIAGNOSIS — Z98.890 OTHER SPECIFIED POSTPROCEDURAL STATES: Chronic | ICD-10-CM

## 2021-02-03 ENCOUNTER — RESULT REVIEW (OUTPATIENT)
Age: 71
End: 2021-02-03

## 2021-02-03 ENCOUNTER — APPOINTMENT (OUTPATIENT)
Dept: SURGERY | Facility: CLINIC | Age: 71
End: 2021-02-03
Payer: MEDICARE

## 2021-02-03 ENCOUNTER — APPOINTMENT (OUTPATIENT)
Dept: CARDIOLOGY | Facility: CLINIC | Age: 71
End: 2021-02-03
Payer: MEDICARE

## 2021-02-03 ENCOUNTER — APPOINTMENT (OUTPATIENT)
Dept: HEMATOLOGY ONCOLOGY | Facility: CLINIC | Age: 71
End: 2021-02-03
Payer: MEDICARE

## 2021-02-03 ENCOUNTER — NON-APPOINTMENT (OUTPATIENT)
Age: 71
End: 2021-02-03

## 2021-02-03 VITALS
WEIGHT: 176 LBS | OXYGEN SATURATION: 98 % | TEMPERATURE: 97.9 F | DIASTOLIC BLOOD PRESSURE: 70 MMHG | HEART RATE: 81 BPM | BODY MASS INDEX: 26.76 KG/M2 | SYSTOLIC BLOOD PRESSURE: 122 MMHG

## 2021-02-03 VITALS
HEART RATE: 85 BPM | DIASTOLIC BLOOD PRESSURE: 75 MMHG | OXYGEN SATURATION: 98 % | TEMPERATURE: 97.8 F | WEIGHT: 176 LBS | BODY MASS INDEX: 26.67 KG/M2 | HEIGHT: 68 IN | SYSTOLIC BLOOD PRESSURE: 126 MMHG | RESPIRATION RATE: 18 BRPM

## 2021-02-03 DIAGNOSIS — Z09 ENCOUNTER FOR FOLLOW-UP EXAMINATION AFTER COMPLETED TREATMENT FOR CONDITIONS OTHER THAN MALIGNANT NEOPLASM: ICD-10-CM

## 2021-02-03 LAB
BASOPHILS # BLD AUTO: 1.47 K/UL — HIGH (ref 0–0.2)
BASOPHILS NFR BLD AUTO: 4 % — HIGH (ref 0–2)
BLASTS # FLD: 2 % — HIGH (ref 0–0)
DACRYOCYTES BLD QL SMEAR: SLIGHT — SIGNIFICANT CHANGE UP
ELLIPTOCYTES BLD QL SMEAR: SLIGHT — SIGNIFICANT CHANGE UP
EOSINOPHIL # BLD AUTO: 0 K/UL — SIGNIFICANT CHANGE UP (ref 0–0.5)
EOSINOPHIL NFR BLD AUTO: 0 % — SIGNIFICANT CHANGE UP (ref 0–6)
HCT VFR BLD CALC: 31.8 % — LOW (ref 39–50)
HGB BLD-MCNC: 9.8 G/DL — LOW (ref 13–17)
LYMPHOCYTES # BLD AUTO: 23 % — SIGNIFICANT CHANGE UP (ref 13–44)
LYMPHOCYTES # BLD AUTO: 8.43 K/UL — HIGH (ref 1–3.3)
MCHC RBC-ENTMCNC: 28.4 PG — SIGNIFICANT CHANGE UP (ref 27–34)
MCHC RBC-ENTMCNC: 30.8 G/DL — LOW (ref 32–36)
MCV RBC AUTO: 92.2 FL — SIGNIFICANT CHANGE UP (ref 80–100)
METAMYELOCYTES # FLD: 2 % — HIGH (ref 0–0)
MONOCYTES # BLD AUTO: 2.93 K/UL — HIGH (ref 0–0.9)
MONOCYTES NFR BLD AUTO: 8 % — SIGNIFICANT CHANGE UP (ref 2–14)
MYELOCYTES NFR BLD: 11 % — HIGH (ref 0–0)
NEUTROPHILS # BLD AUTO: 18.33 K/UL — HIGH (ref 1.8–7.4)
NEUTROPHILS NFR BLD AUTO: 50 % — SIGNIFICANT CHANGE UP (ref 43–77)
NRBC # BLD: 8 /100 — HIGH (ref 0–0)
NRBC # BLD: SIGNIFICANT CHANGE UP /100 WBCS (ref 0–0)
PLAT MORPH BLD: NORMAL — SIGNIFICANT CHANGE UP
PLATELET # BLD AUTO: 243 K/UL — SIGNIFICANT CHANGE UP (ref 150–400)
POIKILOCYTOSIS BLD QL AUTO: SLIGHT — SIGNIFICANT CHANGE UP
POLYCHROMASIA BLD QL SMEAR: SLIGHT — SIGNIFICANT CHANGE UP
RBC # BLD: 3.45 M/UL — LOW (ref 4.2–5.8)
RBC # FLD: 18.8 % — HIGH (ref 10.3–14.5)
RBC BLD AUTO: ABNORMAL
WBC # BLD: 36.66 K/UL — HIGH (ref 3.8–10.5)
WBC # FLD AUTO: 36.66 K/UL — HIGH (ref 3.8–10.5)

## 2021-02-03 PROCEDURE — 99214 OFFICE O/P EST MOD 30 MIN: CPT

## 2021-02-03 PROCEDURE — 99024 POSTOP FOLLOW-UP VISIT: CPT

## 2021-02-03 PROCEDURE — 99215 OFFICE O/P EST HI 40 MIN: CPT

## 2021-02-03 PROCEDURE — 93000 ELECTROCARDIOGRAM COMPLETE: CPT

## 2021-02-04 NOTE — RESULTS/DATA
[FreeTextEntry1] : WBC 32583 Hgb 9.8 Hct 31.8 Platelets 243,000 50P 23L 8M 0Eos 4 Bas  \par \par Blasts 2%  \par  \par Final Diagnosis\par 1. Anal canal mass, biopsy:\par - Fibroadipose tissue with active inflammation, focal\par abscess formation, necrosis and\par inflamed granulation tissue\par - No epithelium present\par \par 2. Anal canal mass, excisional biopsy:\par - Inflamed granulation tissue\par - No epithelium present\par \par 3. Anal canal mass, excisional biopsy:\par - Squamous mucosa with active inflammation, inflamed\par granulation tissue and dense\par fibrosis\par - Negative for carcinoma\par \par 4. Anal canal mass, excisional biopsy:\par - Squamous tissue with active inflammation, focal abscess\par formation, necrosis and\par inflamed granulation tissue\par - Negative for carcinoma\par \par 5. Anal canal mass, resection:\par - Squamous tissue with active inflammation, focal abscess\par formation, necrosis and\par inflamed granulation tissue\par - Squamous epithelium with reactive changes, Negative for\par carcinoma\par

## 2021-02-04 NOTE — PHYSICAL EXAM
[Fully active, able to carry on all pre-disease performance without restriction] : Status 0 - Fully active, able to carry on all pre-disease performance without restriction [Normal] : affect appropriate [de-identified] : No dullness in Traube's space.  [de-identified] : R first finger swelling and redness around nail bed

## 2021-02-04 NOTE — ASSESSMENT
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 The latter is not clinically significant. \par \par Weight loss, worsening anemia, increased blasts in peripheral blood and marrow, increased WBC indicate progression to more aggressive disease that warrants treatment. DIPSS score-6, high risk category. Treatment options explored with pt including Jakafi, alloSCT and clinical trials. Consultation at [facility] with Dr. Patrick Gonzales completed.  Received dacogen + Jakafi C 2 began 11/10/20; pt had symptomatic improvement and resolution of splenomegaly. Pt had hemorrhoidectomy on 12/15 by Dr Rios; was found to be non-malignant.  Pt is now cleared by Dr Rios to get chemo.  Saw Dr Colin (cardiology) today-will need a cardiac cath in the next week.  Will await date of cath and postpone chemo as needed.   \par \par Plan:\par Jakafi twice a day.  \par Allopurinol-pt takes this for gout.\par Will plan for another cycle of Dacogen week of 2/8, will most likely postpone until after cath.\par CMP, LDH, lipid profile\par RTC one week.\par  \par

## 2021-02-04 NOTE — HISTORY OF PRESENT ILLNESS
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  Has chronic tenesmus.  Being followed by Dr Altamirano/Pradip at [facility] for SCT, tentatively planned for March; this had been postponed d/t hemorrhoid surgery.   Had hemorrhoidectomy on 12/15 with Dr Rios; site healing well, denies pain to area.  Feeling well with improved energy.  Started Jakafi 2 tabs daily on 1/6/21.  Has more constipation since increasing Jakafi which he tries to manage.   Saw Dr Rios today and is cleared to resume chemo.  Also saw Dr Leo Colin (cardiology) who plans for cardiac cath next week.    \par \par \par

## 2021-02-04 NOTE — REVIEW OF SYSTEMS
[Abdominal Pain] : abdominal pain [Constipation] : constipation [Joint Pain] : joint pain [Negative] : Allergic/Immunologic [Skin Wound] : no skin wound [FreeTextEntry7] : intermittent constipation

## 2021-02-05 DIAGNOSIS — Z01.818 ENCOUNTER FOR OTHER PREPROCEDURAL EXAMINATION: ICD-10-CM

## 2021-02-05 PROBLEM — Z09 POSTOPERATIVE EXAMINATION: Status: ACTIVE | Noted: 2020-12-30

## 2021-02-05 NOTE — PHYSICAL EXAM
[FreeTextEntry1] : This is a 71 year-old well-developed male in no apparent distress.\par Examination of the perineum reveals the left-sided perianal open wound has healed. \par There is a mild, noninflamed right posterolateral hemorrhoid. \par Digital rectal exam shows no stenosis or masses and is unremarkable.

## 2021-02-05 NOTE — HISTORY OF PRESENT ILLNESS
[FreeTextEntry1] : Janine is a 71 year old male here for a follow up visit. Last seen on 01/11/21. Today he reports feeling well. Reports having 3-4 soft BM's daily. Occasional spotting and itching noted. Takes Miralax daily and Metamucil.  No FI.  Says he is scheduled to restart chemotherapy next week.

## 2021-02-05 NOTE — ASSESSMENT
[FreeTextEntry1] : Doing well postop.\par Okay from my standpoint to proceed with chemotherapy and stem cell transplant.\par RTO as needed.\par \par

## 2021-02-05 NOTE — CONSULT LETTER
[Dear  ___] : Dear  [unfilled], [Courtesy Letter:] : I had the pleasure of seeing your patient, [unfilled], in my office today. [Please see my note below.] : Please see my note below. [Sincerely,] : Sincerely, [DrGuille  ___] : Dr. MONTES [DrGuille ___] : Dr. MONTES [FreeTextEntry2] : Dr Noam Coker [FreeTextEntry3] : Nayeli Rios M.D., F.A.C.S., F.JORGE.S.C.R.S.\par Assistant Professor of Surgery\par [employer] [facility]\par \par

## 2021-02-06 ENCOUNTER — APPOINTMENT (OUTPATIENT)
Dept: DISASTER EMERGENCY | Facility: CLINIC | Age: 71
End: 2021-02-06

## 2021-02-06 ENCOUNTER — RESULT CHARGE (OUTPATIENT)
Age: 71
End: 2021-02-06

## 2021-02-07 ENCOUNTER — NON-APPOINTMENT (OUTPATIENT)
Age: 71
End: 2021-02-07

## 2021-02-07 VITALS — SYSTOLIC BLOOD PRESSURE: 106 MMHG | DIASTOLIC BLOOD PRESSURE: 64 MMHG

## 2021-02-07 NOTE — HISTORY OF PRESENT ILLNESS
[FreeTextEntry1] : Yesterday, while sitting, experienced diffuse pain lower back, upper and lower exremities, lasting several hours occurring intermittently, 2/10 severity. During this, he experienced left and right-sided chest "pressure" or "stretching" coming and going X 1/2 hour. The discomfort was unrelated to exertion; taking a deep breath may have helped.  There was no associated shortness of breath or palpitations.\par He has had nasal congestion, sneezing, and epistaxis X 3 weeks. COVID test was (-) last week..

## 2021-02-08 ENCOUNTER — APPOINTMENT (OUTPATIENT)
Dept: INFUSION THERAPY | Facility: HOSPITAL | Age: 71
End: 2021-02-08

## 2021-02-09 ENCOUNTER — APPOINTMENT (OUTPATIENT)
Dept: INFUSION THERAPY | Facility: HOSPITAL | Age: 71
End: 2021-02-09

## 2021-02-09 ENCOUNTER — OUTPATIENT (OUTPATIENT)
Dept: OUTPATIENT SERVICES | Facility: HOSPITAL | Age: 71
LOS: 1 days | End: 2021-02-09
Payer: MEDICARE

## 2021-02-09 VITALS
OXYGEN SATURATION: 98 % | WEIGHT: 175.93 LBS | DIASTOLIC BLOOD PRESSURE: 71 MMHG | RESPIRATION RATE: 20 BRPM | SYSTOLIC BLOOD PRESSURE: 124 MMHG | TEMPERATURE: 98 F | HEART RATE: 73 BPM | HEIGHT: 68 IN

## 2021-02-09 VITALS
DIASTOLIC BLOOD PRESSURE: 66 MMHG | OXYGEN SATURATION: 98 % | SYSTOLIC BLOOD PRESSURE: 128 MMHG | HEART RATE: 77 BPM | RESPIRATION RATE: 17 BRPM

## 2021-02-09 DIAGNOSIS — K62.89 OTHER SPECIFIED DISEASES OF ANUS AND RECTUM: Chronic | ICD-10-CM

## 2021-02-09 DIAGNOSIS — Z98.890 OTHER SPECIFIED POSTPROCEDURAL STATES: Chronic | ICD-10-CM

## 2021-02-09 DIAGNOSIS — Z98.52 VASECTOMY STATUS: Chronic | ICD-10-CM

## 2021-02-09 DIAGNOSIS — I25.10 ATHEROSCLEROTIC HEART DISEASE OF NATIVE CORONARY ARTERY WITHOUT ANGINA PECTORIS: ICD-10-CM

## 2021-02-09 DIAGNOSIS — R94.39 ABNORMAL RESULT OF OTHER CARDIOVASCULAR FUNCTION STUDY: ICD-10-CM

## 2021-02-09 LAB
ANION GAP SERPL CALC-SCNC: 12 MMOL/L — SIGNIFICANT CHANGE UP (ref 5–17)
BUN SERPL-MCNC: 22 MG/DL — SIGNIFICANT CHANGE UP (ref 7–23)
CALCIUM SERPL-MCNC: 9.6 MG/DL — SIGNIFICANT CHANGE UP (ref 8.4–10.5)
CHLORIDE SERPL-SCNC: 105 MMOL/L — SIGNIFICANT CHANGE UP (ref 96–108)
CO2 SERPL-SCNC: 25 MMOL/L — SIGNIFICANT CHANGE UP (ref 22–31)
CREAT SERPL-MCNC: 0.69 MG/DL — SIGNIFICANT CHANGE UP (ref 0.5–1.3)
GLUCOSE SERPL-MCNC: 83 MG/DL — SIGNIFICANT CHANGE UP (ref 70–99)
HCT VFR BLD CALC: 33.7 % — LOW (ref 39–50)
HGB BLD-MCNC: 9.9 G/DL — LOW (ref 13–17)
MCHC RBC-ENTMCNC: 27.5 PG — SIGNIFICANT CHANGE UP (ref 27–34)
MCHC RBC-ENTMCNC: 29.4 GM/DL — LOW (ref 32–36)
MCV RBC AUTO: 93.6 FL — SIGNIFICANT CHANGE UP (ref 80–100)
NRBC # BLD: 4 /100 WBCS — HIGH (ref 0–0)
PLATELET # BLD AUTO: 281 K/UL — SIGNIFICANT CHANGE UP (ref 150–400)
POTASSIUM SERPL-MCNC: 4.3 MMOL/L — SIGNIFICANT CHANGE UP (ref 3.5–5.3)
POTASSIUM SERPL-SCNC: 4.3 MMOL/L — SIGNIFICANT CHANGE UP (ref 3.5–5.3)
RBC # BLD: 3.6 M/UL — LOW (ref 4.2–5.8)
RBC # FLD: 19.7 % — HIGH (ref 10.3–14.5)
SODIUM SERPL-SCNC: 142 MMOL/L — SIGNIFICANT CHANGE UP (ref 135–145)
WBC # BLD: 35.34 K/UL — HIGH (ref 3.8–10.5)
WBC # FLD AUTO: 35.34 K/UL — HIGH (ref 3.8–10.5)

## 2021-02-09 PROCEDURE — C1769: CPT

## 2021-02-09 PROCEDURE — 93454 CORONARY ARTERY ANGIO S&I: CPT

## 2021-02-09 PROCEDURE — 80048 BASIC METABOLIC PNL TOTAL CA: CPT

## 2021-02-09 PROCEDURE — C1894: CPT

## 2021-02-09 PROCEDURE — 85027 COMPLETE CBC AUTOMATED: CPT

## 2021-02-09 PROCEDURE — 93010 ELECTROCARDIOGRAM REPORT: CPT

## 2021-02-09 PROCEDURE — 93005 ELECTROCARDIOGRAM TRACING: CPT

## 2021-02-09 PROCEDURE — 93454 CORONARY ARTERY ANGIO S&I: CPT | Mod: 26

## 2021-02-09 PROCEDURE — C1887: CPT

## 2021-02-09 RX ORDER — SODIUM CHLORIDE 9 MG/ML
3 INJECTION INTRAMUSCULAR; INTRAVENOUS; SUBCUTANEOUS EVERY 8 HOURS
Refills: 0 | Status: DISCONTINUED | OUTPATIENT
Start: 2021-02-09 | End: 2021-02-24

## 2021-02-09 NOTE — H&P CARDIOLOGY - HISTORY OF PRESENT ILLNESS
This is 71 year old male with past medical history  of HTN, HLD, CAD, mitral regurgitation gout, myeloproliferative disease- myelofibrosis with monocytosis.  Completed cycle 11/10-11/14  at [facility] in preparation for stem cell trasplant. PT endorses diffuse pain lower back, upper and lower extremities lasting several hours occurring intermittently, 2/10 severity. During this, he experienced left and right-sided chest "pressure" or "stretching" coming and going X 1/2 hour. The discomfort was unrelated to exertion; taking a deep breath may have helped. There was no associated shortness of breath or palpitations. stress test on 12/1/20 reveals small, mild to mod defects in basal inferoseptal, inferior walls that are partially reversible consistent with infarction EF~62%  Echo 11/2020: demonstrates Mod- severe MR, severely Dilated LA, mod diastolic dysfunction (II), normal global LV sys funx, basal-mid inferior wall hypokinetic Ef60-65%, seen & evaluated by Dr Rodriguez & now recommends for [facility].           This is 71 year old male with past medical history  of HTN, HLD, CAD, mitral regurgitation gout, myeloproliferative disease- myelofibrosis with monocytosis.  Completed cycle 11/10-11/14  at [facility] in preparation for stem cell transplant next month 3/11/21 . PT endorses diffuse pain lower back, upper and lower extremities lasting several hours occurring intermittently, 2/10 severity. During this, he experienced left and right-sided chest "pressure" or "stretching" coming and going X 1/2 hour. The discomfort was unrelated to exertion; taking a deep breath may have helped. There was no associated shortness of breath or palpitations. stress test on 12/1/20 reveals small, mild to mod defects in basal inferoseptal, inferior walls that are partially reversible consistent with infarction EF~62%  Echo 11/2020: demonstrates Mod- severe MR, severely Dilated LA, mod diastolic dysfunction (II), normal global LV sys funx, basal-mid inferior wall hypokinetic Ef60-65%, seen & evaluated by Dr Rodriguez & now recommends for [facility].

## 2021-02-09 NOTE — H&P CARDIOLOGY - PSH
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

## 2021-02-09 NOTE — H&P CARDIOLOGY - PMH
Anemia  on slow iron  Back pain    Cholesterol serum increased    GERD (gastroesophageal reflux disease)    Hearing loss  right ear hearing los second to acoustic neuroma surgery with some decreased hearing in left ear also  Herniated disc  L2-3 withlower bulge  Hypertension    Myeloproliferative disease    Pain Disorder  severe back pain lumbar with history of lumbar epidural steroid injections  Rectal bleed  bleeding hemmoroids  Spinal stenosis

## 2021-02-09 NOTE — ASU DISCHARGE PLAN (ADULT/PEDIATRIC) - CARE PROVIDER_API CALL
Robert Camarena)  Cardiovascular Disease; Interventional Cardiology  [address] [place] [address]  Phone: [phone]  Fax: [phone]  Follow Up Time:

## 2021-02-09 NOTE — ASU DISCHARGE PLAN (ADULT/PEDIATRIC) - ASU DC SPECIAL INSTRUCTIONSFT
No heavy lifting,  pushing, pulling with affected arm for 2 weeks.  No strenuous  activity  for 2 weeks.  No driving for 2 days. You may shower 24 hours following procedure but avoid baths and swimming for 1 week. Check wrist site for bleeding and/or swelling daily following procedure. Call your doctor/cardiologist immediately should it occur or if you have increased/persistent pain at the site. Follow up with your cardiologist in 1- 2 weeks. You may call [facility] at [phone] or [phone] after office hours and weekends with any questions or concerns following your procedure.

## 2021-02-10 ENCOUNTER — APPOINTMENT (OUTPATIENT)
Dept: CARDIOLOGY | Facility: CLINIC | Age: 71
End: 2021-02-10
Payer: MEDICARE

## 2021-02-10 ENCOUNTER — NON-APPOINTMENT (OUTPATIENT)
Age: 71
End: 2021-02-10

## 2021-02-10 ENCOUNTER — APPOINTMENT (OUTPATIENT)
Dept: INFUSION THERAPY | Facility: HOSPITAL | Age: 71
End: 2021-02-10

## 2021-02-10 VITALS
HEIGHT: 68 IN | HEART RATE: 67 BPM | OXYGEN SATURATION: 99 % | TEMPERATURE: 97.7 F | RESPIRATION RATE: 18 BRPM | WEIGHT: 180 LBS | BODY MASS INDEX: 27.28 KG/M2 | DIASTOLIC BLOOD PRESSURE: 60 MMHG | SYSTOLIC BLOOD PRESSURE: 120 MMHG

## 2021-02-10 PROCEDURE — 93000 ELECTROCARDIOGRAM COMPLETE: CPT

## 2021-02-10 PROCEDURE — 99214 OFFICE O/P EST MOD 30 MIN: CPT

## 2021-02-11 ENCOUNTER — APPOINTMENT (OUTPATIENT)
Dept: INFUSION THERAPY | Facility: HOSPITAL | Age: 71
End: 2021-02-11

## 2021-02-12 ENCOUNTER — APPOINTMENT (OUTPATIENT)
Dept: INFUSION THERAPY | Facility: HOSPITAL | Age: 71
End: 2021-02-12

## 2021-02-12 ENCOUNTER — RESULT REVIEW (OUTPATIENT)
Age: 71
End: 2021-02-12

## 2021-02-12 ENCOUNTER — APPOINTMENT (OUTPATIENT)
Dept: HEMATOLOGY ONCOLOGY | Facility: CLINIC | Age: 71
End: 2021-02-12
Payer: MEDICARE

## 2021-02-12 VITALS
HEIGHT: 68 IN | SYSTOLIC BLOOD PRESSURE: 134 MMHG | RESPIRATION RATE: 16 BRPM | TEMPERATURE: 97.6 F | BODY MASS INDEX: 27.26 KG/M2 | DIASTOLIC BLOOD PRESSURE: 78 MMHG | WEIGHT: 179.9 LBS | OXYGEN SATURATION: 99 % | HEART RATE: 64 BPM

## 2021-02-12 LAB
BASOPHILS # BLD AUTO: 0 K/UL — SIGNIFICANT CHANGE UP (ref 0–0.2)
BASOPHILS NFR BLD AUTO: 0 % — SIGNIFICANT CHANGE UP (ref 0–2)
BLASTS # FLD: 4 % — HIGH (ref 0–0)
DACRYOCYTES BLD QL SMEAR: SLIGHT — SIGNIFICANT CHANGE UP
EOSINOPHIL # BLD AUTO: 0 K/UL — SIGNIFICANT CHANGE UP (ref 0–0.5)
EOSINOPHIL NFR BLD AUTO: 0 % — SIGNIFICANT CHANGE UP (ref 0–6)
HCT VFR BLD CALC: 32.6 % — LOW (ref 39–50)
HGB BLD-MCNC: 10.4 G/DL — LOW (ref 13–17)
LYMPHOCYTES # BLD AUTO: 12 % — LOW (ref 13–44)
LYMPHOCYTES # BLD AUTO: 3.87 K/UL — HIGH (ref 1–3.3)
MCHC RBC-ENTMCNC: 28.8 PG — SIGNIFICANT CHANGE UP (ref 27–34)
MCHC RBC-ENTMCNC: 31.9 G/DL — LOW (ref 32–36)
MCV RBC AUTO: 90.3 FL — SIGNIFICANT CHANGE UP (ref 80–100)
METAMYELOCYTES # FLD: 3 % — HIGH (ref 0–0)
MONOCYTES # BLD AUTO: 4.19 K/UL — HIGH (ref 0–0.9)
MONOCYTES NFR BLD AUTO: 13 % — SIGNIFICANT CHANGE UP (ref 2–14)
MYELOCYTES NFR BLD: 9 % — HIGH (ref 0–0)
NEUTROPHILS # BLD AUTO: 19.01 K/UL — HIGH (ref 1.8–7.4)
NEUTROPHILS NFR BLD AUTO: 59 % — SIGNIFICANT CHANGE UP (ref 43–77)
NRBC # BLD: 5 /100 — HIGH (ref 0–0)
NRBC # BLD: SIGNIFICANT CHANGE UP /100 WBCS (ref 0–0)
PLAT MORPH BLD: NORMAL — SIGNIFICANT CHANGE UP
PLATELET # BLD AUTO: 243 K/UL — SIGNIFICANT CHANGE UP (ref 150–400)
POIKILOCYTOSIS BLD QL AUTO: SLIGHT — SIGNIFICANT CHANGE UP
POLYCHROMASIA BLD QL SMEAR: SLIGHT — SIGNIFICANT CHANGE UP
RBC # BLD: 3.61 M/UL — LOW (ref 4.2–5.8)
RBC # FLD: 19.6 % — HIGH (ref 10.3–14.5)
RBC BLD AUTO: ABNORMAL
WBC # BLD: 32.22 K/UL — HIGH (ref 3.8–10.5)
WBC # FLD AUTO: 32.22 K/UL — HIGH (ref 3.8–10.5)

## 2021-02-12 PROCEDURE — 99213 OFFICE O/P EST LOW 20 MIN: CPT

## 2021-02-15 NOTE — ASSESSMENT
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 The latter is not clinically significant. \par \par Weight loss, worsening anemia, increased blasts in peripheral blood and marrow, increased WBC indicate progression to more aggressive disease that warrants treatment. DIPSS score-6, high risk category. Treatment options explored with pt including Jakafi, alloSCT and clinical trials. Consultation at [facility] with Dr. Patrick Gonzales completed.  Received dacogen + Jakafi C 2 began 11/10/20; pt had symptomatic improvement and resolution of splenomegaly. Pt had hemorrhoidectomy on 12/15 by Dr Rios; was found to be non-malignant.  Pt is now cleared by Dr Rios to get chemo.  Saw Dr Colin (cardiology) and had cardaic cath on 2/9/21 with no abnormal findings.  Will start next cycle of chemo on 2/16.    \par \par Plan:\par Jakafi twice a day.  \par Allopurinol-pt takes this for gout.\par Dacogen 2/16 for 5 days.\par CMP, LDH, lipid profile\par RTC 10 days.\par  \par

## 2021-02-15 NOTE — RESULTS/DATA
[FreeTextEntry1] : WBC 83462 Hgb 10.4 Hct 32.6 Platelets 243,000 50P 12L 13M 0Eos 0 Ba\par \par Blasts 2%  \par  \par Final Diagnosis\par 1. Anal canal mass, biopsy:\par - Fibroadipose tissue with active inflammation, focal\par abscess formation, necrosis and\par inflamed granulation tissue\par - No epithelium present\par \par 2. Anal canal mass, excisional biopsy:\par - Inflamed granulation tissue\par - No epithelium present\par \par 3. Anal canal mass, excisional biopsy:\par - Squamous mucosa with active inflammation, inflamed\par granulation tissue and dense\par fibrosis\par - Negative for carcinoma\par \par 4. Anal canal mass, excisional biopsy:\par - Squamous tissue with active inflammation, focal abscess\par formation, necrosis and\par inflamed granulation tissue\par - Negative for carcinoma\par \par 5. Anal canal mass, resection:\par - Squamous tissue with active inflammation, focal abscess\par formation, necrosis and\par inflamed granulation tissue\par - Squamous epithelium with reactive changes, Negative for\par carcinoma\par

## 2021-02-15 NOTE — HISTORY OF PRESENT ILLNESS
[Disease:__________________________] : Disease: [unfilled] [Cycle: ___] : Cycle: [unfilled] [Day: ___] : Day: [unfilled] [de-identified] : 4/2019 ; 9/2020 Secondary myelofibrosis with monocytosis , fibrosis grade 2-3/3; 46,XY, del(13)(q12q14); NGS + MPL, TET2\par 5/20 MBUS + dim lambda, CD 5, 19, 20, 23, 38\par 5/20 [place] NGS + TET2, MPL, CHEK2, CUX1, PHF6 [FreeTextEntry1] : 10/2020 -present Decitabine/Jakafi [de-identified] : JAK2, bcr abl, calreticulin negative\par mpl exon 10 + [de-identified] : Denies fevers, chills, night sweats, bleeding, abdominal pain.  Has chronic tenesmus.  Being followed by Dr Altamirano/Pradip at [facility] for SCT, tentatively planned for March; this had been postponed d/t hemorrhoid surgery.   Had hemorrhoidectomy on 12/15 with Dr Rios; site healing well, denies pain to area.  Feeling well with improved energy.  Started Jakafi 2 tabs daily on 1/6/21.  Has more constipation since increasing Jakafi which he tries to manage.   Saw Dr Rios and is cleared to resume chemo.  Had cardiac cath on 2/9 with no abnormal findings.    \par \par \par

## 2021-02-15 NOTE — PHYSICAL EXAM
[Fully active, able to carry on all pre-disease performance without restriction] : Status 0 - Fully active, able to carry on all pre-disease performance without restriction [Normal] : affect appropriate [de-identified] : No dullness in Traube's space.  [de-identified] : R first finger swelling and redness around nail bed

## 2021-02-16 ENCOUNTER — RESULT REVIEW (OUTPATIENT)
Age: 71
End: 2021-02-16

## 2021-02-16 ENCOUNTER — APPOINTMENT (OUTPATIENT)
Dept: INFUSION THERAPY | Facility: HOSPITAL | Age: 71
End: 2021-02-16

## 2021-02-16 DIAGNOSIS — R11.2 NAUSEA WITH VOMITING, UNSPECIFIED: ICD-10-CM

## 2021-02-16 DIAGNOSIS — Z51.11 ENCOUNTER FOR ANTINEOPLASTIC CHEMOTHERAPY: ICD-10-CM

## 2021-02-16 LAB
BASOPHILS # BLD AUTO: 0 K/UL — SIGNIFICANT CHANGE UP (ref 0–0.2)
BASOPHILS NFR BLD AUTO: 0 % — SIGNIFICANT CHANGE UP (ref 0–2)
BLASTS # FLD: 4 % — HIGH (ref 0–0)
DACRYOCYTES BLD QL SMEAR: SLIGHT — SIGNIFICANT CHANGE UP
EOSINOPHIL # BLD AUTO: 0 K/UL — SIGNIFICANT CHANGE UP (ref 0–0.5)
EOSINOPHIL NFR BLD AUTO: 0 % — SIGNIFICANT CHANGE UP (ref 0–6)
HCT VFR BLD CALC: 33.8 % — LOW (ref 39–50)
HGB BLD-MCNC: 10.7 G/DL — LOW (ref 13–17)
LYMPHOCYTES # BLD AUTO: 24 % — SIGNIFICANT CHANGE UP (ref 13–44)
LYMPHOCYTES # BLD AUTO: 7.44 K/UL — HIGH (ref 1–3.3)
MCHC RBC-ENTMCNC: 28.8 PG — SIGNIFICANT CHANGE UP (ref 27–34)
MCHC RBC-ENTMCNC: 31.7 G/DL — LOW (ref 32–36)
MCV RBC AUTO: 91.1 FL — SIGNIFICANT CHANGE UP (ref 80–100)
METAMYELOCYTES # FLD: 2 % — HIGH (ref 0–0)
MONOCYTES # BLD AUTO: 3.41 K/UL — HIGH (ref 0–0.9)
MONOCYTES NFR BLD AUTO: 11 % — SIGNIFICANT CHANGE UP (ref 2–14)
MYELOCYTES NFR BLD: 9 % — HIGH (ref 0–0)
NEUTROPHILS # BLD AUTO: 15.49 K/UL — HIGH (ref 1.8–7.4)
NEUTROPHILS NFR BLD AUTO: 50 % — SIGNIFICANT CHANGE UP (ref 43–77)
NRBC # BLD: 2 /100 — HIGH (ref 0–0)
NRBC # BLD: SIGNIFICANT CHANGE UP /100 WBCS (ref 0–0)
PLAT MORPH BLD: NORMAL — SIGNIFICANT CHANGE UP
PLATELET # BLD AUTO: 254 K/UL — SIGNIFICANT CHANGE UP (ref 150–400)
POIKILOCYTOSIS BLD QL AUTO: SLIGHT — SIGNIFICANT CHANGE UP
POLYCHROMASIA BLD QL SMEAR: SLIGHT — SIGNIFICANT CHANGE UP
RBC # BLD: 3.71 M/UL — LOW (ref 4.2–5.8)
RBC # FLD: 20.3 % — HIGH (ref 10.3–14.5)
RBC BLD AUTO: ABNORMAL
WBC # BLD: 30.98 K/UL — HIGH (ref 3.8–10.5)
WBC # FLD AUTO: 30.98 K/UL — HIGH (ref 3.8–10.5)

## 2021-02-16 RX ORDER — POLYETHYLENE GLYCOL 3350 17 G/17G
0 POWDER, FOR SOLUTION ORAL
Qty: 0 | Refills: 0 | DISCHARGE

## 2021-02-16 RX ORDER — VALSARTAN 80 MG/1
1 TABLET ORAL
Qty: 0 | Refills: 0 | DISCHARGE

## 2021-02-16 RX ORDER — OMEGA-3 ACID ETHYL ESTERS 1 G
0 CAPSULE ORAL
Qty: 0 | Refills: 0 | DISCHARGE

## 2021-02-16 RX ORDER — ATORVASTATIN CALCIUM 80 MG/1
1 TABLET, FILM COATED ORAL
Qty: 0 | Refills: 0 | DISCHARGE

## 2021-02-16 RX ORDER — CYCLOBENZAPRINE HYDROCHLORIDE 10 MG/1
1 TABLET, FILM COATED ORAL
Qty: 0 | Refills: 0 | DISCHARGE

## 2021-02-16 RX ORDER — ALLOPURINOL 300 MG
1 TABLET ORAL
Qty: 0 | Refills: 0 | DISCHARGE

## 2021-02-16 RX ORDER — DICLOXACILLIN SODIUM 500 MG/1
1 CAPSULE ORAL
Qty: 0 | Refills: 0 | DISCHARGE

## 2021-02-16 RX ORDER — COLCHICINE 0.6 MG
1 TABLET ORAL
Qty: 0 | Refills: 0 | DISCHARGE

## 2021-02-16 RX ORDER — RUXOLITINIB 25 MG/1
1 TABLET ORAL
Qty: 0 | Refills: 0 | DISCHARGE

## 2021-02-16 RX ORDER — TADALAFIL 10 MG/1
1 TABLET, FILM COATED ORAL
Qty: 0 | Refills: 0 | DISCHARGE

## 2021-02-16 RX ORDER — ASPIRIN/CALCIUM CARB/MAGNESIUM 324 MG
1 TABLET ORAL
Qty: 0 | Refills: 0 | DISCHARGE

## 2021-02-17 ENCOUNTER — APPOINTMENT (OUTPATIENT)
Dept: INFUSION THERAPY | Facility: HOSPITAL | Age: 71
End: 2021-02-17

## 2021-02-18 ENCOUNTER — APPOINTMENT (OUTPATIENT)
Dept: INFUSION THERAPY | Facility: HOSPITAL | Age: 71
End: 2021-02-18

## 2021-02-19 ENCOUNTER — RESULT CHARGE (OUTPATIENT)
Age: 71
End: 2021-02-19

## 2021-02-19 ENCOUNTER — APPOINTMENT (OUTPATIENT)
Dept: INFUSION THERAPY | Facility: HOSPITAL | Age: 71
End: 2021-02-19

## 2021-02-19 ENCOUNTER — NON-APPOINTMENT (OUTPATIENT)
Age: 71
End: 2021-02-19

## 2021-02-20 ENCOUNTER — APPOINTMENT (OUTPATIENT)
Dept: INFUSION THERAPY | Facility: HOSPITAL | Age: 71
End: 2021-02-20

## 2021-02-20 RX ORDER — VALSARTAN 80 MG/1
80 TABLET, COATED ORAL
Qty: 90 | Refills: 3 | Status: DISCONTINUED | COMMUNITY
Start: 2019-02-26 | End: 2021-02-20

## 2021-02-20 NOTE — HISTORY OF PRESENT ILLNESS
[FreeTextEntry1] : Cardiac cath:\par LM:   --  LM: Normal. \par LAD:   --  Proximal LAD: Angiography showed minor luminal irregularities with no flow limiting lesions. \par CX:   --  Circumflex: Normal. \par RI:   --  Proximal ramus intermedius: The vessel was small sized. Angiography showed moderate atherosclerosis. \par RCA:   --  RCA: Normal.\par \par Chest pains and upper extremity pains in varying locations, as before.

## 2021-02-21 ENCOUNTER — NON-APPOINTMENT (OUTPATIENT)
Age: 71
End: 2021-02-21

## 2021-02-24 ENCOUNTER — APPOINTMENT (OUTPATIENT)
Dept: CARDIOLOGY | Facility: CLINIC | Age: 71
End: 2021-02-24
Payer: MEDICARE

## 2021-02-24 ENCOUNTER — APPOINTMENT (OUTPATIENT)
Dept: HEMATOLOGY ONCOLOGY | Facility: CLINIC | Age: 71
End: 2021-02-24
Payer: MEDICARE

## 2021-02-24 ENCOUNTER — RESULT REVIEW (OUTPATIENT)
Age: 71
End: 2021-02-24

## 2021-02-24 ENCOUNTER — NON-APPOINTMENT (OUTPATIENT)
Age: 71
End: 2021-02-24

## 2021-02-24 VITALS
DIASTOLIC BLOOD PRESSURE: 81 MMHG | WEIGHT: 184.95 LBS | HEART RATE: 96 BPM | BODY MASS INDEX: 26.78 KG/M2 | RESPIRATION RATE: 18 BRPM | HEIGHT: 69.84 IN | OXYGEN SATURATION: 67 % | TEMPERATURE: 98.2 F | SYSTOLIC BLOOD PRESSURE: 126 MMHG

## 2021-02-24 VITALS
HEART RATE: 78 BPM | SYSTOLIC BLOOD PRESSURE: 140 MMHG | DIASTOLIC BLOOD PRESSURE: 78 MMHG | WEIGHT: 183 LBS | TEMPERATURE: 97.7 F | OXYGEN SATURATION: 99 % | BODY MASS INDEX: 27.83 KG/M2

## 2021-02-24 VITALS — SYSTOLIC BLOOD PRESSURE: 122 MMHG | DIASTOLIC BLOOD PRESSURE: 70 MMHG

## 2021-02-24 LAB
BASOPHILS # BLD AUTO: 0.91 K/UL — HIGH (ref 0–0.2)
BASOPHILS NFR BLD AUTO: 4 % — HIGH (ref 0–2)
BLASTS # FLD: 6 % — HIGH (ref 0–0)
DACRYOCYTES BLD QL SMEAR: SLIGHT — SIGNIFICANT CHANGE UP
EOSINOPHIL # BLD AUTO: 0.23 K/UL — SIGNIFICANT CHANGE UP (ref 0–0.5)
EOSINOPHIL NFR BLD AUTO: 1 % — SIGNIFICANT CHANGE UP (ref 0–6)
HCT VFR BLD CALC: 31.6 % — LOW (ref 39–50)
HGB BLD-MCNC: 9.6 G/DL — LOW (ref 13–17)
LYMPHOCYTES # BLD AUTO: 11 % — LOW (ref 13–44)
LYMPHOCYTES # BLD AUTO: 2.51 K/UL — SIGNIFICANT CHANGE UP (ref 1–3.3)
MCHC RBC-ENTMCNC: 28.3 PG — SIGNIFICANT CHANGE UP (ref 27–34)
MCHC RBC-ENTMCNC: 30.4 G/DL — LOW (ref 32–36)
MCV RBC AUTO: 93.2 FL — SIGNIFICANT CHANGE UP (ref 80–100)
METAMYELOCYTES # FLD: 3 % — HIGH (ref 0–0)
MONOCYTES # BLD AUTO: 1.37 K/UL — HIGH (ref 0–0.9)
MONOCYTES NFR BLD AUTO: 6 % — SIGNIFICANT CHANGE UP (ref 2–14)
MYELOCYTES NFR BLD: 11 % — HIGH (ref 0–0)
NEUTROPHILS # BLD AUTO: 13.03 K/UL — HIGH (ref 1.8–7.4)
NEUTROPHILS NFR BLD AUTO: 55 % — SIGNIFICANT CHANGE UP (ref 43–77)
NEUTS BAND # BLD: 2 % — SIGNIFICANT CHANGE UP (ref 0–8)
NRBC # BLD: 1 /100 — HIGH (ref 0–0)
NRBC # BLD: SIGNIFICANT CHANGE UP /100 WBCS (ref 0–0)
PLAT MORPH BLD: NORMAL — SIGNIFICANT CHANGE UP
PLATELET # BLD AUTO: 230 K/UL — SIGNIFICANT CHANGE UP (ref 150–400)
POIKILOCYTOSIS BLD QL AUTO: SLIGHT — SIGNIFICANT CHANGE UP
PROMYELOCYTES # FLD: 1 % — HIGH (ref 0–0)
RBC # BLD: 3.39 M/UL — LOW (ref 4.2–5.8)
RBC # FLD: 19.9 % — HIGH (ref 10.3–14.5)
RBC BLD AUTO: ABNORMAL
WBC # BLD: 22.86 K/UL — HIGH (ref 3.8–10.5)
WBC # FLD AUTO: 22.86 K/UL — HIGH (ref 3.8–10.5)

## 2021-02-24 PROCEDURE — 99214 OFFICE O/P EST MOD 30 MIN: CPT

## 2021-02-24 PROCEDURE — 93000 ELECTROCARDIOGRAM COMPLETE: CPT

## 2021-02-24 PROCEDURE — 99213 OFFICE O/P EST LOW 20 MIN: CPT

## 2021-02-24 NOTE — HISTORY OF PRESENT ILLNESS
[FreeTextEntry1] : He is now off valsartan.\par He continues to have chest pains, as before.  They are "sharp" or "dull" discomforts which occur in various locations of his chest.  At times, they appear to be associated with pains in varying locations of his right or left upper extremities.  Typically, the chest pains last several minutes, but on occasion can recur intermittently for up to 1/2-hour.  \par He denies shortness of breath and palpitations.\par He is scheduled for admission for his stem cell transplant on 3/11/2021.

## 2021-02-27 ENCOUNTER — NON-APPOINTMENT (OUTPATIENT)
Age: 71
End: 2021-02-27

## 2021-02-28 ENCOUNTER — NON-APPOINTMENT (OUTPATIENT)
Age: 71
End: 2021-02-28

## 2021-03-02 NOTE — HISTORY OF PRESENT ILLNESS
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  Has chronic tenesmus.  Being followed by Dr Altamirano/Pradip at [facility] for SCT, tentatively planned for March; this had been postponed d/t hemorrhoid surgery.   Had hemorrhoidectomy on 12/15 with Dr Rios; site healing well, denies pain to area.  Feeling well with improved energy.  Started Jakafi 2 tabs daily on 1/6/21.  Has more constipation since increasing Jakafi which he tries to manage.   Saw Dr Rios and is cleared to resume chemo.  Had cardiac cath on 2/9 with no abnormal findings.   Saw Dr Altamirano yesterday in preparation for planned SCT on 3/11.  Appetite is good.  \par \par \par

## 2021-03-02 NOTE — ASSESSMENT
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 The latter is not clinically significant. \par \par Weight loss, worsening anemia, increased blasts in peripheral blood and marrow, increased WBC indicate progression to more aggressive disease that warrants treatment. DIPSS score-6, high risk category. Treatment options explored with pt including Jakafi, alloSCT and clinical trials. Consultation at [facility] with Dr. Patrick Gonzales completed.  Received dacogen + Jakafi C 2 began 11/10/20; pt had symptomatic improvement and resolution of splenomegaly. Pt had hemorrhoidectomy on 12/15 by Dr Rios; was found to be non-malignant.  Pt is now cleared by Dr Rios to get chemo.   Had chemo on 2/16.    SCT tentatively planned for 3/11.  Will check counts next week. \par \par Plan:\par Jakafi twice a day.  \par Allopurinol-pt takes this for gout.\par CMP, LDH, lipid profile\par RTC 7 days.\par  \par

## 2021-03-02 NOTE — RESULTS/DATA
[FreeTextEntry1] : WBC 26562 Hgb 10.4 Hct 32.6 Platelets 243,000 50P 12L 13M 0Eos 0 Ba\par \par Blasts 2%  \par  \par Final Diagnosis\par 1. Anal canal mass, biopsy:\par - Fibroadipose tissue with active inflammation, focal\par abscess formation, necrosis and\par inflamed granulation tissue\par - No epithelium present\par \par 2. Anal canal mass, excisional biopsy:\par - Inflamed granulation tissue\par - No epithelium present\par \par 3. Anal canal mass, excisional biopsy:\par - Squamous mucosa with active inflammation, inflamed\par granulation tissue and dense\par fibrosis\par - Negative for carcinoma\par \par 4. Anal canal mass, excisional biopsy:\par - Squamous tissue with active inflammation, focal abscess\par formation, necrosis and\par inflamed granulation tissue\par - Negative for carcinoma\par \par 5. Anal canal mass, resection:\par - Squamous tissue with active inflammation, focal abscess\par formation, necrosis and\par inflamed granulation tissue\par - Squamous epithelium with reactive changes, Negative for\par carcinoma\par

## 2021-03-02 NOTE — PHYSICAL EXAM
[Fully active, able to carry on all pre-disease performance without restriction] : Status 0 - Fully active, able to carry on all pre-disease performance without restriction [Normal] : affect appropriate [de-identified] : No dullness in Traube's space.  [de-identified] : R first finger swelling and redness around nail bed

## 2021-03-03 ENCOUNTER — RESULT REVIEW (OUTPATIENT)
Age: 71
End: 2021-03-03

## 2021-03-03 ENCOUNTER — APPOINTMENT (OUTPATIENT)
Dept: HEMATOLOGY ONCOLOGY | Facility: CLINIC | Age: 71
End: 2021-03-03
Payer: MEDICARE

## 2021-03-03 VITALS
WEIGHT: 189.8 LBS | RESPIRATION RATE: 14 BRPM | HEIGHT: 70.08 IN | OXYGEN SATURATION: 96 % | DIASTOLIC BLOOD PRESSURE: 83 MMHG | BODY MASS INDEX: 27.17 KG/M2 | HEART RATE: 74 BPM | TEMPERATURE: 98.2 F | SYSTOLIC BLOOD PRESSURE: 132 MMHG

## 2021-03-03 LAB
BASOPHILS # BLD AUTO: 0.29 K/UL — HIGH (ref 0–0.2)
BASOPHILS NFR BLD AUTO: 2 % — SIGNIFICANT CHANGE UP (ref 0–2)
BLASTS # FLD: 2 % — HIGH (ref 0–0)
DACRYOCYTES BLD QL SMEAR: SLIGHT — SIGNIFICANT CHANGE UP
EOSINOPHIL # BLD AUTO: 0.14 K/UL — SIGNIFICANT CHANGE UP (ref 0–0.5)
EOSINOPHIL NFR BLD AUTO: 1 % — SIGNIFICANT CHANGE UP (ref 0–6)
HCT VFR BLD CALC: 32.7 % — LOW (ref 39–50)
HGB BLD-MCNC: 9.9 G/DL — LOW (ref 13–17)
LYMPHOCYTES # BLD AUTO: 24 % — SIGNIFICANT CHANGE UP (ref 13–44)
LYMPHOCYTES # BLD AUTO: 3.44 K/UL — HIGH (ref 1–3.3)
MCHC RBC-ENTMCNC: 28.9 PG — SIGNIFICANT CHANGE UP (ref 27–34)
MCHC RBC-ENTMCNC: 30.3 G/DL — LOW (ref 32–36)
MCV RBC AUTO: 95.3 FL — SIGNIFICANT CHANGE UP (ref 80–100)
METAMYELOCYTES # FLD: 2 % — HIGH (ref 0–0)
MONOCYTES # BLD AUTO: 1.43 K/UL — HIGH (ref 0–0.9)
MONOCYTES NFR BLD AUTO: 10 % — SIGNIFICANT CHANGE UP (ref 2–14)
MYELOCYTES NFR BLD: 10 % — HIGH (ref 0–0)
NEUTROPHILS # BLD AUTO: 7.03 K/UL — SIGNIFICANT CHANGE UP (ref 1.8–7.4)
NEUTROPHILS NFR BLD AUTO: 49 % — SIGNIFICANT CHANGE UP (ref 43–77)
NRBC # BLD: 6 /100 — HIGH (ref 0–0)
NRBC # BLD: SIGNIFICANT CHANGE UP /100 WBCS (ref 0–0)
PLAT MORPH BLD: NORMAL — SIGNIFICANT CHANGE UP
PLATELET # BLD AUTO: 262 K/UL — SIGNIFICANT CHANGE UP (ref 150–400)
POIKILOCYTOSIS BLD QL AUTO: SLIGHT — SIGNIFICANT CHANGE UP
RBC # BLD: 3.43 M/UL — LOW (ref 4.2–5.8)
RBC # FLD: 20.3 % — HIGH (ref 10.3–14.5)
RBC BLD AUTO: ABNORMAL
WBC # BLD: 14.34 K/UL — HIGH (ref 3.8–10.5)
WBC # FLD AUTO: 14.34 K/UL — HIGH (ref 3.8–10.5)

## 2021-03-03 PROCEDURE — 99213 OFFICE O/P EST LOW 20 MIN: CPT

## 2021-03-09 NOTE — RESULTS/DATA
[FreeTextEntry1] : WBC 65762 Hgb 9.9 Hct 32.7 Platelets 262,000 49P 24L 10M 1Eos 2 Ba\par \par Blasts 2%  \par  \par Final Diagnosis\par 1. Anal canal mass, biopsy:\par - Fibroadipose tissue with active inflammation, focal\par abscess formation, necrosis and\par inflamed granulation tissue\par - No epithelium present\par \par 2. Anal canal mass, excisional biopsy:\par - Inflamed granulation tissue\par - No epithelium present\par \par 3. Anal canal mass, excisional biopsy:\par - Squamous mucosa with active inflammation, inflamed\par granulation tissue and dense\par fibrosis\par - Negative for carcinoma\par \par 4. Anal canal mass, excisional biopsy:\par - Squamous tissue with active inflammation, focal abscess\par formation, necrosis and\par inflamed granulation tissue\par - Negative for carcinoma\par \par 5. Anal canal mass, resection:\par - Squamous tissue with active inflammation, focal abscess\par formation, necrosis and\par inflamed granulation tissue\par - Squamous epithelium with reactive changes, Negative for\par carcinoma\par

## 2021-03-09 NOTE — ASSESSMENT
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 The latter is not clinically significant. \par \par Weight loss, worsening anemia, increased blasts in peripheral blood and marrow, increased WBC indicate progression to more aggressive disease that warrants treatment. DIPSS score-6, high risk category. Treatment options explored with pt including Jakafi, alloSCT and clinical trials. Consultation at [facility] with Dr. Patrick Gonzales completed.  Received dacogen + Jakafi C 2 began 11/10/20; pt had symptomatic improvement and resolution of splenomegaly. Pt had hemorrhoidectomy on 12/15 by Dr Rios; was found to be non-malignant.  Had Dacogen on 2/16.  Will be admitted on 3/9 to [facility] for SCT planned on 3/11 with Dr Galo.  \par \par Plan:\par Jakafi twice a day.  \par Allopurinol-pt takes this for gout.\par CMP, LDH, lipid profile\par Follow up once SCT has been completed. \par

## 2021-03-09 NOTE — HISTORY OF PRESENT ILLNESS
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  Has chronic tenesmus.  Being followed by Dr Altamirano/Pradip at [facility] for SCT, tentatively planned for March; this had been postponed d/t hemorrhoid surgery.   Had hemorrhoidectomy on 12/15 with Dr Rios; site healing well, denies pain to area.  Feeling well with improved energy.  Started Jakafi 2 tabs daily on 1/6/21.  Has more constipation since increasing Jakafi which he tries to manage-comes and goes.   Saw Dr Rios and is cleared to resume chemo.  Had cardiac cath on 2/9 with no abnormal findings.   Saw Dr Altamirano yesterday in preparation for planned SCT on 3/11.  Appetite is good.    \par \par \par

## 2021-03-09 NOTE — PHYSICAL EXAM
[Fully active, able to carry on all pre-disease performance without restriction] : Status 0 - Fully active, able to carry on all pre-disease performance without restriction [Normal] : affect appropriate [de-identified] : No dullness in Traube's space.  [de-identified] : R first finger swelling and redness around nail bed

## 2021-10-13 ENCOUNTER — RX RENEWAL (OUTPATIENT)
Age: 71
End: 2021-10-13

## 2021-11-08 ENCOUNTER — APPOINTMENT (OUTPATIENT)
Dept: OPHTHALMOLOGY | Facility: CLINIC | Age: 71
End: 2021-11-08
Payer: MEDICARE

## 2021-11-08 ENCOUNTER — NON-APPOINTMENT (OUTPATIENT)
Age: 71
End: 2021-11-08

## 2021-11-08 PROCEDURE — 92004 COMPRE OPH EXAM NEW PT 1/>: CPT

## 2022-01-01 ENCOUNTER — APPOINTMENT (OUTPATIENT)
Dept: INFUSION THERAPY | Facility: HOSPITAL | Age: 72
End: 2022-01-01

## 2022-01-01 ENCOUNTER — APPOINTMENT (OUTPATIENT)
Dept: HEMATOLOGY ONCOLOGY | Facility: CLINIC | Age: 72
End: 2022-01-01

## 2022-01-01 ENCOUNTER — RESULT REVIEW (OUTPATIENT)
Age: 72
End: 2022-01-01

## 2022-01-01 ENCOUNTER — RESULT CHARGE (OUTPATIENT)
Age: 72
End: 2022-01-01

## 2022-01-01 ENCOUNTER — APPOINTMENT (OUTPATIENT)
Dept: ULTRASOUND IMAGING | Facility: CLINIC | Age: 72
End: 2022-01-01

## 2022-01-01 ENCOUNTER — APPOINTMENT (OUTPATIENT)
Dept: HEMATOLOGY ONCOLOGY | Facility: CLINIC | Age: 72
End: 2022-01-01
Payer: MEDICARE

## 2022-01-01 ENCOUNTER — APPOINTMENT (OUTPATIENT)
Dept: PULMONOLOGY | Facility: CLINIC | Age: 72
End: 2022-01-01

## 2022-01-01 ENCOUNTER — OUTPATIENT (OUTPATIENT)
Dept: OUTPATIENT SERVICES | Facility: HOSPITAL | Age: 72
LOS: 1 days | End: 2022-01-01
Payer: MEDICARE

## 2022-01-01 ENCOUNTER — OUTPATIENT (OUTPATIENT)
Dept: OUTPATIENT SERVICES | Facility: HOSPITAL | Age: 72
LOS: 1 days | Discharge: HOME | End: 2022-01-01

## 2022-01-01 ENCOUNTER — APPOINTMENT (OUTPATIENT)
Dept: GERIATRICS | Facility: CLINIC | Age: 72
End: 2022-01-01

## 2022-01-01 ENCOUNTER — NON-APPOINTMENT (OUTPATIENT)
Age: 72
End: 2022-01-01

## 2022-01-01 ENCOUNTER — RX RENEWAL (OUTPATIENT)
Age: 72
End: 2022-01-01

## 2022-01-01 ENCOUNTER — APPOINTMENT (OUTPATIENT)
Dept: PULMONOLOGY | Facility: CLINIC | Age: 72
End: 2022-01-01
Payer: MEDICARE

## 2022-01-01 ENCOUNTER — APPOINTMENT (OUTPATIENT)
Dept: RADIOLOGY | Facility: CLINIC | Age: 72
End: 2022-01-01
Payer: MEDICARE

## 2022-01-01 ENCOUNTER — APPOINTMENT (OUTPATIENT)
Dept: CT IMAGING | Facility: CLINIC | Age: 72
End: 2022-01-01

## 2022-01-01 ENCOUNTER — APPOINTMENT (OUTPATIENT)
Dept: CARDIOLOGY | Facility: CLINIC | Age: 72
End: 2022-01-01

## 2022-01-01 ENCOUNTER — APPOINTMENT (OUTPATIENT)
Dept: CARDIOLOGY | Facility: CLINIC | Age: 72
End: 2022-01-01
Payer: MEDICARE

## 2022-01-01 ENCOUNTER — OUTPATIENT (OUTPATIENT)
Dept: OUTPATIENT SERVICES | Facility: HOSPITAL | Age: 72
LOS: 1 days | End: 2022-01-01

## 2022-01-01 ENCOUNTER — APPOINTMENT (OUTPATIENT)
Dept: DISASTER EMERGENCY | Facility: HOSPITAL | Age: 72
End: 2022-01-01

## 2022-01-01 ENCOUNTER — APPOINTMENT (OUTPATIENT)
Dept: OPHTHALMOLOGY | Facility: CLINIC | Age: 72
End: 2022-01-01

## 2022-01-01 ENCOUNTER — TRANSCRIPTION ENCOUNTER (OUTPATIENT)
Age: 72
End: 2022-01-01

## 2022-01-01 VITALS
HEART RATE: 82 BPM | WEIGHT: 175 LBS | DIASTOLIC BLOOD PRESSURE: 80 MMHG | BODY MASS INDEX: 25.92 KG/M2 | OXYGEN SATURATION: 95 % | SYSTOLIC BLOOD PRESSURE: 138 MMHG | HEIGHT: 69 IN

## 2022-01-01 VITALS
WEIGHT: 171.08 LBS | OXYGEN SATURATION: 95 % | HEART RATE: 90 BPM | BODY MASS INDEX: 24.49 KG/M2 | SYSTOLIC BLOOD PRESSURE: 120 MMHG | DIASTOLIC BLOOD PRESSURE: 73 MMHG | TEMPERATURE: 97.3 F | RESPIRATION RATE: 18 BRPM

## 2022-01-01 VITALS
TEMPERATURE: 97.9 F | OXYGEN SATURATION: 93 % | HEART RATE: 93 BPM | RESPIRATION RATE: 18 BRPM | DIASTOLIC BLOOD PRESSURE: 75 MMHG | SYSTOLIC BLOOD PRESSURE: 118 MMHG | WEIGHT: 168.65 LBS | BODY MASS INDEX: 24.14 KG/M2

## 2022-01-01 VITALS
OXYGEN SATURATION: 95 % | RESPIRATION RATE: 16 BRPM | DIASTOLIC BLOOD PRESSURE: 79 MMHG | WEIGHT: 167.55 LBS | SYSTOLIC BLOOD PRESSURE: 116 MMHG | TEMPERATURE: 97.7 F | BODY MASS INDEX: 25.08 KG/M2 | HEART RATE: 76 BPM

## 2022-01-01 VITALS
DIASTOLIC BLOOD PRESSURE: 82 MMHG | TEMPERATURE: 97.7 F | SYSTOLIC BLOOD PRESSURE: 131 MMHG | RESPIRATION RATE: 16 BRPM | HEART RATE: 88 BPM | OXYGEN SATURATION: 96 % | WEIGHT: 170.62 LBS | BODY MASS INDEX: 25.54 KG/M2

## 2022-01-01 VITALS
TEMPERATURE: 97.3 F | HEIGHT: 69 IN | OXYGEN SATURATION: 92 % | RESPIRATION RATE: 16 BRPM | WEIGHT: 174.38 LBS | SYSTOLIC BLOOD PRESSURE: 142 MMHG | DIASTOLIC BLOOD PRESSURE: 80 MMHG | BODY MASS INDEX: 25.83 KG/M2 | HEART RATE: 80 BPM

## 2022-01-01 VITALS
RESPIRATION RATE: 18 BRPM | HEIGHT: 68.58 IN | HEART RATE: 79 BPM | BODY MASS INDEX: 25.76 KG/M2 | WEIGHT: 171.96 LBS | OXYGEN SATURATION: 93 % | TEMPERATURE: 97.3 F | SYSTOLIC BLOOD PRESSURE: 140 MMHG | DIASTOLIC BLOOD PRESSURE: 88 MMHG

## 2022-01-01 VITALS
DIASTOLIC BLOOD PRESSURE: 80 MMHG | BODY MASS INDEX: 25.44 KG/M2 | SYSTOLIC BLOOD PRESSURE: 122 MMHG | RESPIRATION RATE: 16 BRPM | OXYGEN SATURATION: 97 % | HEART RATE: 91 BPM | TEMPERATURE: 98.3 F | WEIGHT: 159.99 LBS

## 2022-01-01 VITALS
DIASTOLIC BLOOD PRESSURE: 76 MMHG | WEIGHT: 162 LBS | BODY MASS INDEX: 25.76 KG/M2 | SYSTOLIC BLOOD PRESSURE: 116 MMHG | OXYGEN SATURATION: 98 % | HEART RATE: 92 BPM

## 2022-01-01 VITALS
TEMPERATURE: 98.4 F | OXYGEN SATURATION: 97 % | BODY MASS INDEX: 23.99 KG/M2 | WEIGHT: 167.55 LBS | RESPIRATION RATE: 19 BRPM | SYSTOLIC BLOOD PRESSURE: 109 MMHG | DIASTOLIC BLOOD PRESSURE: 65 MMHG | HEART RATE: 107 BPM

## 2022-01-01 VITALS
SYSTOLIC BLOOD PRESSURE: 167 MMHG | OXYGEN SATURATION: 94 % | BODY MASS INDEX: 26.36 KG/M2 | WEIGHT: 178 LBS | HEART RATE: 89 BPM | DIASTOLIC BLOOD PRESSURE: 95 MMHG | TEMPERATURE: 98.2 F | HEIGHT: 69 IN

## 2022-01-01 VITALS
SYSTOLIC BLOOD PRESSURE: 127 MMHG | WEIGHT: 164 LBS | BODY MASS INDEX: 26.08 KG/M2 | HEART RATE: 93 BPM | DIASTOLIC BLOOD PRESSURE: 81 MMHG | TEMPERATURE: 98.1 F | RESPIRATION RATE: 16 BRPM | OXYGEN SATURATION: 97 %

## 2022-01-01 VITALS
DIASTOLIC BLOOD PRESSURE: 74 MMHG | SYSTOLIC BLOOD PRESSURE: 119 MMHG | BODY MASS INDEX: 24.3 KG/M2 | OXYGEN SATURATION: 96 % | WEIGHT: 169.75 LBS | TEMPERATURE: 98.2 F | HEART RATE: 85 BPM | RESPIRATION RATE: 17 BRPM

## 2022-01-01 VITALS
HEIGHT: 66.5 IN | BODY MASS INDEX: 25.41 KG/M2 | WEIGHT: 160 LBS | OXYGEN SATURATION: 96 % | TEMPERATURE: 98.6 F | SYSTOLIC BLOOD PRESSURE: 110 MMHG | DIASTOLIC BLOOD PRESSURE: 70 MMHG | HEART RATE: 78 BPM

## 2022-01-01 VITALS
TEMPERATURE: 97.9 F | SYSTOLIC BLOOD PRESSURE: 120 MMHG | BODY MASS INDEX: 25.08 KG/M2 | OXYGEN SATURATION: 96 % | WEIGHT: 167.55 LBS | DIASTOLIC BLOOD PRESSURE: 76 MMHG | HEART RATE: 74 BPM | RESPIRATION RATE: 16 BRPM

## 2022-01-01 VITALS
WEIGHT: 176 LBS | OXYGEN SATURATION: 97 % | SYSTOLIC BLOOD PRESSURE: 120 MMHG | DIASTOLIC BLOOD PRESSURE: 86 MMHG | BODY MASS INDEX: 26.31 KG/M2 | HEART RATE: 75 BPM

## 2022-01-01 VITALS — DIASTOLIC BLOOD PRESSURE: 84 MMHG | SYSTOLIC BLOOD PRESSURE: 124 MMHG

## 2022-01-01 VITALS
RESPIRATION RATE: 18 BRPM | TEMPERATURE: 97 F | WEIGHT: 149.91 LBS | HEART RATE: 85 BPM | DIASTOLIC BLOOD PRESSURE: 89 MMHG | HEIGHT: 69 IN | SYSTOLIC BLOOD PRESSURE: 150 MMHG | OXYGEN SATURATION: 94 %

## 2022-01-01 VITALS
DIASTOLIC BLOOD PRESSURE: 90 MMHG | HEART RATE: 76 BPM | WEIGHT: 175 LBS | HEIGHT: 69 IN | OXYGEN SATURATION: 99 % | BODY MASS INDEX: 25.92 KG/M2 | TEMPERATURE: 97.3 F | SYSTOLIC BLOOD PRESSURE: 136 MMHG

## 2022-01-01 VITALS
SYSTOLIC BLOOD PRESSURE: 112 MMHG | HEIGHT: 68.54 IN | BODY MASS INDEX: 24.87 KG/M2 | RESPIRATION RATE: 16 BRPM | WEIGHT: 166.01 LBS | DIASTOLIC BLOOD PRESSURE: 74 MMHG | OXYGEN SATURATION: 97 % | HEART RATE: 71 BPM | TEMPERATURE: 98.1 F

## 2022-01-01 VITALS
OXYGEN SATURATION: 92 % | TEMPERATURE: 98 F | DIASTOLIC BLOOD PRESSURE: 76 MMHG | RESPIRATION RATE: 18 BRPM | SYSTOLIC BLOOD PRESSURE: 132 MMHG | HEART RATE: 78 BPM

## 2022-01-01 VITALS
DIASTOLIC BLOOD PRESSURE: 87 MMHG | BODY MASS INDEX: 26.01 KG/M2 | WEIGHT: 176.15 LBS | OXYGEN SATURATION: 94 % | SYSTOLIC BLOOD PRESSURE: 145 MMHG | HEART RATE: 98 BPM | TEMPERATURE: 97.1 F | RESPIRATION RATE: 18 BRPM

## 2022-01-01 VITALS
WEIGHT: 173 LBS | BODY MASS INDEX: 25.62 KG/M2 | TEMPERATURE: 97.9 F | SYSTOLIC BLOOD PRESSURE: 120 MMHG | HEART RATE: 93 BPM | HEIGHT: 69 IN | DIASTOLIC BLOOD PRESSURE: 80 MMHG

## 2022-01-01 VITALS — DIASTOLIC BLOOD PRESSURE: 74 MMHG | SYSTOLIC BLOOD PRESSURE: 116 MMHG

## 2022-01-01 DIAGNOSIS — Z00.8 ENCOUNTER FOR OTHER GENERAL EXAMINATION: ICD-10-CM

## 2022-01-01 DIAGNOSIS — J98.11 ATELECTASIS: ICD-10-CM

## 2022-01-01 DIAGNOSIS — D72.821 MONOCYTOSIS (SYMPTOMATIC): ICD-10-CM

## 2022-01-01 DIAGNOSIS — Z98.52 VASECTOMY STATUS: Chronic | ICD-10-CM

## 2022-01-01 DIAGNOSIS — K62.89 OTHER SPECIFIED DISEASES OF ANUS AND RECTUM: Chronic | ICD-10-CM

## 2022-01-01 DIAGNOSIS — D75.81 MYELOFIBROSIS: ICD-10-CM

## 2022-01-01 DIAGNOSIS — Z98.890 OTHER SPECIFIED POSTPROCEDURAL STATES: Chronic | ICD-10-CM

## 2022-01-01 DIAGNOSIS — T38.0X5A HYPERGLYCEMIA, UNSPECIFIED: ICD-10-CM

## 2022-01-01 DIAGNOSIS — D47.3 ESSENTIAL (HEMORRHAGIC) THROMBOCYTHEMIA: ICD-10-CM

## 2022-01-01 DIAGNOSIS — D72.820 LYMPHOCYTOSIS (SYMPTOMATIC): ICD-10-CM

## 2022-01-01 DIAGNOSIS — Z87.898 PERSONAL HISTORY OF OTHER SPECIFIED CONDITIONS: ICD-10-CM

## 2022-01-01 DIAGNOSIS — R73.09 OTHER ABNORMAL GLUCOSE: ICD-10-CM

## 2022-01-01 DIAGNOSIS — R73.9 HYPERGLYCEMIA, UNSPECIFIED: ICD-10-CM

## 2022-01-01 DIAGNOSIS — U07.1 COVID-19: ICD-10-CM

## 2022-01-01 DIAGNOSIS — T80.90XA UNSPECIFIED COMPLICATION FOLLOWING INFUSION AND THERAPEUTIC INJECTION, INITIAL ENCOUNTER: ICD-10-CM

## 2022-01-01 DIAGNOSIS — E78.00 PURE HYPERCHOLESTEROLEMIA, UNSPECIFIED: ICD-10-CM

## 2022-01-01 DIAGNOSIS — R05.9 COUGH, UNSPECIFIED: ICD-10-CM

## 2022-01-01 DIAGNOSIS — R00.0 TACHYCARDIA, UNSPECIFIED: ICD-10-CM

## 2022-01-01 DIAGNOSIS — E66.3 OVERWEIGHT: ICD-10-CM

## 2022-01-01 DIAGNOSIS — R06.02 SHORTNESS OF BREATH: ICD-10-CM

## 2022-01-01 DIAGNOSIS — Z94.84 STEM CELLS TRANSPLANT STATUS: ICD-10-CM

## 2022-01-01 DIAGNOSIS — I25.10 ATHEROSCLEROTIC HEART DISEASE OF NATIVE CORONARY ARTERY W/OUT ANGINA PECTORIS: ICD-10-CM

## 2022-01-01 DIAGNOSIS — D89.813 GRAFT-VERSUS-HOST DISEASE, UNSPECIFIED: ICD-10-CM

## 2022-01-01 DIAGNOSIS — R79.89 OTHER SPECIFIED ABNORMAL FINDINGS OF BLOOD CHEMISTRY: ICD-10-CM

## 2022-01-01 DIAGNOSIS — I34.0 NONRHEUMATIC MITRAL (VALVE) INSUFFICIENCY: ICD-10-CM

## 2022-01-01 DIAGNOSIS — I10 ESSENTIAL (PRIMARY) HYPERTENSION: ICD-10-CM

## 2022-01-01 DIAGNOSIS — B37.0 CANDIDAL STOMATITIS: ICD-10-CM

## 2022-01-01 DIAGNOSIS — R93.89 ABNORMAL FINDINGS ON DIAGNOSTIC IMAGING OF OTHER SPECIFIED BODY STRUCTURES: ICD-10-CM

## 2022-01-01 DIAGNOSIS — R06.00 DYSPNEA, UNSPECIFIED: ICD-10-CM

## 2022-01-01 LAB
A1C WITH ESTIMATED AVERAGE GLUCOSE RESULT: 5.7 % — HIGH (ref 4–5.6)
ALBUMIN SERPL ELPH-MCNC: 4.9 G/DL — SIGNIFICANT CHANGE UP (ref 3.3–5)
ALP SERPL-CCNC: 171 U/L — HIGH (ref 40–120)
ALT FLD-CCNC: 101 U/L — HIGH (ref 10–45)
ANION GAP SERPL CALC-SCNC: 12 MMOL/L — SIGNIFICANT CHANGE UP (ref 5–17)
AST SERPL-CCNC: 66 U/L — HIGH (ref 10–40)
BACTERIA SPT CULT: NORMAL
BASOPHILS # BLD AUTO: 0.02 K/UL — SIGNIFICANT CHANGE UP (ref 0–0.2)
BASOPHILS # BLD AUTO: 0.03 K/UL — SIGNIFICANT CHANGE UP (ref 0–0.2)
BASOPHILS # BLD AUTO: 0.03 K/UL — SIGNIFICANT CHANGE UP (ref 0–0.2)
BASOPHILS # BLD AUTO: 0.04 K/UL — SIGNIFICANT CHANGE UP (ref 0–0.2)
BASOPHILS # BLD AUTO: 0.06 K/UL — SIGNIFICANT CHANGE UP (ref 0–0.2)
BASOPHILS NFR BLD AUTO: 0.5 % — SIGNIFICANT CHANGE UP (ref 0–2)
BASOPHILS NFR BLD AUTO: 0.5 % — SIGNIFICANT CHANGE UP (ref 0–2)
BASOPHILS NFR BLD AUTO: 0.6 % — SIGNIFICANT CHANGE UP (ref 0–2)
BASOPHILS NFR BLD AUTO: 0.8 % — SIGNIFICANT CHANGE UP (ref 0–2)
BASOPHILS NFR BLD AUTO: 0.8 % — SIGNIFICANT CHANGE UP (ref 0–2)
BASOPHILS NFR BLD AUTO: 0.9 % — SIGNIFICANT CHANGE UP (ref 0–2)
BASOPHILS NFR BLD AUTO: 0.9 % — SIGNIFICANT CHANGE UP (ref 0–2)
BASOPHILS NFR BLD AUTO: 1.4 % — SIGNIFICANT CHANGE UP (ref 0–2)
BILIRUB SERPL-MCNC: 0.7 MG/DL — SIGNIFICANT CHANGE UP (ref 0.2–1.2)
BUN SERPL-MCNC: 25 MG/DL — HIGH (ref 7–23)
CALCIUM SERPL-MCNC: 9.7 MG/DL — SIGNIFICANT CHANGE UP (ref 8.4–10.5)
CHLORIDE SERPL-SCNC: 103 MMOL/L — SIGNIFICANT CHANGE UP (ref 96–108)
CO2 SERPL-SCNC: 27 MMOL/L — SIGNIFICANT CHANGE UP (ref 22–31)
CREAT SERPL-MCNC: 0.6 MG/DL — SIGNIFICANT CHANGE UP (ref 0.5–1.3)
EGFR: 103 ML/MIN/1.73M2 — SIGNIFICANT CHANGE UP
EOSINOPHIL # BLD AUTO: 0.16 K/UL — SIGNIFICANT CHANGE UP (ref 0–0.5)
EOSINOPHIL # BLD AUTO: 0.22 K/UL — SIGNIFICANT CHANGE UP (ref 0–0.5)
EOSINOPHIL # BLD AUTO: 0.23 K/UL — SIGNIFICANT CHANGE UP (ref 0–0.5)
EOSINOPHIL # BLD AUTO: 0.24 K/UL — SIGNIFICANT CHANGE UP (ref 0–0.5)
EOSINOPHIL # BLD AUTO: 0.24 K/UL — SIGNIFICANT CHANGE UP (ref 0–0.5)
EOSINOPHIL # BLD AUTO: 0.37 K/UL — SIGNIFICANT CHANGE UP (ref 0–0.5)
EOSINOPHIL # BLD AUTO: 0.45 K/UL — SIGNIFICANT CHANGE UP (ref 0–0.5)
EOSINOPHIL # BLD AUTO: 0.94 K/UL — HIGH (ref 0–0.5)
EOSINOPHIL # BLD AUTO: 1.27 K/UL — HIGH (ref 0–0.5)
EOSINOPHIL # BLD AUTO: 1.44 K/UL — HIGH (ref 0–0.5)
EOSINOPHIL # BLD AUTO: 1.78 K/UL — HIGH (ref 0–0.5)
EOSINOPHIL NFR BLD AUTO: 14.2 % — HIGH (ref 0–6)
EOSINOPHIL NFR BLD AUTO: 14.8 % — HIGH (ref 0–6)
EOSINOPHIL NFR BLD AUTO: 19.4 % — HIGH (ref 0–6)
EOSINOPHIL NFR BLD AUTO: 3.3 % — SIGNIFICANT CHANGE UP (ref 0–6)
EOSINOPHIL NFR BLD AUTO: 3.4 % — SIGNIFICANT CHANGE UP (ref 0–6)
EOSINOPHIL NFR BLD AUTO: 3.7 % — SIGNIFICANT CHANGE UP (ref 0–6)
EOSINOPHIL NFR BLD AUTO: 4.5 % — SIGNIFICANT CHANGE UP (ref 0–6)
EOSINOPHIL NFR BLD AUTO: 4.7 % — SIGNIFICANT CHANGE UP (ref 0–6)
EOSINOPHIL NFR BLD AUTO: 41.4 % — HIGH (ref 0–6)
EOSINOPHIL NFR BLD AUTO: 6.1 % — HIGH (ref 0–6)
EOSINOPHIL NFR BLD AUTO: 8.7 % — HIGH (ref 0–6)
ESTIMATED AVERAGE GLUCOSE: 117 MG/DL — HIGH (ref 68–114)
GLUCOSE SERPL-MCNC: 113 MG/DL — HIGH (ref 70–99)
HCT VFR BLD CALC: 24.6 % — LOW (ref 39–50)
HCT VFR BLD CALC: 25 % — LOW (ref 39–50)
HCT VFR BLD CALC: 27.4 % — LOW (ref 39–50)
HCT VFR BLD CALC: 31.6 % — LOW (ref 39–50)
HCT VFR BLD CALC: 34.6 % — LOW (ref 39–50)
HCT VFR BLD CALC: 34.8 % — LOW (ref 39–50)
HCT VFR BLD CALC: 36.4 % — LOW (ref 39–50)
HCT VFR BLD CALC: 37.7 % — LOW (ref 39–50)
HCT VFR BLD CALC: 38.2 % — LOW (ref 39–50)
HCT VFR BLD CALC: 39 % — SIGNIFICANT CHANGE UP (ref 39–50)
HCT VFR BLD CALC: 39.9 % — SIGNIFICANT CHANGE UP (ref 39–50)
HCT VFR BLD CALC: 41.1 % — SIGNIFICANT CHANGE UP (ref 39–50)
HGB BLD-MCNC: 10.3 G/DL — LOW (ref 13–17)
HGB BLD-MCNC: 11.7 G/DL — LOW (ref 13–17)
HGB BLD-MCNC: 12 G/DL — LOW (ref 13–17)
HGB BLD-MCNC: 12.1 G/DL — LOW (ref 13–17)
HGB BLD-MCNC: 13 G/DL — SIGNIFICANT CHANGE UP (ref 13–17)
HGB BLD-MCNC: 13.2 G/DL — SIGNIFICANT CHANGE UP (ref 13–17)
HGB BLD-MCNC: 13.4 G/DL — SIGNIFICANT CHANGE UP (ref 13–17)
HGB BLD-MCNC: 14 G/DL — SIGNIFICANT CHANGE UP (ref 13–17)
HGB BLD-MCNC: 14.2 G/DL — SIGNIFICANT CHANGE UP (ref 13–17)
HGB BLD-MCNC: 8.1 G/DL — LOW (ref 13–17)
HGB BLD-MCNC: 8.3 G/DL — LOW (ref 13–17)
HGB BLD-MCNC: 8.8 G/DL — LOW (ref 13–17)
IMM GRANULOCYTES NFR BLD AUTO: 0.2 % — SIGNIFICANT CHANGE UP (ref 0–1.5)
IMM GRANULOCYTES NFR BLD AUTO: 0.2 % — SIGNIFICANT CHANGE UP (ref 0–1.5)
IMM GRANULOCYTES NFR BLD AUTO: 0.3 % — SIGNIFICANT CHANGE UP (ref 0–1.5)
IMM GRANULOCYTES NFR BLD AUTO: 0.3 % — SIGNIFICANT CHANGE UP (ref 0–1.5)
IMM GRANULOCYTES NFR BLD AUTO: 0.4 % — SIGNIFICANT CHANGE UP (ref 0–1.5)
IMM GRANULOCYTES NFR BLD AUTO: 0.5 % — SIGNIFICANT CHANGE UP (ref 0–1.5)
IMM GRANULOCYTES NFR BLD AUTO: 0.6 % — SIGNIFICANT CHANGE UP (ref 0–1.5)
IMM GRANULOCYTES NFR BLD AUTO: 0.7 % — SIGNIFICANT CHANGE UP (ref 0–1.5)
IMM GRANULOCYTES NFR BLD AUTO: 0.8 % — SIGNIFICANT CHANGE UP (ref 0–1.5)
IMM GRANULOCYTES NFR BLD AUTO: 0.9 % — SIGNIFICANT CHANGE UP (ref 0–1.5)
IMM GRANULOCYTES NFR BLD AUTO: 1.1 % — SIGNIFICANT CHANGE UP (ref 0–1.5)
LYMPHOCYTES # BLD AUTO: 0.46 K/UL — LOW (ref 1–3.3)
LYMPHOCYTES # BLD AUTO: 0.48 K/UL — LOW (ref 1–3.3)
LYMPHOCYTES # BLD AUTO: 0.6 K/UL — LOW (ref 1–3.3)
LYMPHOCYTES # BLD AUTO: 0.61 K/UL — LOW (ref 1–3.3)
LYMPHOCYTES # BLD AUTO: 0.62 K/UL — LOW (ref 1–3.3)
LYMPHOCYTES # BLD AUTO: 0.7 K/UL — LOW (ref 1–3.3)
LYMPHOCYTES # BLD AUTO: 0.75 K/UL — LOW (ref 1–3.3)
LYMPHOCYTES # BLD AUTO: 0.76 K/UL — LOW (ref 1–3.3)
LYMPHOCYTES # BLD AUTO: 0.78 K/UL — LOW (ref 1–3.3)
LYMPHOCYTES # BLD AUTO: 0.85 K/UL — LOW (ref 1–3.3)
LYMPHOCYTES # BLD AUTO: 1.14 K/UL — SIGNIFICANT CHANGE UP (ref 1–3.3)
LYMPHOCYTES # BLD AUTO: 10.7 % — LOW (ref 13–44)
LYMPHOCYTES # BLD AUTO: 10.7 % — LOW (ref 13–44)
LYMPHOCYTES # BLD AUTO: 11 % — LOW (ref 13–44)
LYMPHOCYTES # BLD AUTO: 11.6 % — LOW (ref 13–44)
LYMPHOCYTES # BLD AUTO: 12 % — LOW (ref 13–44)
LYMPHOCYTES # BLD AUTO: 13.4 % — SIGNIFICANT CHANGE UP (ref 13–44)
LYMPHOCYTES # BLD AUTO: 17.3 % — SIGNIFICANT CHANGE UP (ref 13–44)
LYMPHOCYTES # BLD AUTO: 23.8 % — SIGNIFICANT CHANGE UP (ref 13–44)
LYMPHOCYTES # BLD AUTO: 6 % — LOW (ref 13–44)
LYMPHOCYTES # BLD AUTO: 9 % — LOW (ref 13–44)
LYMPHOCYTES # BLD AUTO: 9.7 % — LOW (ref 13–44)
MCHC RBC-ENTMCNC: 32.1 G/DL — SIGNIFICANT CHANGE UP (ref 32–36)
MCHC RBC-ENTMCNC: 32.6 G/DL — SIGNIFICANT CHANGE UP (ref 32–36)
MCHC RBC-ENTMCNC: 32.9 G/DL — SIGNIFICANT CHANGE UP (ref 32–36)
MCHC RBC-ENTMCNC: 32.9 PG — SIGNIFICANT CHANGE UP (ref 27–34)
MCHC RBC-ENTMCNC: 33.2 G/DL — SIGNIFICANT CHANGE UP (ref 32–36)
MCHC RBC-ENTMCNC: 33.2 G/DL — SIGNIFICANT CHANGE UP (ref 32–36)
MCHC RBC-ENTMCNC: 33.6 G/DL — SIGNIFICANT CHANGE UP (ref 32–36)
MCHC RBC-ENTMCNC: 33.8 G/DL — SIGNIFICANT CHANGE UP (ref 32–36)
MCHC RBC-ENTMCNC: 34 PG — SIGNIFICANT CHANGE UP (ref 27–34)
MCHC RBC-ENTMCNC: 34.3 PG — HIGH (ref 27–34)
MCHC RBC-ENTMCNC: 34.4 PG — HIGH (ref 27–34)
MCHC RBC-ENTMCNC: 34.5 G/DL — SIGNIFICANT CHANGE UP (ref 32–36)
MCHC RBC-ENTMCNC: 34.5 G/DL — SIGNIFICANT CHANGE UP (ref 32–36)
MCHC RBC-ENTMCNC: 34.7 G/DL — SIGNIFICANT CHANGE UP (ref 32–36)
MCHC RBC-ENTMCNC: 34.8 PG — HIGH (ref 27–34)
MCHC RBC-ENTMCNC: 35.1 G/DL — SIGNIFICANT CHANGE UP (ref 32–36)
MCHC RBC-ENTMCNC: 35.1 G/DL — SIGNIFICANT CHANGE UP (ref 32–36)
MCHC RBC-ENTMCNC: 35.4 PG — HIGH (ref 27–34)
MCHC RBC-ENTMCNC: 35.7 PG — HIGH (ref 27–34)
MCHC RBC-ENTMCNC: 35.9 PG — HIGH (ref 27–34)
MCHC RBC-ENTMCNC: 36 PG — HIGH (ref 27–34)
MCHC RBC-ENTMCNC: 36.2 PG — HIGH (ref 27–34)
MCHC RBC-ENTMCNC: 36.4 PG — HIGH (ref 27–34)
MCHC RBC-ENTMCNC: 36.7 PG — HIGH (ref 27–34)
MCV RBC AUTO: 100.8 FL — HIGH (ref 80–100)
MCV RBC AUTO: 101.3 FL — HIGH (ref 80–100)
MCV RBC AUTO: 102.1 FL — HIGH (ref 80–100)
MCV RBC AUTO: 107.1 FL — HIGH (ref 80–100)
MCV RBC AUTO: 108.4 FL — HIGH (ref 80–100)
MCV RBC AUTO: 109.6 FL — HIGH (ref 80–100)
MCV RBC AUTO: 110.1 FL — HIGH (ref 80–100)
MCV RBC AUTO: 110.6 FL — HIGH (ref 80–100)
MCV RBC AUTO: 112.8 FL — HIGH (ref 80–100)
MCV RBC AUTO: 95.4 FL — SIGNIFICANT CHANGE UP (ref 80–100)
MCV RBC AUTO: 96.8 FL — SIGNIFICANT CHANGE UP (ref 80–100)
MCV RBC AUTO: 97.9 FL — SIGNIFICANT CHANGE UP (ref 80–100)
MONOCYTES # BLD AUTO: 0.38 K/UL — SIGNIFICANT CHANGE UP (ref 0–0.9)
MONOCYTES # BLD AUTO: 0.42 K/UL — SIGNIFICANT CHANGE UP (ref 0–0.9)
MONOCYTES # BLD AUTO: 0.48 K/UL — SIGNIFICANT CHANGE UP (ref 0–0.9)
MONOCYTES # BLD AUTO: 0.49 K/UL — SIGNIFICANT CHANGE UP (ref 0–0.9)
MONOCYTES # BLD AUTO: 0.53 K/UL — SIGNIFICANT CHANGE UP (ref 0–0.9)
MONOCYTES # BLD AUTO: 0.56 K/UL — SIGNIFICANT CHANGE UP (ref 0–0.9)
MONOCYTES # BLD AUTO: 0.62 K/UL — SIGNIFICANT CHANGE UP (ref 0–0.9)
MONOCYTES # BLD AUTO: 0.65 K/UL — SIGNIFICANT CHANGE UP (ref 0–0.9)
MONOCYTES # BLD AUTO: 0.67 K/UL — SIGNIFICANT CHANGE UP (ref 0–0.9)
MONOCYTES # BLD AUTO: 0.7 K/UL — SIGNIFICANT CHANGE UP (ref 0–0.9)
MONOCYTES # BLD AUTO: 0.73 K/UL — SIGNIFICANT CHANGE UP (ref 0–0.9)
MONOCYTES NFR BLD AUTO: 10.2 % — SIGNIFICANT CHANGE UP (ref 2–14)
MONOCYTES NFR BLD AUTO: 10.6 % — SIGNIFICANT CHANGE UP (ref 2–14)
MONOCYTES NFR BLD AUTO: 13.4 % — SIGNIFICANT CHANGE UP (ref 2–14)
MONOCYTES NFR BLD AUTO: 14.4 % — HIGH (ref 2–14)
MONOCYTES NFR BLD AUTO: 6.9 % — SIGNIFICANT CHANGE UP (ref 2–14)
MONOCYTES NFR BLD AUTO: 7.8 % — SIGNIFICANT CHANGE UP (ref 2–14)
MONOCYTES NFR BLD AUTO: 7.9 % — SIGNIFICANT CHANGE UP (ref 2–14)
MONOCYTES NFR BLD AUTO: 9.3 % — SIGNIFICANT CHANGE UP (ref 2–14)
MONOCYTES NFR BLD AUTO: 9.9 % — SIGNIFICANT CHANGE UP (ref 2–14)
NEUTROPHILS # BLD AUTO: 1.37 K/UL — LOW (ref 1.8–7.4)
NEUTROPHILS # BLD AUTO: 2.21 K/UL — SIGNIFICANT CHANGE UP (ref 1.8–7.4)
NEUTROPHILS # BLD AUTO: 3.05 K/UL — SIGNIFICANT CHANGE UP (ref 1.8–7.4)
NEUTROPHILS # BLD AUTO: 3.54 K/UL — SIGNIFICANT CHANGE UP (ref 1.8–7.4)
NEUTROPHILS # BLD AUTO: 3.75 K/UL — SIGNIFICANT CHANGE UP (ref 1.8–7.4)
NEUTROPHILS # BLD AUTO: 3.85 K/UL — SIGNIFICANT CHANGE UP (ref 1.8–7.4)
NEUTROPHILS # BLD AUTO: 4.13 K/UL — SIGNIFICANT CHANGE UP (ref 1.8–7.4)
NEUTROPHILS # BLD AUTO: 4.72 K/UL — SIGNIFICANT CHANGE UP (ref 1.8–7.4)
NEUTROPHILS # BLD AUTO: 5.39 K/UL — SIGNIFICANT CHANGE UP (ref 1.8–7.4)
NEUTROPHILS # BLD AUTO: 5.94 K/UL — SIGNIFICANT CHANGE UP (ref 1.8–7.4)
NEUTROPHILS # BLD AUTO: 7.24 K/UL — SIGNIFICANT CHANGE UP (ref 1.8–7.4)
NEUTROPHILS NFR BLD AUTO: 31.9 % — LOW (ref 43–77)
NEUTROPHILS NFR BLD AUTO: 58.8 % — SIGNIFICANT CHANGE UP (ref 43–77)
NEUTROPHILS NFR BLD AUTO: 59.4 % — SIGNIFICANT CHANGE UP (ref 43–77)
NEUTROPHILS NFR BLD AUTO: 61.5 % — SIGNIFICANT CHANGE UP (ref 43–77)
NEUTROPHILS NFR BLD AUTO: 63.6 % — SIGNIFICANT CHANGE UP (ref 43–77)
NEUTROPHILS NFR BLD AUTO: 68.3 % — SIGNIFICANT CHANGE UP (ref 43–77)
NEUTROPHILS NFR BLD AUTO: 71.7 % — SIGNIFICANT CHANGE UP (ref 43–77)
NEUTROPHILS NFR BLD AUTO: 75.5 % — SIGNIFICANT CHANGE UP (ref 43–77)
NEUTROPHILS NFR BLD AUTO: 75.5 % — SIGNIFICANT CHANGE UP (ref 43–77)
NEUTROPHILS NFR BLD AUTO: 76.4 % — SIGNIFICANT CHANGE UP (ref 43–77)
NEUTROPHILS NFR BLD AUTO: 77.6 % — HIGH (ref 43–77)
NRBC # BLD: 0 /100 WBCS — SIGNIFICANT CHANGE UP (ref 0–0)
PLATELET # BLD AUTO: 112 K/UL — LOW (ref 150–400)
PLATELET # BLD AUTO: 127 K/UL — LOW (ref 150–400)
PLATELET # BLD AUTO: 128 K/UL — LOW (ref 150–400)
PLATELET # BLD AUTO: 134 K/UL — LOW (ref 150–400)
PLATELET # BLD AUTO: 140 K/UL — LOW (ref 150–400)
PLATELET # BLD AUTO: 151 K/UL — SIGNIFICANT CHANGE UP (ref 150–400)
PLATELET # BLD AUTO: 151 K/UL — SIGNIFICANT CHANGE UP (ref 150–400)
PLATELET # BLD AUTO: 167 K/UL — SIGNIFICANT CHANGE UP (ref 150–400)
PLATELET # BLD AUTO: 168 K/UL — SIGNIFICANT CHANGE UP (ref 150–400)
PLATELET # BLD AUTO: 172 K/UL — SIGNIFICANT CHANGE UP (ref 150–400)
PLATELET # BLD AUTO: 183 K/UL — SIGNIFICANT CHANGE UP (ref 150–400)
PLATELET # BLD AUTO: 187 K/UL — SIGNIFICANT CHANGE UP (ref 150–400)
POTASSIUM SERPL-MCNC: 4.2 MMOL/L — SIGNIFICANT CHANGE UP (ref 3.5–5.3)
POTASSIUM SERPL-SCNC: 4.2 MMOL/L — SIGNIFICANT CHANGE UP (ref 3.5–5.3)
PROT SERPL-MCNC: 6.4 G/DL — SIGNIFICANT CHANGE UP (ref 6–8.3)
RBC # BLD: 2.26 M/UL — LOW (ref 4.2–5.8)
RBC # BLD: 2.27 M/UL — LOW (ref 4.2–5.8)
RBC # BLD: 2.43 M/UL — LOW (ref 4.2–5.8)
RBC # BLD: 2.87 M/UL — LOW (ref 4.2–5.8)
RBC # BLD: 3.25 M/UL — LOW (ref 4.2–5.8)
RBC # BLD: 3.32 M/UL — LOW (ref 4.2–5.8)
RBC # BLD: 3.39 M/UL — LOW (ref 4.2–5.8)
RBC # BLD: 3.74 M/UL — LOW (ref 4.2–5.8)
RBC # BLD: 3.85 M/UL — LOW (ref 4.2–5.8)
RBC # BLD: 3.9 M/UL — LOW (ref 4.2–5.8)
RBC # BLD: 4.12 M/UL — LOW (ref 4.2–5.8)
RBC # BLD: 4.31 M/UL — SIGNIFICANT CHANGE UP (ref 4.2–5.8)
RBC # FLD: 12.9 % — SIGNIFICANT CHANGE UP (ref 10.3–14.5)
RBC # FLD: 13.1 % — SIGNIFICANT CHANGE UP (ref 10.3–14.5)
RBC # FLD: 13.2 % — SIGNIFICANT CHANGE UP (ref 10.3–14.5)
RBC # FLD: 13.2 % — SIGNIFICANT CHANGE UP (ref 10.3–14.5)
RBC # FLD: 13.3 % — SIGNIFICANT CHANGE UP (ref 10.3–14.5)
RBC # FLD: 13.3 % — SIGNIFICANT CHANGE UP (ref 10.3–14.5)
RBC # FLD: 13.6 % — SIGNIFICANT CHANGE UP (ref 10.3–14.5)
RBC # FLD: 14.6 % — HIGH (ref 10.3–14.5)
RBC # FLD: 17.8 % — HIGH (ref 10.3–14.5)
RBC # FLD: 20.2 % — HIGH (ref 10.3–14.5)
RBC # FLD: 22 % — HIGH (ref 10.3–14.5)
RBC # FLD: 22.2 % — HIGH (ref 10.3–14.5)
SARS-COV-2 N GENE NPH QL NAA+PROBE: NOT DETECTED
SODIUM SERPL-SCNC: 142 MMOL/L — SIGNIFICANT CHANGE UP (ref 135–145)
WBC # BLD: 10.11 K/UL — SIGNIFICANT CHANGE UP (ref 3.8–10.5)
WBC # BLD: 3.59 K/UL — LOW (ref 3.8–10.5)
WBC # BLD: 4.3 K/UL — SIGNIFICANT CHANGE UP (ref 3.8–10.5)
WBC # BLD: 4.8 K/UL — SIGNIFICANT CHANGE UP (ref 3.8–10.5)
WBC # BLD: 5.18 K/UL — SIGNIFICANT CHANGE UP (ref 3.8–10.5)
WBC # BLD: 5.32 K/UL — SIGNIFICANT CHANGE UP (ref 3.8–10.5)
WBC # BLD: 5.6 K/UL — SIGNIFICANT CHANGE UP (ref 3.8–10.5)
WBC # BLD: 6.25 K/UL — SIGNIFICANT CHANGE UP (ref 3.8–10.5)
WBC # BLD: 6.33 K/UL — SIGNIFICANT CHANGE UP (ref 3.8–10.5)
WBC # BLD: 6.55 K/UL — SIGNIFICANT CHANGE UP (ref 3.8–10.5)
WBC # BLD: 7.06 K/UL — SIGNIFICANT CHANGE UP (ref 3.8–10.5)
WBC # BLD: 7.86 K/UL — SIGNIFICANT CHANGE UP (ref 3.8–10.5)
WBC # FLD AUTO: 10.11 K/UL — SIGNIFICANT CHANGE UP (ref 3.8–10.5)
WBC # FLD AUTO: 3.59 K/UL — LOW (ref 3.8–10.5)
WBC # FLD AUTO: 4.3 K/UL — SIGNIFICANT CHANGE UP (ref 3.8–10.5)
WBC # FLD AUTO: 4.8 K/UL — SIGNIFICANT CHANGE UP (ref 3.8–10.5)
WBC # FLD AUTO: 5.18 K/UL — SIGNIFICANT CHANGE UP (ref 3.8–10.5)
WBC # FLD AUTO: 5.32 K/UL — SIGNIFICANT CHANGE UP (ref 3.8–10.5)
WBC # FLD AUTO: 5.6 K/UL — SIGNIFICANT CHANGE UP (ref 3.8–10.5)
WBC # FLD AUTO: 6.25 K/UL — SIGNIFICANT CHANGE UP (ref 3.8–10.5)
WBC # FLD AUTO: 6.33 K/UL — SIGNIFICANT CHANGE UP (ref 3.8–10.5)
WBC # FLD AUTO: 6.55 K/UL — SIGNIFICANT CHANGE UP (ref 3.8–10.5)
WBC # FLD AUTO: 7.06 K/UL — SIGNIFICANT CHANGE UP (ref 3.8–10.5)
WBC # FLD AUTO: 7.86 K/UL — SIGNIFICANT CHANGE UP (ref 3.8–10.5)

## 2022-01-01 PROCEDURE — 99213 OFFICE O/P EST LOW 20 MIN: CPT

## 2022-01-01 PROCEDURE — 93306 TTE W/DOPPLER COMPLETE: CPT

## 2022-01-01 PROCEDURE — 71250 CT THORAX DX C-: CPT | Mod: MH

## 2022-01-01 PROCEDURE — 94729 DIFFUSING CAPACITY: CPT

## 2022-01-01 PROCEDURE — 93040 RHYTHM ECG WITH REPORT: CPT | Mod: 59

## 2022-01-01 PROCEDURE — 99205 OFFICE O/P NEW HI 60 MIN: CPT | Mod: GC

## 2022-01-01 PROCEDURE — 99214 OFFICE O/P EST MOD 30 MIN: CPT

## 2022-01-01 PROCEDURE — 94010 BREATHING CAPACITY TEST: CPT

## 2022-01-01 PROCEDURE — 77080 DXA BONE DENSITY AXIAL: CPT

## 2022-01-01 PROCEDURE — 71250 CT THORAX DX C-: CPT

## 2022-01-01 PROCEDURE — 93000 ELECTROCARDIOGRAM COMPLETE: CPT

## 2022-01-01 PROCEDURE — 76705 ECHO EXAM OF ABDOMEN: CPT

## 2022-01-01 PROCEDURE — 92015 DETERMINE REFRACTIVE STATE: CPT

## 2022-01-01 PROCEDURE — 71275 CT ANGIOGRAPHY CHEST: CPT | Mod: 26,MH

## 2022-01-01 PROCEDURE — 99204 OFFICE O/P NEW MOD 45 MIN: CPT

## 2022-01-01 PROCEDURE — 71046 X-RAY EXAM CHEST 2 VIEWS: CPT | Mod: 26

## 2022-01-01 PROCEDURE — 94726 PLETHYSMOGRAPHY LUNG VOLUMES: CPT

## 2022-01-01 PROCEDURE — 77080 DXA BONE DENSITY AXIAL: CPT | Mod: 26

## 2022-01-01 PROCEDURE — 71250 CT THORAX DX C-: CPT | Mod: 26,MH,59

## 2022-01-01 PROCEDURE — 99215 OFFICE O/P EST HI 40 MIN: CPT

## 2022-01-01 PROCEDURE — 76705 ECHO EXAM OF ABDOMEN: CPT | Mod: 26

## 2022-01-01 PROCEDURE — 92014 COMPRE OPH EXAM EST PT 1/>: CPT

## 2022-01-01 PROCEDURE — 71046 X-RAY EXAM CHEST 2 VIEWS: CPT

## 2022-01-01 PROCEDURE — 71275 CT ANGIOGRAPHY CHEST: CPT

## 2022-01-01 PROCEDURE — 71250 CT THORAX DX C-: CPT | Mod: 26,MH

## 2022-01-01 PROCEDURE — 94618 PULMONARY STRESS TESTING: CPT

## 2022-01-01 RX ORDER — PREDNISONE 20 MG/1
20 TABLET ORAL TWICE DAILY
Qty: 60 | Refills: 0 | Status: ACTIVE | COMMUNITY
Start: 2022-01-01

## 2022-01-01 RX ORDER — AMLODIPINE BESYLATE 2.5 MG/1
2.5 TABLET ORAL DAILY
Qty: 90 | Refills: 3 | Status: DISCONTINUED | COMMUNITY
Start: 2022-01-01 | End: 2022-01-01

## 2022-01-01 RX ORDER — CYCLOBENZAPRINE HYDROCHLORIDE 10 MG/1
10 TABLET, FILM COATED ORAL TWICE DAILY
Qty: 60 | Refills: 3 | Status: DISCONTINUED | COMMUNITY
Start: 2022-01-01 | End: 2022-01-01

## 2022-01-01 RX ORDER — ASPIRIN 81 MG/1
81 TABLET, CHEWABLE ORAL DAILY
Refills: 0 | Status: DISCONTINUED | COMMUNITY
Start: 2022-01-01 | End: 2022-01-01

## 2022-01-01 RX ORDER — OMEPRAZOLE MAGNESIUM 20 MG/1
20 TABLET, DELAYED RELEASE ORAL DAILY
Qty: 90 | Refills: 0 | Status: DISCONTINUED | COMMUNITY
Start: 2022-01-01 | End: 2022-01-01

## 2022-01-01 RX ORDER — AMLODIPINE BESYLATE 5 MG/1
5 TABLET ORAL
Qty: 90 | Refills: 1 | Status: ACTIVE | COMMUNITY
Start: 2022-01-01

## 2022-01-01 RX ORDER — TADALAFIL 10 MG/1
10 TABLET, FILM COATED ORAL
Refills: 0 | Status: ACTIVE | COMMUNITY

## 2022-01-01 RX ORDER — FLASH GLUCOSE SCANNING READER
EACH MISCELLANEOUS
Qty: 1 | Refills: 0 | Status: ACTIVE | COMMUNITY
Start: 2022-01-01 | End: 1900-01-01

## 2022-01-01 RX ORDER — URSODIOL 300 MG/1
300 CAPSULE ORAL
Refills: 0 | Status: DISCONTINUED | COMMUNITY
Start: 2022-01-01 | End: 2022-01-01

## 2022-01-01 RX ORDER — DEXAMETHASONE INTENSOL 1 MG/ML
1 SOLUTION, CONCENTRATE ORAL
Refills: 0 | Status: DISCONTINUED | COMMUNITY
Start: 2022-01-01 | End: 2022-01-01

## 2022-01-01 RX ORDER — POSACONAZOLE 100 MG/1
100 TABLET, DELAYED RELEASE ORAL
Refills: 0 | Status: DISCONTINUED | COMMUNITY
Start: 2022-01-01 | End: 2022-01-01

## 2022-01-01 RX ORDER — MAGNESIUM OXIDE 241.3 MG/1000MG
400 TABLET ORAL
Refills: 0 | Status: DISCONTINUED | COMMUNITY
Start: 2022-01-01 | End: 2022-01-01

## 2022-01-01 RX ORDER — LORATADINE 10 MG/1
10 TABLET ORAL
Qty: 90 | Refills: 3 | Status: DISCONTINUED | COMMUNITY
End: 2022-01-01

## 2022-01-01 RX ORDER — ROSUVASTATIN CALCIUM 20 MG/1
20 TABLET, FILM COATED ORAL
Qty: 90 | Refills: 3 | Status: DISCONTINUED | COMMUNITY
Start: 2020-07-07 | End: 2022-01-01

## 2022-01-01 RX ORDER — COLCHICINE 0.6 MG/1
0.6 TABLET ORAL
Refills: 0 | Status: DISCONTINUED | COMMUNITY
End: 2022-01-01

## 2022-01-01 RX ORDER — QUININE SULFATE 324 MG/1
324 CAPSULE ORAL
Refills: 0 | Status: DISCONTINUED | COMMUNITY
Start: 2022-01-01 | End: 2022-01-01

## 2022-01-01 RX ORDER — MONTELUKAST 10 MG/1
10 TABLET, FILM COATED ORAL DAILY
Qty: 30 | Refills: 0 | Status: ACTIVE | COMMUNITY
Start: 2022-01-01

## 2022-01-01 RX ORDER — CYCLOBENZAPRINE HYDROCHLORIDE 10 MG/1
10 TABLET, FILM COATED ORAL TWICE DAILY
Qty: 60 | Refills: 0 | Status: ACTIVE | COMMUNITY
Start: 2022-01-01 | End: 1900-01-01

## 2022-01-01 RX ORDER — CYCLOBENZAPRINE HYDROCHLORIDE 7.5 MG/1
TABLET, FILM COATED ORAL 3 TIMES DAILY
Refills: 0 | Status: DISCONTINUED | COMMUNITY
End: 2022-01-01

## 2022-01-01 RX ORDER — CYCLOBENZAPRINE HYDROCHLORIDE 10 MG/1
10 TABLET, FILM COATED ORAL 3 TIMES DAILY
Qty: 90 | Refills: 0 | Status: DISCONTINUED | COMMUNITY
Start: 2022-01-01 | End: 2022-01-01

## 2022-01-01 RX ORDER — STANDARDIZED SENNA CONCENTRATE 8.6 MG/1
8.6 TABLET ORAL
Refills: 0 | Status: DISCONTINUED | COMMUNITY
Start: 2022-01-01 | End: 2022-01-01

## 2022-01-01 RX ORDER — ASPIRIN 81 MG
81 TABLET, DELAYED RELEASE (ENTERIC COATED) ORAL DAILY
Refills: 0 | Status: ACTIVE | COMMUNITY

## 2022-01-01 RX ORDER — METOCLOPRAMIDE 10 MG/1
10 TABLET ORAL EVERY 6 HOURS
Qty: 30 | Refills: 5 | Status: DISCONTINUED | COMMUNITY
Start: 2020-10-09 | End: 2022-01-01

## 2022-01-01 RX ORDER — VALSARTAN 40 MG/1
40 TABLET ORAL
Refills: 0 | Status: DISCONTINUED | COMMUNITY
Start: 2022-01-01 | End: 2022-01-01

## 2022-01-01 RX ORDER — POSACONAZOLE 100 MG/1
100 TABLET, DELAYED RELEASE ORAL DAILY
Qty: 90 | Refills: 0 | Status: ACTIVE | COMMUNITY
Start: 2022-01-01

## 2022-01-01 RX ORDER — PYRITHIONE ZINC 1 G/ML
LOTION/SHAMPOO TOPICAL
Refills: 0 | Status: ACTIVE | COMMUNITY
Start: 2022-01-01

## 2022-01-01 RX ORDER — METOPROLOL SUCCINATE 100 MG/1
100 TABLET, EXTENDED RELEASE ORAL DAILY
Qty: 90 | Refills: 0 | Status: ACTIVE | COMMUNITY

## 2022-01-01 RX ORDER — FLASH GLUCOSE SENSOR
KIT MISCELLANEOUS
Qty: 2 | Refills: 2 | Status: ACTIVE | COMMUNITY
Start: 2022-01-01 | End: 1900-01-01

## 2022-01-01 RX ORDER — BELUMOSUDIL 200 MG/1
200 TABLET ORAL TWICE DAILY
Qty: 60 | Refills: 0 | Status: ACTIVE | COMMUNITY
Start: 2022-01-01

## 2022-01-01 RX ORDER — BEBTELOVIMAB 87.5 MG/ML
175 INJECTION, SOLUTION INTRAVENOUS ONCE
Refills: 0 | Status: COMPLETED | OUTPATIENT
Start: 2022-01-01 | End: 2022-01-01

## 2022-01-01 RX ORDER — ATOVAQUONE 750 MG/5ML
750 SUSPENSION ORAL DAILY
Qty: 250 | Refills: 0 | Status: ACTIVE | COMMUNITY
Start: 2022-01-01

## 2022-01-01 RX ORDER — ROSUVASTATIN CALCIUM 10 MG/1
10 TABLET, FILM COATED ORAL DAILY
Qty: 90 | Refills: 3 | Status: DISCONTINUED | COMMUNITY
Start: 2022-01-01 | End: 2022-01-01

## 2022-01-01 RX ORDER — AZITHROMYCIN 250 MG/1
250 TABLET, FILM COATED ORAL
Qty: 15 | Refills: 0 | Status: ACTIVE | COMMUNITY
Start: 2022-01-01

## 2022-01-01 RX ORDER — LOPERAMIDE HYDROCHLORIDE 2 MG/1
2 TABLET ORAL
Refills: 0 | Status: DISCONTINUED | COMMUNITY
Start: 2022-01-01 | End: 2022-01-01

## 2022-01-01 RX ORDER — RUXOLITINIB 5 MG/1
5 TABLET ORAL
Qty: 60 | Refills: 3 | Status: ACTIVE | COMMUNITY
Start: 2022-01-01

## 2022-01-01 RX ORDER — DEXAMETHASONE 0.5 MG/5ML
0.5 SOLUTION ORAL
Refills: 0 | Status: ACTIVE | COMMUNITY
Start: 2022-01-01

## 2022-01-01 RX ORDER — FOLIC ACID 1 MG/1
1 TABLET ORAL
Qty: 90 | Refills: 3 | Status: ACTIVE | COMMUNITY
Start: 2022-01-01

## 2022-01-01 RX ORDER — FLUTICASONE PROPIONATE 220 UG/1
220 AEROSOL, METERED RESPIRATORY (INHALATION) TWICE DAILY
Qty: 2 | Refills: 0 | Status: ACTIVE | COMMUNITY
Start: 2022-01-01

## 2022-01-01 RX ORDER — RUXOLITINIB 10 MG/1
10 TABLET ORAL
Qty: 180 | Refills: 0 | Status: DISCONTINUED | COMMUNITY
Start: 2020-11-06 | End: 2022-01-01

## 2022-01-01 RX ORDER — ACYCLOVIR 800 MG/1
800 TABLET ORAL
Refills: 0 | Status: ACTIVE | COMMUNITY
Start: 2022-01-01

## 2022-01-01 RX ORDER — OMEPRAZOLE 20 MG/1
20 CAPSULE, DELAYED RELEASE ORAL DAILY
Qty: 30 | Refills: 3 | Status: ACTIVE | COMMUNITY

## 2022-01-01 RX ORDER — METOPROLOL SUCCINATE 50 MG/1
50 TABLET, EXTENDED RELEASE ORAL DAILY
Qty: 90 | Refills: 1 | Status: DISCONTINUED | COMMUNITY
Start: 2020-12-22 | End: 2022-01-01

## 2022-01-01 RX ORDER — ONDANSETRON 8 MG/1
8 TABLET, ORALLY DISINTEGRATING ORAL
Refills: 0 | Status: ACTIVE | COMMUNITY
Start: 2022-01-01

## 2022-01-01 RX ORDER — ACETAMINOPHEN, DEXTROMETHORPHAN HBR, DIPHENPHYDRAMINE HCL, GUAIFENESIN
KIT
Refills: 0 | Status: DISCONTINUED | COMMUNITY
Start: 2022-01-01 | End: 2022-01-01

## 2022-01-01 RX ORDER — BLOOD-GLUCOSE,RECEIVER,CONT
EACH MISCELLANEOUS
Qty: 1 | Refills: 0 | Status: ACTIVE | COMMUNITY
Start: 2022-01-01 | End: 1900-01-01

## 2022-01-01 RX ADMIN — BEBTELOVIMAB 175 MILLIGRAM(S): 87.5 INJECTION, SOLUTION INTRAVENOUS at 09:30

## 2022-01-01 NOTE — ED ADULT TRIAGE NOTE - NS ED NOTE AC HIGH RISK COUNTRIES
PATIENT INFORMATION  Follow-Up    - Follow up on Wednesday for weight check  - Feed every 2-3 hours, wake him up as needed  - Return for your 2 week well child visit.    First [place] Visit - After Visit Summary - Adapted from Bright Futures and the American Academy of Pediatrics    Anticipatory guidance discussed.  Avoid putting to bed with bottle  Call for jaundice, decreased feeding, or fever  Car seat issues, including proper placement  Encouraged that any formula used be iron-fortified  Fluoride supplementation if unfluoridated water supply  Impossible to \"spoil\" infants at this age  Limit daytime sleep to 3-4 hours at a time  Normal crying  Obtain and know how to use thermometer  Place in crib before completely asleep  Safe sleep furniture  Set hot water heater less than 120 degrees F  Sleep face up to decrease chances of SIDS  Smoke detectors and carbon monoxide detectors  Typical  feeding habits  Umbilical cord stump care    Parental and Family Well Being is Important for Infant Health  Call your pediatrician or obstetrician if you feel sad, blue or overwhelmed for more than a few days.  If your baby is crying, and you feel yourself losing control, secure your baby either in a car seat or crib and to go to another room to calm down.  It can be very stressful dealing with a crying infant and please know it is normal to need that few moments to gather your strength and return to the baby.  This is a good time to ask trusted friends and family to hold/soothe the baby while you take some time to yourself.    Never hesitate to ask for help from family and friends.  Give your other children small, safe ways to help you with the baby.  Spend special time alone with each child whenever possible.    Feeding Your Baby  Feed only breast milk or iron-fortified formula, no water or cow’s milk.  Feed when your baby is hungry.  Some cues that show you he/she is hungry include: putting hand to mouth, sucking or  rooting, fussing.    Stop feeding when your baby is full.  Signs that he/she is full include: turning away, closing mouth, relaxing hands.  If breastfeeding: breastfeed 8-12 times per day, make sure your baby has 6-8 wet diapers a day, try to wait until 3-4 weeks of age to use a pacifier to avoid nipple confusion.  Let us know if you would like a referral to a lactation consultant to help with breastfeeding.  If formula feeding: offer your baby 2 ounces every 2-3 hours, more if still hungry.  Hold your baby so you can look at each other while feeding.  Do not prop bottle.      Baby Care  Use a rectal thermometer, not an ear or skin thermometer.    A true fever is when the rectal thermometer reads 100.4 degrees F or higher  In babies 3 months of age and younger, fevers are serious.  Call us if your baby has a temperature of 100.4 or higher.  Have everyone who touches the baby wash their hands first.  Avoid crowds.  Keep your baby out of the sun; use sunscreen only if there is no shade.  Avoid people with colds and flu whenever possible.    Keep your baby’s umbilical cord clean and dry - keep the diaper below the cord until it falls off in 10-14 days and call us if it becomes red, if there is fluid in the area or if it smells.      Getting to Know Your Baby  Get to know each other by holding and touching your baby.  Talk to your baby often.  Let your baby see your face and eyes.  Learn what calms your baby, such as rocking or stroking.    Safety  Put your baby to sleep on his or her back in a safe crib, in your room, and not in your bed.  Either swaddle your baby or use tightly tucked blankets.  Do not use loose, soft bedding or toys in the crib.  Use a crib with slats that are 2 and 3/8 inches apart or less.    Use a rear-facing car safety seat in the middle of the back seat in all vehicles  Never put your baby in a seat with a passenger air bag  Keep a hand on your baby when changing diapers and clothes    Health  and Safety Tips  Parent Education from Healthy Parent    Educación para padres sobre niños sanos    Common dosing for acetaminophen:   Acetaminophen (Tylenol) can be given every 4 hours.  Infant Drops: 160mg/5ml for  Weight 6-11 lbs   Dose 1.25 ml     Additional Educational Resources:  For additional resources regarding your symptoms, diagnosis, or further health information, please visit the Online Health Resources section in [facility].   No

## 2022-02-23 NOTE — HISTORY OF PRESENT ILLNESS
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 Has malaise, low energy and general weakness. Has trouble standing and walking for longer than a block. His whole body begins to shake. Complains of low back pain, which he attributes to his digestive issues with associated abdominal pain. Is constipated, but no diarrhea.  Also has leg swelling, for which he elevates his legs and wears compression socks. Has INGRAM with variable severity depending on energy level and distance. He reports limited ROM in his hands. He is unable to make fists. Will see Rheum. 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 No difficulty swallowing. Being treated with Decadron mouth wash. Appetite good. Weight stable. He notes no fever, night sweats, chest pain, headaches, visual problems, BRBPR, melena. Last transfused in December. Pulmonary function test scheduled on 3/14. \par \par

## 2022-02-23 NOTE — REVIEW OF SYSTEMS
[Fatigue] : fatigue [Lower Ext Edema] : lower extremity edema [SOB on Exertion] : shortness of breath during exertion [Abdominal Pain] : abdominal pain [Constipation] : constipation [Muscle Pain] : muscle pain [Muscle Weakness] : muscle weakness [Difficulty Walking] : difficulty walking [Negative] : Integumentary [Mucosal Pain] : mucosal pain

## 2022-02-23 NOTE — REVIEW OF SYSTEMS
[Abdominal Pain] : abdominal pain [Constipation] : constipation [Joint Pain] : joint pain [Negative] : Allergic/Immunologic [Fatigue] : fatigue [Skin Wound] : no skin wound [FreeTextEntry4] : thrush [FreeTextEntry7] : intermittent constipation [FreeTextEntry9] : lower back pain

## 2022-02-23 NOTE — RESULTS/DATA
[FreeTextEntry1] : WBC 10,110 Hgb 8.3 Hct 25 .6 Platelets 183,000 Diff 72P, 6L, 7M, 14Eo, 1 Ba, 1Imm Gran\par \par 1/5/22\par Cardiac MRI: Normal biventricular size and systolic function (LEVEF=68%; RVEF=66%). No evidence of cardiac ion overload. Mild intra-myocardial LGE in the basal inferolateral LV segment may be related to prior inflammation (i.e. myocarditis, also can be seen with mitral prolapse).

## 2022-02-23 NOTE — PHYSICAL EXAM
[Restricted in physically strenuous activity but ambulatory and able to carry out work of a light or sedentary nature] : Status 1- Restricted in physically strenuous activity but ambulatory and able to carry out work of a light or sedentary nature, e.g., light house work, office work [Normal] : pharynx is unremarkable, moist mucus membrane, no oral lesions [de-identified] : No dullness in Traube's space.

## 2022-02-23 NOTE — HISTORY OF PRESENT ILLNESS
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  Pt c/o generalized malaise, numbness and tingling in hands and feet, lower back pain, BLE edema. \par \par Had rectal bleed one week after SCT, had colonoscopy with Nl findings; bleed stopped on its own.   Initially rejected SCT in 6/21, had SCT boost on 6/25/21.  Has some difficulty swallowing and oral thrush-using Decadron mouthwash.  Has lost about 20 lbs-states he is eating more now.  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 Ferritin elevated.   Denies fevers, chills, night sweats, bleeding, chest or abdominal pain; reports occas diff urinating-slow stream and frequency, some fatigue.  Will start PT for generalized weakness and pelvic floor dysfunction.  Reports feeling stressed but feels like he has good coping mechanisms.  Was receiving Aranesp weekly post transplant.  Has not needed PRBC's since 12/29/21.   \par \par \par

## 2022-02-23 NOTE — PHYSICAL EXAM
[Fully active, able to carry on all pre-disease performance without restriction] : Status 0 - Fully active, able to carry on all pre-disease performance without restriction [Normal] : affect appropriate [de-identified] : No dullness in Traube's space.  [de-identified] : R first finger swelling and redness around nail bed

## 2022-02-23 NOTE — CONSULT LETTER
[Dear  ___] : Dear ~PEDRO, [Courtesy Letter:] : I had the pleasure of seeing your patient, [unfilled], in my office today. [Please see my note below.] : Please see my note below. [Sincerely,] : Sincerely, [DrGuille  ___] : Dr. MONTES [FreeTextEntry2] : Patrick Gonzales MD [FreeTextEntry3] : Nilo\par Noam Coker M.D., FACP\par Professor of Medicine\par [employer]\par Associate Chief, Division of Hematology\par [facility]\par [facility]\par [address]\[place] [address]\par [phone]\par \par \par \par

## 2022-02-23 NOTE — ADDENDUM
[FreeTextEntry1] : I, Rohan Robby, acted solely as a scribe for Dr. Noam Coker on 02/23/2022. All medical entries made by the Scribe were at my, Dr. Noam Coker's, direction and personally dictated by me on 02/23/2022. I have reviewed the chart and agree that the record accurately reflects my personal performance of the history, physical exam, assessment and plan. I have also personally directed, reviewed, and agreed with the chart.

## 2022-02-23 NOTE — ASSESSMENT
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 The latter is not clinically significant. \par \par Pt returns after one year post MUD allogeneic SCT at [facility] in 3/2021with Dr Vivian Altamirano.   Initially rejected SCT in 6/21, had SCT boost on 6/25/21. Major ABO mismatch-received Rituxan x 4 and IVIG.  Has not required PRBC transfusion since 12/21.  Receiving Retacrit 40,000 units weekly; pt will receive this here.  Eosinophil 41%, email sent to Dr Altamirano who noted they are slightly higher than before when they were 33%; will continue to monitor.  Pt is deconditioned, will start PT.   Ferritin level high, [facility] has said they will MRI of liver in a few months.       \par \par \par Plan:\par Retacrit 40,000 units weekly.\par CBC \par Decadron for oral thrush\par RTC in one week. \par  \par

## 2022-02-23 NOTE — ASSESSMENT
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 Cytogenetics demonstrated the presence of del 13q and NGS revealed mutations in MPL and TET2. Marrow showed clonal evolution and a new MBUS population. The latter is not clinically significant. \par \par Weight loss, worsening anemia, increased blasts in peripheral blood and marrow, increased WBC indicated progression to more aggressive disease that warranted treatment. DIPSS score-6, high risk category. Was treated with Dacogen and Jakafi. Responded well with symptomatic improvement and resolution of splenomegaly. He then underwent MUD/ABO mismatch allogeneic stem cell transplantation followed by DLI.  Doing well. Eosinophilia improved.\par \par Plan:\par Retacrit 40,000 units weekly \par Acyclovir/atovaquone\par Decadron oral rinse\par CMP, LDH\par Rheum consult\par [place] f/u\par RTC 2 weeks\par  \par  [Curative] : Goals of care discussed with patient: Curative

## 2022-02-23 NOTE — RESULTS/DATA
[FreeTextEntry1] : 6/2/21 Bone Marrow Biopsy\par Surgical Pathology\par -Marrow with diffuse collagen fibrosis and scant hematopoietic cells.\par -Flow Cytometry showed no immunophenotypically abnormal cell population.\par -CD3/CD33 100%/100% donor. \par \par 6/4/21\par Abdominal US\par Spleen: The spleen is enlarged measuring 15.1 x 8.9 x 8.9 cm.  A 3.2 cm splenule is noted.

## 2022-03-03 NOTE — CONSULT LETTER
[Dear  ___] : Dear ~PEDRO, [Courtesy Letter:] : I had the pleasure of seeing your patient, [unfilled], in my office today. [Please see my note below.] : Please see my note below. [Sincerely,] : Sincerely, [DrGuille  ___] : Dr. MONTES [FreeTextEntry2] : Patrick Gonzales MD [FreeTextEntry3] : Nilo\par Noam Coker M.D., FACP\par Professor of Medicine\par [employer] [facility] [employer]\par Associate Chief, Division of Hematology\par [facility]\par [facility]\par [address]\[place] [address]\par [phone]\par \par \par \par

## 2022-03-03 NOTE — REVIEW OF SYSTEMS
[Fatigue] : fatigue [Mucosal Pain] : mucosal pain [Lower Ext Edema] : lower extremity edema [SOB on Exertion] : shortness of breath during exertion [Abdominal Pain] : abdominal pain [Constipation] : constipation [Muscle Pain] : muscle pain [Muscle Weakness] : muscle weakness [Difficulty Walking] : difficulty walking [Negative] : Allergic/Immunologic [Diarrhea] : diarrhea [FreeTextEntry9] : back pain

## 2022-03-03 NOTE — PHYSICAL EXAM
[Restricted in physically strenuous activity but ambulatory and able to carry out work of a light or sedentary nature] : Status 1- Restricted in physically strenuous activity but ambulatory and able to carry out work of a light or sedentary nature, e.g., light house work, office work [Normal] : affect appropriate [de-identified] : No dullness in Traube's space.

## 2022-03-03 NOTE — ASSESSMENT
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 Cytogenetics demonstrated the presence of del 13q and NGS revealed mutations in MPL and TET2. Marrow showed clonal evolution and a new MBUS population. The latter is not clinically significant. \par \par Weight loss, worsening anemia, increased blasts in peripheral blood and marrow, increased WBC indicated progression to more aggressive disease that warranted treatment. DIPSS score-6, high risk category. Was treated with Dacogen and Jakafi. Responded well with symptomatic improvement and resolution of splenomegaly. He then underwent MUD/ABO mismatch allogeneic stem cell transplantation followed by DLI.  Doing well. Eosinophilia improved yet remains slightly elevated..\par \par Plan:\par Retacrit 40,000 units weekly \par Acyclovir/atovaquone\par Decadron oral rinse\par CMP, LDH\par Rheum consult\par [place] f/u\par RTC 2 weeks\par  \par

## 2022-03-03 NOTE — HISTORY OF PRESENT ILLNESS
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 Has malaise, low energy and general weakness. Has trouble standing and walking for longer than a block.  Reports INGRAM. Reports lower back pain which has worsened, taking Flexerial with some relief.  Had one episode of loose stools from Monday night into Tuesday; this has now resolved.   Also has leg swelling, for which he elevates his legs and wears compression socks. He reports limited ROM in his hands. He is unable to make fists. Will see Rheum. 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  No difficulty swallowing. Being treated with Decadron mouth wash. Appetite good. Weight stable. He notes no fever, night sweats, chest pain, headaches, visual problems, BRBPR, melena. Last transfused in December. Pulmonary function test scheduled for 3/14. \par \par

## 2022-03-11 NOTE — ASSESSMENT
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 Cytogenetics demonstrated the presence of del 13q and NGS revealed mutations in MPL and TET2. Marrow showed clonal evolution and a new MBUS population. The latter is not clinically significant. \par \par Weight loss, worsening anemia, increased blasts in peripheral blood and marrow, increased WBC indicated progression to more aggressive disease that warranted treatment. DIPSS score-6, high risk category. Was treated with Dacogen and Jakafi. Responded well with symptomatic improvement and resolution of splenomegaly. He then underwent MUD/ABO mismatch allogeneic stem cell transplantation followed by DLI.  Doing well. Eosinophilia improved yet remains slightly elevated.  Hgb up to 10.3 today.  \par \par Plan:\par Retacrit 40,000 units weekly \par Acyclovir/atovaquone\par Decadron oral rinse\par CMP, LDH\par Rheum consult\par [place] f/u next week\par RTC 2 weeks\par  \par

## 2022-03-11 NOTE — CONSULT LETTER
[Dear  ___] : Dear ~PEDRO, [Courtesy Letter:] : I had the pleasure of seeing your patient, [unfilled], in my office today. [Please see my note below.] : Please see my note below. [Sincerely,] : Sincerely, [DrGuille  ___] : Dr. MONTES [FreeTextEntry2] : Patrick Gonzales MD [FreeTextEntry3] : Nilo\par Noam Coker M.D., FACP\par Professor of Medicine\par [employer]\par Associate Chief, Division of Hematology\par [facility]\par [facility]\par [address]\[place] [address]\par [phone]\par \par \par \par

## 2022-03-11 NOTE — PHYSICAL EXAM
[Restricted in physically strenuous activity but ambulatory and able to carry out work of a light or sedentary nature] : Status 1- Restricted in physically strenuous activity but ambulatory and able to carry out work of a light or sedentary nature, e.g., light house work, office work [Normal] : affect appropriate [Ulcers] : ulcers [de-identified] : No dullness in Traube's space.

## 2022-03-11 NOTE — HISTORY OF PRESENT ILLNESS
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 Has malaise, low energy and generalized weakness. Reports INGRAM. Still c/o lower back pain which has worsened, taking Flexeril with some relief.  No diarrhea in last week.  Leg swelling is stable. He reports limited ROM in his hands. He is unable to make fists. Will see Rheum. Oral GVHD has improved.  Being treated with Decadron mouth wash  No difficulty swallowing. Has broken 2 teeth in last week.   Appetite good. Weight stable. He notes no fever, night sweats, chest pain, headaches, visual problems, BRBPR, melena. Last transfused in December. Pulmonary function test scheduled for 3/14. \par \par

## 2022-03-11 NOTE — REVIEW OF SYSTEMS
[Fatigue] : fatigue [Mucosal Pain] : mucosal pain [SOB on Exertion] : shortness of breath during exertion [Abdominal Pain] : abdominal pain [Constipation] : constipation [Muscle Pain] : muscle pain [Muscle Weakness] : muscle weakness [Difficulty Walking] : difficulty walking [Negative] : Cardiovascular [Lower Ext Edema] : no lower extremity edema [Diarrhea] : no diarrhea [FreeTextEntry9] : back pain

## 2022-03-24 NOTE — ASSESSMENT
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 Cytogenetics demonstrated the presence of del 13q and NGS revealed mutations in MPL and TET2. Marrow showed clonal evolution and a new MBUS population. The latter is not clinically significant. \par \par Weight loss, worsening anemia, increased blasts in peripheral blood and marrow, increased WBC indicated progression to more aggressive disease that warranted treatment. DIPSS score-6, high risk category. Was treated with Dacogen and Jakafi. Responded well with symptomatic improvement and resolution of splenomegaly. He then underwent MUD/ABO mismatch allogeneic stem cell transplantation followed by DLI.  Doing well. Jakafi 5 mg BID started last week by Dr Altamirano.  Hgb up to 12.1 today.  Eosinophils 8.7 today, improved.  Has some white coating in mouth, no ulcerations; will continue Decadron mouth wash.\par \par Plan:\par Retacrit 40,000 units weekly-not needed today \par Acyclovir/atovaquone\par Jakafi 5 mg BID\par Decadron oral rinse\par CMP, LDH\par Rheum consult\par [facility] f/u next week\par RTC 2 weeks\par  \par

## 2022-03-24 NOTE — REVIEW OF SYSTEMS
[Fatigue] : fatigue [Mucosal Pain] : mucosal pain [SOB on Exertion] : shortness of breath during exertion [Abdominal Pain] : abdominal pain [Muscle Pain] : muscle pain [Muscle Weakness] : muscle weakness [Difficulty Walking] : difficulty walking [Negative] : Allergic/Immunologic [Lower Ext Edema] : no lower extremity edema [Constipation] : no constipation [Diarrhea] : no diarrhea [FreeTextEntry9] : back pain has improved

## 2022-03-24 NOTE — CONSULT LETTER
[Dear  ___] : Dear ~PEDRO, [Courtesy Letter:] : I had the pleasure of seeing your patient, [unfilled], in my office today. [Please see my note below.] : Please see my note below. [Sincerely,] : Sincerely, [DrGuille  ___] : Dr. MONTES [FreeTextEntry2] : Patrick Gonzales MD [FreeTextEntry3] : Nilo\par Noam Coker M.D., FACP\par Professor of Medicine\par [facility] [employer]\par Associate Chief, Division of Hematology\par [facility]\par Health system\par [address]\[place] [address]\par [phone]\par \par \par \par

## 2022-03-24 NOTE — HISTORY OF PRESENT ILLNESS
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 3/16/22- started Jakafi 5 mg BID by Dr Altamirano [de-identified] : Feels tired-has low energy and generalized weakness. Lower back pain has improved the last few days.  No diarrhea in last week.  Leg swelling is stable. He reports limited ROM in his hands. He is unable to make fists. Will see Rheum. Has broken 2 teeth in last week.   Appetite good. Weight stable. He notes no fever, night sweats, chest pain, INGRAM, headaches, visual problems, BRBPR, melena. Last transfused in December. Had PFT's on 3/14. Mouth with white coating-gets irritated when brushing teeth or eating spicy food-using Decadron mouth wash, denies diff swallowing.  Recnetly started Jakafi 5 mg BID. \par \par

## 2022-03-24 NOTE — PHYSICAL EXAM
[Restricted in physically strenuous activity but ambulatory and able to carry out work of a light or sedentary nature] : Status 1- Restricted in physically strenuous activity but ambulatory and able to carry out work of a light or sedentary nature, e.g., light house work, office work [Ulcers] : ulcers [Normal] : affect appropriate [de-identified] : No dullness in Traube's space.

## 2022-04-06 PROBLEM — B37.0 CANDIDIASIS, MOUTH: Status: ACTIVE | Noted: 2022-01-01

## 2022-04-07 NOTE — CONSULT LETTER
[Dear  ___] : Dear ~PEDRO, [Courtesy Letter:] : I had the pleasure of seeing your patient, [unfilled], in my office today. [Please see my note below.] : Please see my note below. [Sincerely,] : Sincerely, [DrGuille  ___] : Dr. MONTES [FreeTextEntry2] : Patrick Gonzales MD [FreeTextEntry3] : Nilo\par Noam Coker M.D., FACP\par Professor of Medicine\par [facility] [employer]\par Associate Chief, Division of Hematology\par [facility]\par [facility]\par [address]\[place] [address]\par [phone]\par \par \par \par

## 2022-04-07 NOTE — HISTORY OF PRESENT ILLNESS
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 3/16/22- started Jakafi 5 mg BID by Dr Altamirano [de-identified] : Feels less tired. Lower back pain had improved but last Friday it worsened but on L side after bending over to pick something up.  No diarrhea in last week.  Leg swelling is stable. He reports limited ROM in his hands. He is unable to make fists. Will see Rheum. Appetite good. Weight stable. He notes no fever, night sweats, chest pain, INGRAM, headaches, visual problems, BRBPR, melena. Last transfused in December. Had PFT's on 3/14. Mouth with white coating-gets irritated when brushing teeth or eating spicy food-using Decadron mouth wash, denies diff swallowing.  Recently started Jakafi 5 mg BID, notes dry voice while on Jakafi.   \par \par

## 2022-04-07 NOTE — ASSESSMENT
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 Cytogenetics demonstrated the presence of del 13q and NGS revealed mutations in MPL and TET2. Marrow showed clonal evolution and a new MBUS population. The latter is not clinically significant. \par \par Weight loss, worsening anemia, increased blasts in peripheral blood and marrow, increased WBC indicated progression to more aggressive disease that warranted treatment. DIPSS score-6, high risk category. Was treated with Dacogen and Jakafi. Responded well with symptomatic improvement and resolution of splenomegaly. He then underwent MUD/ABO mismatch allogeneic stem cell transplantation followed by DLI.  Doing well. Jakafi 5 mg BID started 3 weeks ago by Dr Altamirano.  Hgb up to 11.7 today.  Eosinophils 4.5 today, improved.  Has some white coating in mouth, no ulcerations; will continue Decadron mouth wash.\par \par Plan:\par Retacrit 40,000 units weekly-not needed today \par As per Dr Altamirano, will start IVIG monthly. \par Acyclovir/atovaquone\par Jakafi 5 mg BID\par Decadron oral rinse\par CMP, LDH\par Rheum consult\par RTC 2 weeks\par  \par

## 2022-04-07 NOTE — REVIEW OF SYSTEMS
[Mucosal Pain] : mucosal pain [SOB on Exertion] : shortness of breath during exertion [Abdominal Pain] : abdominal pain [Muscle Pain] : muscle pain [Muscle Weakness] : muscle weakness [Difficulty Walking] : difficulty walking [Negative] : Constitutional [Fatigue] : no fatigue [Lower Ext Edema] : no lower extremity edema [Constipation] : no constipation [Diarrhea] : no diarrhea [FreeTextEntry9] : back pain now radiating down L leg

## 2022-04-07 NOTE — PHYSICAL EXAM
[Restricted in physically strenuous activity but ambulatory and able to carry out work of a light or sedentary nature] : Status 1- Restricted in physically strenuous activity but ambulatory and able to carry out work of a light or sedentary nature, e.g., light house work, office work [Normal] : affect appropriate [Ulcers] : no ulcers [Mucositis] : mucositis [de-identified] : No dullness in Traube's space. [de-identified] : white coating in mouth

## 2022-04-20 NOTE — ADDENDUM
[FreeTextEntry1] : I, Rohan Robby, acted solely as a scribe for Dr. Noam Coker on 04/20/2022. All medical entries made by the Scribe were at my, Dr. Noam Coker's, direction and personally dictated by me on 04/20/2022. I have reviewed the chart and agree that the record accurately reflects my personal performance of the history, physical exam, assessment and plan. I have also personally directed, reviewed, and agreed with the chart.

## 2022-04-20 NOTE — RESULTS/DATA
[FreeTextEntry1] : WBC 3590 Hgb 12 Hct 34.6 .1 Platelets 127,000 Diff 62P 17L 13M 1Imm Gran 6Eos 1Ba \par \par

## 2022-04-20 NOTE — ASSESSMENT
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 Cytogenetics demonstrated the presence of del 13q and NGS revealed mutations in MPL and TET2. Marrow showed clonal evolution and a new MBUS population. The latter is not clinically significant. \par \par Weight loss, worsening anemia, increased blasts in peripheral blood and marrow, increased WBC indicated progression to more aggressive disease that warranted treatment. DIPSS score-6, high risk category. Was treated with Dacogen and Jakafi. Responded well with symptomatic improvement and resolution of splenomegaly. He then underwent MUD/ABO mismatch allogeneic stem cell transplantation followed by DLI.  Doing well. Eosinophilia improved. Has restarted Jakafi. Tolerating it well. Will begin IVGG today. \par \par Plan:\par Retacrit 40,000 units weekly - hold this week\par Acyclovir/atovaquone\par IVGG 25 g IV monthly\par Jakafi 5 mg BID\par Decadron oral rinse\par CMP, LDH\par Neurology consultation\par Dermatology consultation\par [facility] f/u\par RTC 1 week\par

## 2022-04-20 NOTE — PHYSICAL EXAM
[Restricted in physically strenuous activity but ambulatory and able to carry out work of a light or sedentary nature] : Status 1- Restricted in physically strenuous activity but ambulatory and able to carry out work of a light or sedentary nature, e.g., light house work, office work [Normal] : affect appropriate [de-identified] : No dullness in Traube's space.

## 2022-04-20 NOTE — CONSULT LETTER
[Dear  ___] : Dear ~PEDRO, [Courtesy Letter:] : I had the pleasure of seeing your patient, [unfilled], in my office today. [Please see my note below.] : Please see my note below. [Sincerely,] : Sincerely, [DrGuille  ___] : Dr. MONTES [FreeTextEntry2] : Patrick Gonzales MD [FreeTextEntry3] : Nilo\par Noam Coker M.D., FACP\par Professor of Medicine\par [employer] [facility] [employer]\par Associate Chief, Division of Hematology\par [facility]\par [facility]\par [address]\[place] [address]\par [phone]\par \par \par \par

## 2022-04-27 NOTE — CONSULT LETTER
[Dear  ___] : Dear ~PEDRO, [Courtesy Letter:] : I had the pleasure of seeing your patient, [unfilled], in my office today. [Please see my note below.] : Please see my note below. [Sincerely,] : Sincerely, [FreeTextEntry2] : Patrick Gonzales MD [FreeTextEntry3] : Nilo\par Noam Coker M.D., FACP\par Professor of Medicine\par [facility] [employer]\par Associate Chief, Division of Hematology\par [facility]\par [facility]\par [address]\[place] [address]\par [phone]\par \par \par \par   [DrGuille  ___] : Dr. MONTES

## 2022-04-27 NOTE — REASON FOR VISIT
[Follow-Up Visit] : a follow-up visit for [Blood Count Assessment] : blood count assessment [Myeloproliferative Disorder] : myeloproliferative disorder [Thrombocytosis] : thrombocytosis

## 2022-04-27 NOTE — HISTORY OF PRESENT ILLNESS
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 Had IVGG 1 week ago. Complains of low back pain still.  No leg swelling since elevates his legs and wears compression socks. He reports limited ROM in his hands, but improved. 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 Being treated with Decadron mouth wash. Appetite good. Weight stable. He notes no fever, night sweats, chest pain, INGRAM, headaches, visual problems, constipation, BRBPR, melena. \par \par

## 2022-04-27 NOTE — REVIEW OF SYSTEMS
[FreeTextEntry1] : 1. HTN- BP is controlled on tx- 132/82- and is UTD.  diet/ activity/fall precautions reviewed.  2.  Discussed his use of  flonase and astelin- and timing of use.  3. and 4.  We discussed his usage of alprazolam and agreed he could use less so rx changed from # 100 to 50-  1/2 to 1 bid prn- Use/precautions/safety of medication all reviewed.  [place]  Safe use of medication reinforced. \par  [Joint Pain] : joint pain [Negative] : Allergic/Immunologic

## 2022-04-27 NOTE — ASSESSMENT
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 Cytogenetics demonstrated the presence of del 13q and NGS revealed mutations in MPL and TET2. Marrow showed clonal evolution and a new MBUS population. The latter is not clinically significant. \par \par Weight loss, worsening anemia, increased blasts in peripheral blood and marrow, increased WBC indicated progression to more aggressive disease that warranted treatment. DIPSS score-6, high risk category. Was treated with Dacogen and Jakafi. Responded well with symptomatic improvement and resolution of splenomegaly. He then underwent MUD/ABO mismatch allogeneic stem cell transplantation followed by DLI. Doing well. Eosinophilia improved. Has restarted Jakafi. Tolerating it well. Hgb rising. Overall much improved.\par \par Plan:\par Retacrit 40,000 units weekly - hold this week\par Acyclovir/atovaquone\par IVGG 25 g IV monthly\par Jakafi 5 mg BID\par Decadron oral rinse\par CMP, LDH\par [facility] [place] f/u\par RTC 1 week\par  [Curative] : Goals of care discussed with patient: Curative

## 2022-05-04 NOTE — ASSESSMENT
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 Cytogenetics demonstrated the presence of del 13q and NGS revealed mutations in MPL and TET2. Marrow showed clonal evolution and a new MBUS population. The latter is not clinically significant. \par \par Weight loss, worsening anemia, increased blasts in peripheral blood and marrow, increased WBC indicated progression to more aggressive disease that warranted treatment. DIPSS score-6, high risk category. Was treated with Dacogen and Jakafi. Responded well with symptomatic improvement and resolution of splenomegaly. He then underwent MUD/ABO mismatch allogeneic stem cell transplantation followed by DLI. Doing well. Eosinophilia improved. Has restarted Jakafi. Tolerating it well. Hgb stable. Overall much improved. Given this, treatment with Retacrit is no longer indicated.\par \par Plan:\par D/C Retacrit\par Acyclovir/atovaquone\par IVGG 25 g IV monthly\par Jakafi 5 mg BID\par Decadron oral rinse\par [facility] f/u\par RTC 2 weeks

## 2022-05-04 NOTE — CONSULT LETTER
[Dear  ___] : Dear ~PEDRO, [Courtesy Letter:] : I had the pleasure of seeing your patient, [unfilled], in my office today. [Please see my note below.] : Please see my note below. [Sincerely,] : Sincerely, [DrGuille  ___] : Dr. MONTES [FreeTextEntry2] : Patrick Gonzales MD [FreeTextEntry3] : Nilo\par Noam Coker M.D., FACP\par Professor of Medicine\par [facility] [facility] [employer]\par Associate Chief, Division of Hematology\par [facility]\par [facility]\par [address]\[place] [address]\par [phone]\par \par \par \par

## 2022-05-04 NOTE — HISTORY OF PRESENT ILLNESS
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 Complains of low back pain still.  No leg swelling. Wears compression socks. 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 Being treated with Decadron mouth wash. Appetite good. Has gained 3 lbs. He notes no fever, night sweats, chest pain, INGRAM, headaches, visual problems, constipation, BRBPR, melena. \par \par

## 2022-05-04 NOTE — PHYSICAL EXAM
[Restricted in physically strenuous activity but ambulatory and able to carry out work of a light or sedentary nature] : Status 1- Restricted in physically strenuous activity but ambulatory and able to carry out work of a light or sedentary nature, e.g., light house work, office work [Normal] : affect appropriate [de-identified] : No dullness in Traube's space.

## 2022-05-04 NOTE — ADDENDUM
[FreeTextEntry1] : I, Rohan Bustamante, acted solely as a scribe for Dr. Noam Coker on 05/04/2022. All medical entries made by the Scribe were at my, Dr. Noam Coker's, direction and personally dictated by me on 05/04/2022. I have reviewed the chart and agree that the record accurately reflects my personal performance of the history, physical exam, assessment and plan. I have also personally directed, reviewed, and agreed with the chart.

## 2022-05-04 NOTE — REVIEW OF SYSTEMS
[Joint Pain] : joint pain [Negative] : Allergic/Immunologic [Recent Change In Weight] : ~T recent weight change [FreeTextEntry4] : Oral GVHD

## 2022-05-04 NOTE — RESULTS/DATA
[FreeTextEntry1] : WBC 6250 Hgb 13 Hct 37.7 .8 Platelets 140,000 Diff 76P 12L 8M 1Imm Gran 4Eos 1Ba \par \par

## 2022-05-18 NOTE — HISTORY OF PRESENT ILLNESS
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 Complains of low back pain still.  No leg swelling. Wears compression socks. 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 Being treated with Decadron mouth wash. Appetite good. Has lost 3 lbs. He notes no fever, night sweats, chest pain, INGRAM, headaches, visual problems, constipation, BRBPR, melena. Had re-vaccination and Shingrix x 1. \par \par  Speaking Coherently

## 2022-05-18 NOTE — RESULTS/DATA
[FreeTextEntry1] : WBC 7860 Hgb 13.4 Hct 38.2 MCV 97.9 Platelets 134,000 Diff 76P 10L 9M 5Eos 1Ba \par \par

## 2022-05-18 NOTE — CONSULT LETTER
[Dear  ___] : Dear ~PEDRO, [Courtesy Letter:] : I had the pleasure of seeing your patient, [unfilled], in my office today. [Please see my note below.] : Please see my note below. [Sincerely,] : Sincerely, [DrGuille  ___] : Dr. MONTES [FreeTextEntry2] : Patirck Gonzales MD [FreeTextEntry3] : Nilo\par Noam Coker M.D., FACP\par Professor of Medicine\par [facility] [employer]\par Associate Chief, Division of Hematology\par [facility]\par [facility]\par [address]\[place] [address]\par [phone]\par \par \par \par

## 2022-05-18 NOTE — ADDENDUM
[FreeTextEntry1] : I, Ardana Bustamante, acted solely as a scribe for Dr. Noam Coker on 05/18/2022. All medical entries made by the Scribe were at my, Dr. Noam Coker's, direction and personally dictated by me on 05/18/2022. I have reviewed the chart and agree that the record accurately reflects my personal performance of the history, physical exam, assessment and plan. I have also personally directed, reviewed, and agreed with the chart.

## 2022-05-18 NOTE — REVIEW OF SYSTEMS
[Recent Change In Weight] : ~T recent weight change [Joint Pain] : joint pain [Negative] : Allergic/Immunologic [FreeTextEntry4] : Oral GVHD

## 2022-05-18 NOTE — ASSESSMENT
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 Cytogenetics demonstrated the presence of del 13q and NGS revealed mutations in MPL and TET2. Marrow showed clonal evolution and a new MBUS population. The latter is not clinically significant. \par \par Weight loss, worsening anemia, increased blasts in peripheral blood and marrow, increased WBC indicated progression to more aggressive disease that warranted treatment. DIPSS score-6, high risk category. Was treated with Dacogen and Jakafi. Responded well with symptomatic improvement and resolution of splenomegaly. He then underwent MUD/ABO mismatch allogeneic stem cell transplantation followed by DLI. Doing well. Eosinophilia improved. Has restarted Jakafi. Tolerating it well. Hgb stable. Overall much improved. Given this, treatment with Retacrit was no longer indicated and discontinued. \par \par Plan:\par Acyclovir/atovaquone\par IVGG 25 g IV monthly\par Jakafi 5 mg BID\par Decadron oral rinse\par [facility] f/u\par Shingrix second dose \par RTC 1 month

## 2022-05-18 NOTE — PHYSICAL EXAM
[Restricted in physically strenuous activity but ambulatory and able to carry out work of a light or sedentary nature] : Status 1- Restricted in physically strenuous activity but ambulatory and able to carry out work of a light or sedentary nature, e.g., light house work, office work [Normal] : affect appropriate [de-identified] : No dullness in Traube's space.

## 2022-05-25 NOTE — CONSULT LETTER
[Dear  ___] : Dear ~PEDRO, [Courtesy Letter:] : I had the pleasure of seeing your patient, [unfilled], in my office today. [Please see my note below.] : Please see my note below. [Sincerely,] : Sincerely, [DrGuille  ___] : Dr. MONTES [FreeTextEntry2] : Patrick Gonzales MD [FreeTextEntry3] : Nilo\par Noam Coker M.D., FACP\par Professor of Medicine\par [facility] [employer]\par Associate Chief, Division of Hematology\par [facility]\par [facility]\par [address]\[place] [address]\par [phone]\par \par \par \par

## 2022-05-25 NOTE — HISTORY OF PRESENT ILLNESS
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 Complains of low back pain still.  No leg swelling. Wears compression socks. 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 Being treated with Decadron mouth wash. Appetite good. Has lost 3 lbs. He notes no fever, night sweats, chest pain, INGRAM, headaches, visual problems, constipation, BRBPR, melena. Had re-vaccination and Shingrix x 1. \par \par

## 2022-05-25 NOTE — ADDENDUM
[FreeTextEntry1] : I, Rohan Robby, acted solely as a scribe for Dr. Noam Coker on 05/25/2022. All medical entries made by the Scribe were at my, Dr. Noam Coker's, direction and personally dictated by me on 05/25/2022. I have reviewed the chart and agree that the record accurately reflects my personal performance of the history, physical exam, assessment and plan. I have also personally directed, reviewed, and agreed with the chart.

## 2022-06-09 PROBLEM — T80.90XA INFUSION REACTION, INITIAL ENCOUNTER: Status: ACTIVE | Noted: 2022-01-01

## 2022-06-09 PROBLEM — D72.821 MONOCYTOSIS: Status: ACTIVE | Noted: 2019-08-06

## 2022-06-09 PROBLEM — D72.820 MONOCLONAL B-CELL LYMPHOCYTOSIS OF UNDETERMINED SIGNIFICANCE: Status: ACTIVE | Noted: 2020-06-30

## 2022-06-17 PROBLEM — Z94.84 H/O STEM CELL TRANSPLANT: Status: ACTIVE | Noted: 2022-01-01

## 2022-06-17 NOTE — CONSULT LETTER
[Dear  ___] : Dear ~PEDRO, [Courtesy Letter:] : I had the pleasure of seeing your patient, [unfilled], in my office today. [Please see my note below.] : Please see my note below. [Sincerely,] : Sincerely, [DrGuille  ___] : Dr. MONTES [FreeTextEntry2] : Patrick Gonzales MD [FreeTextEntry3] : Nilo\par Noam Coker M.D., FACP\par Professor of Medicine\par [facility] [employer]\par Associate Chief, Division of Hematology\par [facility]\par [facility]\par [address]\[place] [address]\par [phone]\par \par \par \par

## 2022-06-17 NOTE — ASSESSMENT
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 Cytogenetics demonstrated the presence of del 13q and NGS revealed mutations in MPL and TET2. Marrow showed clonal evolution and a new MBUS population. The latter is not clinically significant. \par \par Weight loss, worsening anemia, increased blasts in peripheral blood and marrow, increased WBC indicated progression to more aggressive disease that warranted treatment. DIPSS score-6, high risk category. Was treated with Dacogen and Jakafi. Responded well with symptomatic improvement and resolution of splenomegaly. He then underwent MUD/ABO mismatch allogeneic stem cell transplantation followed by DLI. Doing well. Eosinophilia improved. Has restarted Jakafi. Tolerating it well. Hgb stable. Overall much improved. Given this, treatment with Retacrit has been discontinued.  Will receive IVIG monthly.  Being followed by [facility] transplant team.   Has had INGRAM last few weeks, O2 sat 93%. Will do CXR. See pulmonary-Dr Qureshi.  Will follow up on results.       \par \par Plan:\par Acyclovir/atovaquone\par IVGG 25 g IV monthly\par Jakafi 5 mg BID\par Decadron oral rinse\par [facility] f/u\par See pulmonary-Dr Qureshi\par CXR \par RTC 1 month

## 2022-06-17 NOTE — REVIEW OF SYSTEMS
[Recent Change In Weight] : ~T recent weight change [Joint Pain] : joint pain [Negative] : Allergic/Immunologic [Shortness Of Breath] : shortness of breath [FreeTextEntry4] : Oral GVHD  [FreeTextEntry6] : feels INGRAM last few weeks

## 2022-06-17 NOTE — HISTORY OF PRESENT ILLNESS
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 Complains of low back pain still-taking Flexeril twice a day.  No leg swelling. Wears compression socks. 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 Being treated with Decadron mouth wash. Appetite good. Has lost 3 lbs. He notes no fever, night sweats, chest pain, INGRAM, headaches, visual problems, constipation, BRBPR, melena. Had re-vaccination and Shingrix x 1. Feels dyspneic for the last 3 weeks, at times feels like he can't catch his breath.  O2 sat-93 % today.  \par \par

## 2022-07-13 PROBLEM — R00.0 SINUS TACHYCARDIA: Status: ACTIVE | Noted: 2020-06-09

## 2022-07-13 NOTE — HISTORY OF PRESENT ILLNESS
[FreeTextEntry1] : S/P stem cell transplant.\par BPs 120s/70s-80s.\par Had bilateral edema.\par Hgb up to 12.8.\par Last echo 12/2021 - told no MR. Cardiologist Dr. Hunter Haider.\par Amlodipine previously discontinued. Recently, valsartan 40 qd stopped and metoprolol increased to 100 qd.\par He denies chest pain, shortness of breath, and palpitations.\par \par

## 2022-07-13 NOTE — HISTORY OF PRESENT ILLNESS
[FreeTextEntry1] : 04/04/2022 - Office visit:\par S/P stem cell transplant.\par BPs 120s/70s-80s.\par Had bilateral edema.\par Hgb up to 12.8.\par Last echo 12/2021 - told no MR. Cardiologist Dr. Hunter Haider.\par Amlodipine previously discontinued. Recently, valsartan 40 qd stopped and metoprolol increased to 100 qd.\par He denies chest pain, shortness of breath, and palpitations.\par \par 07/11/2022 - Office visit:\par Some SOB recently. When getting into car, small distances,  lying down on back.  \par 1 month ago, significant SOB after walking and getting in car.\par SOB worse with mask.\par Pulse ox usually 93-94%.\par No CP or palpitations.\par To start PT.\par CXR  6/17 - atelectasis.\par Muscle aches.\par \par \par

## 2022-08-15 PROBLEM — I25.10 CORONARY ARTERY DISEASE: Status: ACTIVE | Noted: 2019-10-08

## 2022-08-15 PROBLEM — Z87.898 HISTORY OF DIZZINESS: Status: RESOLVED | Noted: 2021-01-31 | Resolved: 2022-01-01

## 2022-08-15 PROBLEM — I34.0 MITRAL REGURGITATION: Status: ACTIVE | Noted: 2017-05-16

## 2022-08-15 PROBLEM — E66.3 OVERWEIGHT (BMI 25.0-29.9): Status: RESOLVED | Noted: 2019-06-06 | Resolved: 2022-01-01

## 2022-08-15 PROBLEM — Z87.898 HISTORY OF CHEST PAIN: Status: RESOLVED | Noted: 2020-06-09 | Resolved: 2022-01-01

## 2022-08-15 PROBLEM — Z87.898 HISTORY OF PALPITATIONS: Status: RESOLVED | Noted: 2020-06-09 | Resolved: 2022-01-01

## 2022-08-15 NOTE — HISTORY OF PRESENT ILLNESS
[FreeTextEntry1] : 04/04/2022 - Office visit:\par S/P stem cell transplant.\par BPs 120s/70s-80s.\par Had bilateral edema.\par Hgb up to 12.8.\par Last echo 12/2021 - told no MR. Cardiologist Dr. Hnuter Haider.\par Amlodipine previously discontinued. Recently, valsartan 40 qd stopped and metoprolol increased to 100 qd.\par He denies chest pain, shortness of breath, and palpitations.\par \par 07/11/2022 - Office visit:\par Some SOB recently. When getting into car, small distances,  lying down on back.  \par 1 month ago, significant SOB after walking and getting in car.\par SOB worse with mask.\par Pulse ox usually 93-94%.\par No CP or palpitations.\par To start PT.\par CXR  6/17 - atelectasis.\par Muscle aches.\par \par 08/15/2022 - Office visit:\par He is now on amlodipine (2.5 daily).\par Subsequently, at other physician offices, he has had the following blood pressures recorded: 120/80, 145/87, 136/90.  \par He continues to experience shortness of breath, when lying on his back after walking or when getting into his car. It is unchanged compared to what he noted a month ago.\par He was seen by pulmonary for this.\par He denies chest pain and palpitations.  \par He has multiple muscle pains and muscle cramps.

## 2022-08-16 PROBLEM — D75.81 SECONDARY MYELOFIBROSIS: Status: ACTIVE | Noted: 2019-08-06

## 2022-08-16 NOTE — HISTORY OF PRESENT ILLNESS
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 Complains of low back pain still-taking Flexeril twice a day.  No leg swelling. Wears compression socks. 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 Appetite good. Weight is stable. He notes no fever, night sweats, chest pain, INGRAM, headaches, visual problems, constipation, BRBPR, melena. Had re-vaccination and Shingrix x 1. Remains with dyspnea-has had this for about 2 months-at times feels like he can't catch his breath-will see Dr Qureshi (pulmonary) tomorrow;workup for this in progress. \par \par

## 2022-08-16 NOTE — CONSULT LETTER
[Dear  ___] : Dear ~PEDRO, [Courtesy Letter:] : I had the pleasure of seeing your patient, [unfilled], in my office today. [Please see my note below.] : Please see my note below. [Sincerely,] : Sincerely, [DrGuille  ___] : Dr. MONTES [FreeTextEntry2] : Patrick Gonzales MD [FreeTextEntry3] : Nilo\par Noam Coker M.D., FACP\par Professor of Medicine\par [employer]\par Associate Chief, Division of Hematology\par [facility]\par [facility]\par [address]\[place] [address]\par [phone]\par \par \par \par

## 2022-08-16 NOTE — REVIEW OF SYSTEMS
[Shortness Of Breath] : shortness of breath [Joint Pain] : joint pain [Negative] : Constitutional [Recent Change In Weight] : ~T no recent weight change [FreeTextEntry4] : Oral GVHD  [FreeTextEntry6] : feels INGRAM last two months

## 2022-08-16 NOTE — ASSESSMENT
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 Cytogenetics demonstrated the presence of del 13q and NGS revealed mutations in MPL and TET2. Marrow showed clonal evolution and a new MBUS population. The latter is not clinically significant. \par \par Weight loss, worsening anemia, increased blasts in peripheral blood and marrow, increased WBC indicated progression to more aggressive disease that warranted treatment. DIPSS score-6, high risk category. Was treated with Dacogen and Jakafi. Responded well with symptomatic improvement and resolution of splenomegaly. He then underwent MUD/ABO mismatch allogeneic stem cell transplantation followed by DLI. Doing well. Eosinophilia improved. Has restarted Jakafi. Tolerating it well. Hgb stable. Overall much improved. Given this, treatment with Retacrit has been discontinued.  Will receive IVIG monthly.  Being followed by [facility] transplant team.   Has had INGRAM last few months, to see pulmonary-Dr Qureshi.  Will follow up on results.       \par \par Plan:\par Acyclovir/atovaquone\par IVGG 25 g IV monthly-will complete in September.\par Jakafi 5 mg BID\par Decadron oral rinse\par [facility] f/u\par See pulmonary-Dr Qureshi\par RTC 1 month

## 2022-08-17 PROBLEM — J98.11 ATELECTASIS: Status: ACTIVE | Noted: 2022-01-01

## 2022-08-17 NOTE — HISTORY OF PRESENT ILLNESS
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 Noam Coker. \par Also follows with [facility] transplant, Dr. Vivian Altamirano\par Presents with approx 2 months of dyspnea on exertion- first felt significant symptoms after an IVIG infusion when he walked back to his car.\par Has been monitoring his pulse oximetry for some time and noted his pulse ox has slowly been reducing to the low 90s. +chronic cough, however, unproductive. Also with orthopnea.\par No antibiotics (other than Bactrim ppx) or steroids. \par \par Chest imaging:\par CTPA CHEST 7/26/2022\par INTERPRETATION: INDICATION: Shortness of breath, assess for pulmonary embolism and air trapping\par TECHNIQUE: Inspiratory and expiratory images of the chest were obtained. Subsequently, and angiogram was performed following the intravenous injection of 50 mL of Omnipaque 350. Maximum intensity projection images were generated.\par COMPARISON: None.\par FINDINGS:\par PULMONARY ANGIOGRAM: No pulmonary embolism.\par LUNGS/AIRWAYS/PLEURA: No endobronchial lesion. Low lung volumes with passive atelectasis in the lower lobes, middle lobe, and lingula. Mild bilateral air trapping with exhalation. Unremarkable pleura.\par LYMPH NODES/MEDIASTINUM: No enlarged lymph nodes.\par HEART/VASCULATURE: Normal heart size. Unremarkable pericardium. Subjectively mild amount of coronary calcified plaque. No aortic aneurysm.\par UPPER ABDOMEN: Cholelithiasis. Diverticulosis.\par BONES/SOFT TISSUES: Degenerative changes of the spine.\par \par IMPRESSION:\par No pulmonary embolism.\par Mild air trapping.\par \par PFT: \par 8/1/2022: moderate restriction, mildly reduced DLCO\par 3/14/22: normal spirometry, lung volumes and DLCO\par 11/6/20: normal spirometry, lung volumes and DLCO. \par \par Cardiac:\par Cath in 2020- normal coronaries, no stenting\par Echo: 11/2020: Normal global LV systolic function. Basal-mid inferior wall possibly hypokkinetic. Moderate (stage II) diastolic dysfunction, mild pulmonary hypertension est RSP 46 mmg\par \par Quality metrics:\par Tobacco use: never\par ESS: NA\par \par Vaccines: \par COVID:  Pfizer x3 and Evushield March 2022\par Influenza: received in fall 2021\par Pneumococcal: PCV in 2016\par

## 2022-08-17 NOTE — ASSESSMENT
[FreeTextEntry1] : A/P: 73 yo M who is s/p SCT 3/2021, on monthly IVIG who presents with 2 months of progressive INGRAM and new findings on CT chest, new moderate restriction on PFTs.\par \par I ambulated the patient approx 80 ft and his SpO2 remained>95% and he was asymptomatic. His exam is otherwise within normal limits.\par Reviewed the CT chest with the patient -  findings are c/w bibasilar atelectasis, however there are also some more peripheral lesions on the left. There do not appear to be endobronchial lesions. These findings are concerning for possible infection vs. idiopathic pulmonary syndrome (though he is approx 17 months since his SCT) vs. GVHD (though atypical appearing). \par I will review the CT images with Thoracic radiology and he will likely need a diagnostic bronchoscopy.\par

## 2022-08-17 NOTE — CONSULT LETTER
[Dear  ___] : Dear  [unfilled], [Consult Letter:] : I had the pleasure of evaluating your patient, [unfilled]. [Please see my note below.] : Please see my note below. [Consult Closing:] : Thank you very much for allowing me to participate in the care of this patient.  If you have any questions, please do not hesitate to contact me. [Sincerely,] : Sincerely, [DrGuille  ___] : Dr. MONTES [FreeTextEntry2] : Dr. Noam Coker  [FreeTextEntry3] : Autumn Qureshi MD, FAASM\par  of Medicine\par Associate , Fellowship in Pulmonary and Critical Care Medicine\par Division of Pulmonary, Critical Care & Sleep Medicine\par [employer] at [facility]\par \par \par

## 2022-09-29 NOTE — ASSESSMENT
[FreeTextEntry1] : A/P: 73 yo M who is s/p SCT 3/2021, on monthly IVIG who presents with 2 months of progressive INGRAM and new findings on CT chest, new moderate restriction on PFTs.\par Has been receiving Prednisone 40 mg BID and increased Jakafi dosing with minimal change in symptoms or CT chest findings. \par Concern for GVHD of the airways\par Will have patient set up for outpatient bronchoscopy with biopsies. \par \par

## 2022-09-29 NOTE — CONSULT LETTER
[Dear  ___] : Dear  [unfilled], [Please see my note below.] : Please see my note below. [Consult Closing:] : Thank you very much for allowing me to participate in the care of this patient.  If you have any questions, please do not hesitate to contact me. [Sincerely,] : Sincerely, [DrGuille  ___] : Dr. MONTES [Courtesy Letter:] : I had the pleasure of seeing your patient, [unfilled], in my office today. [FreeTextEntry2] : Dr. Noam Coker  [FreeTextEntry3] : Autumn Qureshi MD, FAASM\par  of Medicine\par Associate , Fellowship in Pulmonary and Critical Care Medicine\par Division of Pulmonary, Critical Care & Sleep Medicine\par [employer] at [facility]\par \par \par

## 2022-09-29 NOTE — HISTORY OF PRESENT ILLNESS
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 Noam Coker. \par Also follows with [facility] transplant, Dr. Vivian Altamirano\par Presents with approx 2 months of dyspnea on exertion- first felt significant symptoms after an IVIG infusion when he walked back to his car.\par Has been monitoring his pulse oximetry for some time and noted his pulse ox has slowly been reducing to the low 90s. +chronic cough, however, unproductive. Also with orthopnea.\par No antibiotics (other than Bactrim ppx) or steroids. \par \par Chest imaging:\par CTPA CHEST 7/26/2022\par INTERPRETATION: INDICATION: Shortness of breath, assess for pulmonary embolism and air trapping\par TECHNIQUE: Inspiratory and expiratory images of the chest were obtained. Subsequently, and angiogram was performed following the intravenous injection of 50 mL of Omnipaque 350. Maximum intensity projection images were generated.\par COMPARISON: None.\par FINDINGS:\par PULMONARY ANGIOGRAM: No pulmonary embolism.\par LUNGS/AIRWAYS/PLEURA: No endobronchial lesion. Low lung volumes with passive atelectasis in the lower lobes, middle lobe, and lingula. Mild bilateral air trapping with exhalation. Unremarkable pleura.\par LYMPH NODES/MEDIASTINUM: No enlarged lymph nodes.\par HEART/VASCULATURE: Normal heart size. Unremarkable pericardium. Subjectively mild amount of coronary calcified plaque. No aortic aneurysm.\par UPPER ABDOMEN: Cholelithiasis. Diverticulosis.\par BONES/SOFT TISSUES: Degenerative changes of the spine.\par \par IMPRESSION:\par No pulmonary embolism.\par Mild air trapping.\par \par PFT: \par 8/1/2022: moderate restriction, mildly reduced DLCO\par 3/14/22: normal spirometry, lung volumes and DLCO\par 11/6/20: normal spirometry, lung volumes and DLCO. \par \par Cardiac:\par Cath in 2020- normal coronaries, no stenting\par Echo: 11/2020: Normal global LV systolic function. Basal-mid inferior wall possibly hypokkinetic. Moderate (stage II) diastolic dysfunction, mild pulmonary hypertension est RSP 46 mmg\par \par Quality metrics:\par Tobacco use: never\par ESS: NA\par \par Vaccines: \par COVID:  Pfizer x3 and Evushield March 2022\par Influenza: received in fall 2021\par Pneumococcal: PCV in 2016\par \par Follow up OV 9/22/22:\par Feeling somewhat better - remains on Prednisone 40 mg BID\par Continues to have dyspnea on exertion, now with cough with mild sputum production \par Repeat CT chest 9/19/22 - minimal interval change of bilateral mid to lower lung areas of atelectasis. Slight irregularity of the central airways. \par

## 2022-10-06 NOTE — CHART NOTE - NSCHARTNOTEFT_GEN_A_CORE
CC: Monoclonal Antibody Infusion/COVID 19 Positive on 10/04/22  72y Male with  recent dx of COVID 19+ who presents today for elective Bebtelovimab. Patient has been screened and was deemed to be a candidate.    Symptoms/ Criteria  Date of Symptom Onset: 10/04/22  Symptoms: SOB cough congestion malaise  Date of Positive COVID PCR: 10/04/22  Risk Profile includes:   Myeloproliferative disease, Age > 65, HTN Bone marrow transplant    Vaccination Status: Pfizer and 1 Booster     PMHx:  Infection due to severe acute respiratory syndrome coronavirus 2 (SARS-CoV-2)    Rectal bleed    Hypertension    GERD (gastroesophageal reflux disease)    Cholesterol serum increased    Anemia    Spinal stenosis    Herniated disc    Hearing loss    Pain Disorder    Back pain    Myeloproliferative disease    Acoustic nerve disease    Spinal fluid abnorm    Knee gives way    Knee gives way    Vasectomy status    Esophagus disorder    S/P lumbar discectomy    History of vasectomy    Rectal mass        Exam/findings:  T(C): 36.3 (10-06-22 @ 09:25), Max: 36.3 (10-06-22 @ 09:25)  HR: 85 (10-06-22 @ 09:25) (85 - 85)  BP: 150/89 (10-06-22 @ 09:25) (150/89 - 150/89)  RR: 18 (10-06-22 @ 09:25) (18 - 18)  SpO2: 94% (10-06-22 @ 09:25) (94% - 94%)    PE:   Appearance: NAD	  HEENT:  NC/AT  Cardiovascular:  No edema  Respiratory: no use of accessory muscles  Gastrointestinal:  non-distended   Skin: warm and dry  Neurologic: Non-focal  Extremities: Normal range of motion    ASSESSMENT:  Pt is COVID positive with mild to moderate symptoms who was referred for elective MAB (Bebtelovimab).    PLAN:  - MAB treatment explained to patient. I have reviewed the Bebtelovimab Emergency Use Authorization (EUA) and I have provided the patient or patient's caregiver with the following information:   1. FDA has authorized emergency use of Bebtelovimab to be administered for the treatment of mild to moderate COVID-19, which is not an FDA-approved biological product.   2. The patient or patient's caregiver has the option to accept or refuse administration of MAB.   3. The significant known and potential risks and benefits of Bebtelovimab and the extent to which such risks and benefits are unknown.  4. Information on available alternative treatments and risks and benefits of those alternatives.  - Patient verbalized understanding of plan and agrees to treatment. All questions answered.  - Consent for MAB obtained.   - 175mg of Bebtelovimab administered as a single intravenous injection over at least 30 seconds.   - Observe patient for one hour post medication administration and then if stable, discharge home with oupatient follow up as planned by PCP.      POST ASSESSMENT:   Patient completed MAB, and monitored x 1 hour post-infusion with no adverse reactions noted, remained hemodynamically stable.  - Patient tolerated infusion well; denies complaints of chest pain/SOB/dizziness/palpitations.   - VSS for discharge home.  - D/C instructions given/ fact sheet included.  - Patient was instructed to self-isolate and use infection control measures (e.g wear mask, isolate, social distance, avoid sharing personal items, clean and disinfect "high touch" surfaces, and frequent handwashing according to the CDC guidelines.   - The patient was informed on what symptoms to be aware of for the next couple of days, and if there are any issues to call the 24/7 clinical call center. Patient was instructed to follow up with primary care provider as needed.  -Discharge 1 hour post-injection

## 2022-11-22 PROBLEM — R73.9 HYPERGLYCEMIA: Status: ACTIVE | Noted: 2022-01-01

## 2022-11-25 PROBLEM — R79.89 ELEVATED LFTS: Status: ACTIVE | Noted: 2019-12-03

## 2022-11-25 PROBLEM — R06.00 DYSPNEA: Status: ACTIVE | Noted: 2022-01-01

## 2022-11-25 PROBLEM — D89.813 GRAFT VS HOST DISEASE: Status: ACTIVE | Noted: 2022-01-01

## 2022-11-25 PROBLEM — R73.09 ELEVATED GLUCOSE: Status: ACTIVE | Noted: 2018-03-05

## 2022-11-25 NOTE — REVIEW OF SYSTEMS
[SOB on Exertion] : shortness of breath during exertion [Skin Wound] : skin wound [Negative] : Heme/Lymph [FreeTextEntry7] : Histortically alternating constipation and diarrhea seen by GI. No acute symptoms at this time. [de-identified] : B/L foot wounds Likely secondary to GVHD.

## 2022-11-25 NOTE — END OF VISIT
[] : Fellow [FreeTextEntry3] : 72 year old man w/ incredibly complex PMH presents for initial visit. Patient referred by oncologist, Dr. Altamirano, at [facility]. I spoke with her prior to seeing the patient. Her largest concern is steroid-induced hyperglycemia. He recently started insulin w/ lantus 23 U qhs and humalog 10 U TID ~1 week ago with some improvement in F/S. Prednisone dose remains 20 mg BID and has been adjusted in the past month. We did not make insulin adjustment today, but suspect he will need slightly higher doses if steroid dose does not change.\par \par He was recently hospitalized at [facility] for LE wounds. We do not have available records for review. He is following w/ wound care and notably has been using compressive dressings. Currently w/o infection; reportedly attributable to GVHD.\par \par He also has had recent hypoxic respiratory failure. Currently does not require supplemental O2. Unclear exact etiology. He had prior bronchoscopy that was unrevealing. He is pending further PFTs. I also briefly discussed his case w/ his pulmonologist, Dr. Qureshi.\par \par He reportedly has HCP at home (his wife appointed). Asked himn to send a copy for us to scan to records.\par \par He is working w/ PT. He does remain relatively independent in all ADLs and most IADLs, currently using rollator for gait imbalance.\par \par Requested records from variety of specialists from outside the [facility] system. We rec close f/u within 2-4 weeks to monitor his F/S and potential need for insulin adjustment.

## 2022-11-25 NOTE — HISTORY OF PRESENT ILLNESS
[Completely Independent] : Completely independent. [Walker] : walker [0] : 2) Feeling down, depressed, or hopeless: Not at all (0) [PHQ-2 Negative - No further assessment needed] : PHQ-2 Negative - No further assessment needed [One fall no injury in past year] : Patient reported one fall in the past year without injury [de-identified] : mechanical fall  [FreeTextEntry1] : Some assistance needed due to foot pathology. [de-identified] : wife prepares meals historically [[id number]] : 0

## 2022-11-25 NOTE — PHYSICAL EXAM
[Alert] : alert [No Acute Distress] : in no acute distress [Normal Outer Ear/Nose] : the ears and nose were normal in appearance [Normal Appearance] : the appearance of the neck was normal [Supple] : the neck was supple [No Respiratory Distress] : no respiratory distress [No Acc Muscle Use] : no accessory muscle use [Respiration, Rhythm And Depth] : normal respiratory rhythm and effort [Auscultation Breath Sounds / Voice Sounds] : lungs were clear to auscultation bilaterally [Normal S1, S2] : normal S1 and S2 [Heart Rate And Rhythm] : heart rate was normal and rhythm regular [Bowel Sounds] : normal bowel sounds [Abdomen Tenderness] : non-tender [Abdomen Soft] : soft [No Spinal Tenderness] : no spinal tenderness [No Focal Deficits] : no focal deficits [Oriented To Time, Place, And Person] : oriented to person, place, and time [Normal Affect] : the affect was normal [Normal Insight/Judgment] : insight and judgment were intact [Normal Mood] : the mood was normal [Normal Gait] : abnormal gait [de-identified] : well healing cut above forehead centrally from mechanical fall  [de-identified] : mild decreased breath sounds B/L lower lung fields. [de-identified] : gait affected by B/L LE wounds. [de-identified] : B/L LE wounds. Serosanguineous discharge. Not infected at this time. Dressing by wife.

## 2022-11-25 NOTE — REASON FOR VISIT
[Initial Evaluation] : an initial evaluation [Spouse] : spouse [FreeTextEntry3] : Miss Dayne Yoon ([phone])

## 2022-12-05 PROBLEM — R73.9 STEROID-INDUCED HYPERGLYCEMIA: Status: ACTIVE | Noted: 2022-01-01

## 2023-03-09 NOTE — HISTORY OF PRESENT ILLNESS
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 Low back pain has resolved. One day recently he had emesis and diarrhea. He notes this may have been due to shrimp he ate. He reports ROM in his hands is improving. Tremors have resolved. He notes no fever, night sweats, chest pain, SOB, headaches, visual problems, BRBPR, melena. Will begin IVGG today. Length To Time In Minutes Device Was In Place: 10

## 2023-08-14 NOTE — ED ADULT TRIAGE NOTE - SPO2 (%)
96 Olumiant Pregnancy And Lactation Text: Based on animal studies, Olumiant may cause embryo-fetal harm when administered to pregnant women.  The medication should not be used in pregnancy.  Breastfeeding is not recommended during treatment.

## 2024-06-13 NOTE — PACU DISCHARGE NOTE - NS MD DISCHARGE NOTE DISCHARGE
ASSESSMENT & PLAN:  Diagnoses and all orders for this visit:  Elevated blood pressure reading without diagnosis of hypertension    Blood pressure normal at home  No meds needed    Return in about 1 year (around 6/13/2025) for Medicare Annual Wellness.    -----------------------------------    Chief Complaint   Patient presents with    Office Visit     1 month f/u on blood pressure     HTN - No CP, SOB or LE edema    Home BP 130s/60-70s    Vitals:    06/13/24 1433 06/13/24 1451   BP: (!) 142/71 135/75  Comment: bp reported at home   Pulse: 76    Resp: 16    Temp: 98 °F (36.7 °C)    TempSrc: Temporal    SpO2: 97%    Weight: 59.1 kg (130 lb 4.7 oz)    Height: 5' 6\" (1.676 m)      Physical Exam  General: NAD  Cardiovascular: RRR. No m/r/g.   Respiratory: CTAB. Normal WOB.     Home

## 2024-11-14 NOTE — RESULTS/DATA
[FreeTextEntry1] : WBC 29,000 Hgb 11.1 Hct 35.1 Platelets 257,000 Diff pending\par \par 5/19/20\par BM asp/bx: MPN with severe myelofibrosis, monocytosis. 3% blasts. Small MBUS population.\par 
Ears: no ear pain and no hearing problems. Nose: no nasal congestion and no nasal drainage. Mouth/Throat: no dysphagia, no hoarseness and no throat pain. Neck: no lumps, no pain, no stiffness and no swollen glands.